# Patient Record
Sex: MALE | Race: WHITE | NOT HISPANIC OR LATINO | Employment: FULL TIME | URBAN - NONMETROPOLITAN AREA
[De-identification: names, ages, dates, MRNs, and addresses within clinical notes are randomized per-mention and may not be internally consistent; named-entity substitution may affect disease eponyms.]

---

## 2017-05-19 ENCOUNTER — OFFICE VISIT (OUTPATIENT)
Dept: OTOLARYNGOLOGY | Facility: CLINIC | Age: 47
End: 2017-05-19

## 2017-05-19 VITALS
SYSTOLIC BLOOD PRESSURE: 124 MMHG | BODY MASS INDEX: 34.58 KG/M2 | WEIGHT: 284 LBS | HEIGHT: 76 IN | RESPIRATION RATE: 18 BRPM | HEART RATE: 73 BPM | TEMPERATURE: 97.3 F | DIASTOLIC BLOOD PRESSURE: 89 MMHG

## 2017-05-19 DIAGNOSIS — J01.40 ACUTE PANSINUSITIS, RECURRENCE NOT SPECIFIED: Primary | ICD-10-CM

## 2017-05-19 DIAGNOSIS — E89.0 POSTOPERATIVE HYPOTHYROIDISM: ICD-10-CM

## 2017-05-19 PROBLEM — E03.9 HYPOTHYROIDISM: Status: ACTIVE | Noted: 2017-05-19

## 2017-05-19 PROBLEM — E04.9 GOITER: Status: RESOLVED | Noted: 2017-05-19 | Resolved: 2017-05-19

## 2017-05-19 PROBLEM — E04.9 GOITER: Status: ACTIVE | Noted: 2017-05-19

## 2017-05-19 PROCEDURE — 99214 OFFICE O/P EST MOD 30 MIN: CPT | Performed by: PHYSICIAN ASSISTANT

## 2017-05-19 RX ORDER — LEVOTHYROXINE SODIUM 0.1 MG/1
TABLET ORAL DAILY
COMMUNITY
End: 2017-06-07 | Stop reason: SDUPTHER

## 2017-05-19 RX ORDER — METHYLPREDNISOLONE 4 MG/1
TABLET ORAL
Qty: 1 EACH | Refills: 0 | Status: SHIPPED | OUTPATIENT
Start: 2017-05-19 | End: 2018-03-10 | Stop reason: HOSPADM

## 2017-05-19 RX ORDER — HYDROCODONE BITARTRATE AND ACETAMINOPHEN 7.5; 325 MG/1; MG/1
TABLET ORAL EVERY 6 HOURS
COMMUNITY

## 2017-05-19 RX ORDER — AMOXICILLIN AND CLAVULANATE POTASSIUM 875; 125 MG/1; MG/1
1 TABLET, FILM COATED ORAL 2 TIMES DAILY
Qty: 20 TABLET | Refills: 0 | Status: SHIPPED | OUTPATIENT
Start: 2017-05-19 | End: 2017-05-29

## 2017-05-19 RX ORDER — GABAPENTIN 300 MG/1
CAPSULE ORAL 3 TIMES DAILY
COMMUNITY
End: 2021-09-13

## 2017-05-19 RX ORDER — PANTOPRAZOLE SODIUM 40 MG/1
GRANULE, DELAYED RELEASE ORAL
COMMUNITY
End: 2021-09-13

## 2017-06-07 DIAGNOSIS — E89.0 POSTOPERATIVE HYPOTHYROIDISM: ICD-10-CM

## 2017-06-07 RX ORDER — LEVOTHYROXINE SODIUM 0.1 MG/1
TABLET ORAL
Qty: 30 TABLET | Refills: 9 | Status: SHIPPED | OUTPATIENT
Start: 2017-06-07 | End: 2018-06-11 | Stop reason: SDUPTHER

## 2017-07-06 DIAGNOSIS — E89.0 POSTOPERATIVE HYPOTHYROIDISM: ICD-10-CM

## 2017-07-06 DIAGNOSIS — E89.0 POSTOPERATIVE HYPOTHYROIDISM: Primary | ICD-10-CM

## 2017-07-06 LAB
ALBUMIN SERPL-MCNC: 4.1 G/DL (ref 3.5–5.2)
ALP BLD-CCNC: 76 U/L (ref 40–130)
ALT SERPL-CCNC: 35 U/L (ref 5–41)
ANION GAP SERPL CALCULATED.3IONS-SCNC: 15 MMOL/L (ref 7–19)
AST SERPL-CCNC: 24 U/L (ref 5–40)
BILIRUB SERPL-MCNC: <0.2 MG/DL (ref 0.2–1.2)
BUN BLDV-MCNC: 22 MG/DL (ref 6–20)
CALCIUM SERPL-MCNC: 9.1 MG/DL (ref 8.6–10)
CHLORIDE BLD-SCNC: 101 MMOL/L (ref 98–111)
CO2: 25 MMOL/L (ref 22–29)
CREAT SERPL-MCNC: 0.9 MG/DL (ref 0.5–1.2)
GFR NON-AFRICAN AMERICAN: >60
GLUCOSE BLD-MCNC: 121 MG/DL (ref 74–109)
HBA1C MFR BLD: 6.8 %
LDL CHOLESTEROL DIRECT: 128 MG/DL
POTASSIUM SERPL-SCNC: 4.4 MMOL/L (ref 3.5–5)
SODIUM BLD-SCNC: 141 MMOL/L (ref 136–145)
TOTAL PROTEIN: 7.2 G/DL (ref 6.6–8.7)
TRIGL SERPL-MCNC: 523 MG/DL (ref 150–199)
TSH SERPL DL<=0.05 MIU/L-ACNC: 3.12 UIU/ML (ref 0.27–4.2)

## 2017-07-12 RX ORDER — TRAMADOL HYDROCHLORIDE 50 MG/1
50 TABLET ORAL EVERY 4 HOURS PRN
COMMUNITY
End: 2017-07-13

## 2017-07-13 ENCOUNTER — OFFICE VISIT (OUTPATIENT)
Dept: INTERNAL MEDICINE | Age: 47
End: 2017-07-13
Payer: COMMERCIAL

## 2017-07-13 VITALS
HEART RATE: 83 BPM | WEIGHT: 289 LBS | HEIGHT: 76 IN | SYSTOLIC BLOOD PRESSURE: 110 MMHG | DIASTOLIC BLOOD PRESSURE: 78 MMHG | BODY MASS INDEX: 35.19 KG/M2 | OXYGEN SATURATION: 98 %

## 2017-07-13 DIAGNOSIS — E78.2 MIXED HYPERLIPIDEMIA: ICD-10-CM

## 2017-07-13 DIAGNOSIS — R73.03 PREDIABETES: ICD-10-CM

## 2017-07-13 DIAGNOSIS — E89.0 POSTOPERATIVE HYPOTHYROIDISM: Primary | ICD-10-CM

## 2017-07-13 PROCEDURE — 99214 OFFICE O/P EST MOD 30 MIN: CPT | Performed by: INTERNAL MEDICINE

## 2017-07-13 ASSESSMENT — ENCOUNTER SYMPTOMS
RHINORRHEA: 0
SORE THROAT: 0
TROUBLE SWALLOWING: 0
COUGH: 0
NAUSEA: 0
ABDOMINAL PAIN: 0
SHORTNESS OF BREATH: 0

## 2017-07-13 ASSESSMENT — PATIENT HEALTH QUESTIONNAIRE - PHQ9
2. FEELING DOWN, DEPRESSED OR HOPELESS: 0
SUM OF ALL RESPONSES TO PHQ QUESTIONS 1-9: 0
1. LITTLE INTEREST OR PLEASURE IN DOING THINGS: 0
SUM OF ALL RESPONSES TO PHQ9 QUESTIONS 1 & 2: 0

## 2018-02-05 ENCOUNTER — TELEPHONE (OUTPATIENT)
Dept: INTERNAL MEDICINE | Age: 48
End: 2018-02-05

## 2018-02-20 ENCOUNTER — OFFICE VISIT (OUTPATIENT)
Dept: INTERNAL MEDICINE | Age: 48
End: 2018-02-20
Payer: COMMERCIAL

## 2018-02-20 VITALS
HEART RATE: 80 BPM | OXYGEN SATURATION: 98 % | DIASTOLIC BLOOD PRESSURE: 60 MMHG | TEMPERATURE: 98 F | SYSTOLIC BLOOD PRESSURE: 104 MMHG

## 2018-02-20 DIAGNOSIS — R50.9 FEVER, UNSPECIFIED FEVER CAUSE: Primary | ICD-10-CM

## 2018-02-20 PROBLEM — J01.40 ACUTE PANSINUSITIS: Status: ACTIVE | Noted: 2017-05-19

## 2018-02-20 LAB
INFLUENZA A ANTIBODY: POSITIVE
INFLUENZA B ANTIBODY: NEGATIVE

## 2018-02-20 PROCEDURE — 99213 OFFICE O/P EST LOW 20 MIN: CPT | Performed by: NURSE PRACTITIONER

## 2018-02-20 PROCEDURE — 87804 INFLUENZA ASSAY W/OPTIC: CPT | Performed by: NURSE PRACTITIONER

## 2018-02-20 RX ORDER — OSELTAMIVIR PHOSPHATE 75 MG/1
75 CAPSULE ORAL 2 TIMES DAILY
Qty: 14 CAPSULE | Refills: 0 | Status: SHIPPED | OUTPATIENT
Start: 2018-02-20 | End: 2018-02-21 | Stop reason: SDUPTHER

## 2018-02-20 ASSESSMENT — ENCOUNTER SYMPTOMS
ABDOMINAL DISTENTION: 0
WHEEZING: 0
TROUBLE SWALLOWING: 0
NAUSEA: 0
STRIDOR: 0
EYE DISCHARGE: 0
COLOR CHANGE: 0
DIARRHEA: 0
CHOKING: 0
BLOOD IN STOOL: 0
SHORTNESS OF BREATH: 0
COUGH: 1
ABDOMINAL PAIN: 0
SORE THROAT: 1
EYE ITCHING: 0
VOMITING: 0
CONSTIPATION: 0

## 2018-02-20 NOTE — PROGRESS NOTES
dizziness, tremors, syncope, speech difficulty, weakness and headaches. Hematological: Negative for adenopathy. Does not bruise/bleed easily. Psychiatric/Behavioral: Negative for confusion and hallucinations. Objective:     Physical Exam   Constitutional: He is oriented to person, place, and time. He appears well-developed and well-nourished. No distress. HENT:   Head: Normocephalic and atraumatic. Eyes: Pupils are equal, round, and reactive to light. Right eye exhibits no discharge. Left eye exhibits no discharge. No scleral icterus. Neck: Normal range of motion. Neck supple. No JVD present. No thyromegaly present. Cardiovascular: Normal rate, regular rhythm and normal heart sounds. No murmur heard. Pulmonary/Chest: Effort normal and breath sounds normal. No respiratory distress. He has no wheezes. He has no rales. Abdominal: Soft. Bowel sounds are normal. He exhibits no distension and no mass. There is no tenderness. There is no rebound and no guarding. Musculoskeletal: Normal range of motion. He exhibits no edema or tenderness. Neurological: He is alert and oriented to person, place, and time. He has normal reflexes. No cranial nerve deficit. Coordination normal.   Skin: Skin is warm and dry. No rash noted. No erythema. Psychiatric: His behavior is normal. Judgment and thought content normal. He does not exhibit a depressed mood. /60   Pulse 80   Temp 98 °F (36.7 °C)   SpO2 98%     Assessment:      No diagnosis found. Plan:        Patient given educational materials - see patient instructions. Discussed use, benefit, and side effects of prescribed medications. All patient questions answered. Pt voiced understanding. Reviewed health maintenance. Instructed to continue current medications, diet and exercise. Patient agreed with treatment plan. Follow up as directed. MEDICATIONS:  No orders of the defined types were placed in this encounter.         ORDERS:  No

## 2018-02-21 RX ORDER — OSELTAMIVIR PHOSPHATE 75 MG/1
75 CAPSULE ORAL 2 TIMES DAILY
Qty: 14 CAPSULE | Refills: 0 | Status: SHIPPED | OUTPATIENT
Start: 2018-02-21 | End: 2018-02-28

## 2018-02-22 ENCOUNTER — TELEPHONE (OUTPATIENT)
Dept: INTERNAL MEDICINE | Age: 48
End: 2018-02-22

## 2018-02-22 NOTE — TELEPHONE ENCOUNTER
Advised to go to the ED, pt states he is loosing his hearing, almost cannot see out of his eyes, eyes are getting mucousy looking, he said his head feels like it is going to pop.  There is so much pressure DISPLAY PLAN FREE TEXT

## 2018-02-23 ENCOUNTER — TELEPHONE (OUTPATIENT)
Dept: INTERNAL MEDICINE | Age: 48
End: 2018-02-23

## 2018-02-23 RX ORDER — CEFDINIR 300 MG/1
300 CAPSULE ORAL 2 TIMES DAILY
Qty: 14 CAPSULE | Refills: 0 | Status: SHIPPED | OUTPATIENT
Start: 2018-02-23 | End: 2018-02-23 | Stop reason: SDUPTHER

## 2018-02-23 RX ORDER — CEFDINIR 300 MG/1
300 CAPSULE ORAL 2 TIMES DAILY
Qty: 14 CAPSULE | Refills: 0 | Status: SHIPPED | OUTPATIENT
Start: 2018-02-23 | End: 2018-03-02 | Stop reason: SDUPTHER

## 2018-03-02 ENCOUNTER — TELEPHONE (OUTPATIENT)
Dept: INTERNAL MEDICINE | Age: 48
End: 2018-03-02

## 2018-03-02 RX ORDER — CEFDINIR 300 MG/1
300 CAPSULE ORAL 2 TIMES DAILY
Qty: 14 CAPSULE | Refills: 0 | Status: SHIPPED | OUTPATIENT
Start: 2018-03-02 | End: 2018-03-09

## 2018-03-02 NOTE — TELEPHONE ENCOUNTER
He can either come over for me to look at it again or we can renew his script, whichever he would like

## 2018-03-09 ENCOUNTER — APPOINTMENT (OUTPATIENT)
Dept: CT IMAGING | Facility: HOSPITAL | Age: 48
End: 2018-03-09

## 2018-03-09 ENCOUNTER — HOSPITAL ENCOUNTER (OUTPATIENT)
Facility: HOSPITAL | Age: 48
Setting detail: OBSERVATION
Discharge: HOME | End: 2018-03-10
Attending: FAMILY MEDICINE | Admitting: FAMILY MEDICINE

## 2018-03-09 ENCOUNTER — APPOINTMENT (OUTPATIENT)
Dept: GENERAL RADIOLOGY | Facility: HOSPITAL | Age: 48
End: 2018-03-09

## 2018-03-09 DIAGNOSIS — K57.32 DIVERTICULITIS OF COLON WITHOUT HEMORRHAGE: Primary | ICD-10-CM

## 2018-03-09 LAB
ALBUMIN SERPL-MCNC: 4.1 G/DL (ref 3.5–5)
ALBUMIN/GLOB SERPL: 1.1 G/DL (ref 1.1–2.5)
ALP SERPL-CCNC: 80 U/L (ref 24–120)
ALT SERPL W P-5'-P-CCNC: 58 U/L (ref 0–54)
ANION GAP SERPL CALCULATED.3IONS-SCNC: 11 MMOL/L (ref 4–13)
AST SERPL-CCNC: 40 U/L (ref 7–45)
BASOPHILS # BLD AUTO: 0.05 10*3/MM3 (ref 0–0.2)
BASOPHILS NFR BLD AUTO: 0.4 % (ref 0–2)
BILIRUB SERPL-MCNC: 0.6 MG/DL (ref 0.1–1)
BILIRUB UR QL STRIP: NEGATIVE
BUN BLD-MCNC: 21 MG/DL (ref 5–21)
BUN/CREAT SERPL: 22.8 (ref 7–25)
CALCIUM SPEC-SCNC: 9.4 MG/DL (ref 8.4–10.4)
CHLORIDE SERPL-SCNC: 100 MMOL/L (ref 98–110)
CLARITY UR: CLEAR
CO2 SERPL-SCNC: 31 MMOL/L (ref 24–31)
COLOR UR: ABNORMAL
CREAT BLD-MCNC: 0.92 MG/DL (ref 0.5–1.4)
D-LACTATE SERPL-SCNC: 1.2 MMOL/L (ref 0.5–2)
DEPRECATED RDW RBC AUTO: 39.8 FL (ref 40–54)
EOSINOPHIL # BLD AUTO: 0.22 10*3/MM3 (ref 0–0.7)
EOSINOPHIL NFR BLD AUTO: 1.9 % (ref 0–4)
ERYTHROCYTE [DISTWIDTH] IN BLOOD BY AUTOMATED COUNT: 13.5 % (ref 12–15)
GFR SERPL CREATININE-BSD FRML MDRD: 88 ML/MIN/1.73
GLOBULIN UR ELPH-MCNC: 3.8 GM/DL
GLUCOSE BLD-MCNC: 102 MG/DL (ref 70–100)
GLUCOSE UR STRIP-MCNC: NEGATIVE MG/DL
HCT VFR BLD AUTO: 45 % (ref 40–52)
HGB BLD-MCNC: 14.6 G/DL (ref 14–18)
HGB UR QL STRIP.AUTO: NEGATIVE
IMM GRANULOCYTES # BLD: 0.1 10*3/MM3 (ref 0–0.03)
IMM GRANULOCYTES NFR BLD: 0.9 % (ref 0–5)
KETONES UR QL STRIP: ABNORMAL
LEUKOCYTE ESTERASE UR QL STRIP.AUTO: NEGATIVE
LYMPHOCYTES # BLD AUTO: 2.98 10*3/MM3 (ref 0.72–4.86)
LYMPHOCYTES NFR BLD AUTO: 25.7 % (ref 15–45)
MCH RBC QN AUTO: 26.6 PG (ref 28–32)
MCHC RBC AUTO-ENTMCNC: 32.4 G/DL (ref 33–36)
MCV RBC AUTO: 82 FL (ref 82–95)
MONOCYTES # BLD AUTO: 0.82 10*3/MM3 (ref 0.19–1.3)
MONOCYTES NFR BLD AUTO: 7.1 % (ref 4–12)
NEUTROPHILS # BLD AUTO: 7.43 10*3/MM3 (ref 1.87–8.4)
NEUTROPHILS NFR BLD AUTO: 64 % (ref 39–78)
NITRITE UR QL STRIP: NEGATIVE
NRBC BLD MANUAL-RTO: 0 /100 WBC (ref 0–0)
PH UR STRIP.AUTO: <=5 [PH] (ref 5–8)
PLATELET # BLD AUTO: 318 10*3/MM3 (ref 130–400)
PMV BLD AUTO: 9.2 FL (ref 6–12)
POTASSIUM BLD-SCNC: 4.4 MMOL/L (ref 3.5–5.3)
PROT SERPL-MCNC: 7.9 G/DL (ref 6.3–8.7)
PROT UR QL STRIP: NEGATIVE
RBC # BLD AUTO: 5.49 10*6/MM3 (ref 4.8–5.9)
SODIUM BLD-SCNC: 142 MMOL/L (ref 135–145)
SP GR UR STRIP: 1.03 (ref 1–1.03)
UROBILINOGEN UR QL STRIP: ABNORMAL
WBC NRBC COR # BLD: 11.6 10*3/MM3 (ref 4.8–10.8)

## 2018-03-09 PROCEDURE — 25010000002 CIPROFLOXACIN PER 200 MG: Performed by: FAMILY MEDICINE

## 2018-03-09 PROCEDURE — 74177 CT ABD & PELVIS W/CONTRAST: CPT

## 2018-03-09 PROCEDURE — 96375 TX/PRO/DX INJ NEW DRUG ADDON: CPT

## 2018-03-09 PROCEDURE — 96376 TX/PRO/DX INJ SAME DRUG ADON: CPT

## 2018-03-09 PROCEDURE — 99284 EMERGENCY DEPT VISIT MOD MDM: CPT

## 2018-03-09 PROCEDURE — 25010000002 HYDROMORPHONE PER 4 MG: Performed by: EMERGENCY MEDICINE

## 2018-03-09 PROCEDURE — 25010000002 ONDANSETRON PER 1 MG: Performed by: PHYSICIAN ASSISTANT

## 2018-03-09 PROCEDURE — G0378 HOSPITAL OBSERVATION PER HR: HCPCS

## 2018-03-09 PROCEDURE — 0 IOPAMIDOL 61 % SOLUTION: Performed by: PHYSICIAN ASSISTANT

## 2018-03-09 PROCEDURE — 81003 URINALYSIS AUTO W/O SCOPE: CPT | Performed by: EMERGENCY MEDICINE

## 2018-03-09 PROCEDURE — 96366 THER/PROPH/DIAG IV INF ADDON: CPT

## 2018-03-09 PROCEDURE — 96361 HYDRATE IV INFUSION ADD-ON: CPT

## 2018-03-09 PROCEDURE — 80053 COMPREHEN METABOLIC PANEL: CPT | Performed by: EMERGENCY MEDICINE

## 2018-03-09 PROCEDURE — 85025 COMPLETE CBC W/AUTO DIFF WBC: CPT | Performed by: EMERGENCY MEDICINE

## 2018-03-09 PROCEDURE — 83605 ASSAY OF LACTIC ACID: CPT | Performed by: EMERGENCY MEDICINE

## 2018-03-09 PROCEDURE — 25010000002 HYDROMORPHONE PER 4 MG: Performed by: FAMILY MEDICINE

## 2018-03-09 PROCEDURE — 74019 RADEX ABDOMEN 2 VIEWS: CPT

## 2018-03-09 PROCEDURE — 96365 THER/PROPH/DIAG IV INF INIT: CPT

## 2018-03-09 PROCEDURE — 25010000002 ONDANSETRON PER 1 MG: Performed by: EMERGENCY MEDICINE

## 2018-03-09 RX ORDER — ACETAMINOPHEN 325 MG/1
650 TABLET ORAL EVERY 4 HOURS PRN
Status: DISCONTINUED | OUTPATIENT
Start: 2018-03-09 | End: 2018-03-10 | Stop reason: HOSPADM

## 2018-03-09 RX ORDER — NALOXONE HCL 0.4 MG/ML
0.4 VIAL (ML) INJECTION
Status: DISCONTINUED | OUTPATIENT
Start: 2018-03-09 | End: 2018-03-09

## 2018-03-09 RX ORDER — GABAPENTIN 300 MG/1
300 CAPSULE ORAL EVERY 8 HOURS SCHEDULED
Status: DISCONTINUED | OUTPATIENT
Start: 2018-03-09 | End: 2018-03-10 | Stop reason: HOSPADM

## 2018-03-09 RX ORDER — SODIUM CHLORIDE 0.9 % (FLUSH) 0.9 %
1-10 SYRINGE (ML) INJECTION AS NEEDED
Status: DISCONTINUED | OUTPATIENT
Start: 2018-03-09 | End: 2018-03-10 | Stop reason: HOSPADM

## 2018-03-09 RX ORDER — PANTOPRAZOLE SODIUM 40 MG/1
40 TABLET, DELAYED RELEASE ORAL
Status: DISCONTINUED | OUTPATIENT
Start: 2018-03-10 | End: 2018-03-10 | Stop reason: HOSPADM

## 2018-03-09 RX ORDER — KETOROLAC TROMETHAMINE 30 MG/ML
30 INJECTION, SOLUTION INTRAMUSCULAR; INTRAVENOUS EVERY 6 HOURS PRN
Status: DISCONTINUED | OUTPATIENT
Start: 2018-03-09 | End: 2018-03-10 | Stop reason: HOSPADM

## 2018-03-09 RX ORDER — ONDANSETRON 2 MG/ML
4 INJECTION INTRAMUSCULAR; INTRAVENOUS ONCE
Status: COMPLETED | OUTPATIENT
Start: 2018-03-09 | End: 2018-03-09

## 2018-03-09 RX ORDER — ONDANSETRON 2 MG/ML
4 INJECTION INTRAMUSCULAR; INTRAVENOUS EVERY 6 HOURS PRN
Status: DISCONTINUED | OUTPATIENT
Start: 2018-03-09 | End: 2018-03-10 | Stop reason: HOSPADM

## 2018-03-09 RX ORDER — LEVOTHYROXINE SODIUM 0.1 MG/1
100 TABLET ORAL
Status: DISCONTINUED | OUTPATIENT
Start: 2018-03-10 | End: 2018-03-10 | Stop reason: HOSPADM

## 2018-03-09 RX ORDER — MORPHINE SULFATE 2 MG/ML
1 INJECTION, SOLUTION INTRAMUSCULAR; INTRAVENOUS EVERY 4 HOURS PRN
Status: DISCONTINUED | OUTPATIENT
Start: 2018-03-09 | End: 2018-03-09

## 2018-03-09 RX ORDER — ALUMINA, MAGNESIA, AND SIMETHICONE 2400; 2400; 240 MG/30ML; MG/30ML; MG/30ML
15 SUSPENSION ORAL EVERY 6 HOURS PRN
Status: DISCONTINUED | OUTPATIENT
Start: 2018-03-09 | End: 2018-03-10 | Stop reason: HOSPADM

## 2018-03-09 RX ORDER — CIPROFLOXACIN 2 MG/ML
400 INJECTION, SOLUTION INTRAVENOUS ONCE
Status: COMPLETED | OUTPATIENT
Start: 2018-03-09 | End: 2018-03-09

## 2018-03-09 RX ORDER — SODIUM CHLORIDE 9 MG/ML
100 INJECTION, SOLUTION INTRAVENOUS CONTINUOUS
Status: DISCONTINUED | OUTPATIENT
Start: 2018-03-09 | End: 2018-03-10 | Stop reason: HOSPADM

## 2018-03-09 RX ORDER — MORPHINE SULFATE 2 MG/ML
2 INJECTION, SOLUTION INTRAMUSCULAR; INTRAVENOUS EVERY 4 HOURS PRN
Status: DISCONTINUED | OUTPATIENT
Start: 2018-03-09 | End: 2018-03-09

## 2018-03-09 RX ORDER — MEPERIDINE HYDROCHLORIDE 25 MG/ML
25 INJECTION INTRAMUSCULAR; INTRAVENOUS; SUBCUTANEOUS ONCE
Status: COMPLETED | OUTPATIENT
Start: 2018-03-09 | End: 2018-03-09

## 2018-03-09 RX ORDER — LEVOFLOXACIN 5 MG/ML
500 INJECTION, SOLUTION INTRAVENOUS EVERY 24 HOURS
Status: DISCONTINUED | OUTPATIENT
Start: 2018-03-10 | End: 2018-03-10 | Stop reason: HOSPADM

## 2018-03-09 RX ORDER — SODIUM CHLORIDE, SODIUM LACTATE, POTASSIUM CHLORIDE, CALCIUM CHLORIDE 600; 310; 30; 20 MG/100ML; MG/100ML; MG/100ML; MG/100ML
125 INJECTION, SOLUTION INTRAVENOUS CONTINUOUS
Status: DISCONTINUED | OUTPATIENT
Start: 2018-03-09 | End: 2018-03-09

## 2018-03-09 RX ADMIN — HYDROMORPHONE HYDROCHLORIDE 1 MG: 1 INJECTION, SOLUTION INTRAMUSCULAR; INTRAVENOUS; SUBCUTANEOUS at 11:20

## 2018-03-09 RX ADMIN — CIPROFLOXACIN 400 MG: 2 INJECTION, SOLUTION INTRAVENOUS at 14:51

## 2018-03-09 RX ADMIN — METRONIDAZOLE 500 MG: 500 INJECTION, SOLUTION INTRAVENOUS at 13:34

## 2018-03-09 RX ADMIN — IOPAMIDOL 100 ML: 612 INJECTION, SOLUTION INTRAVENOUS at 12:08

## 2018-03-09 RX ADMIN — ONDANSETRON 4 MG: 2 INJECTION INTRAMUSCULAR; INTRAVENOUS at 11:20

## 2018-03-09 RX ADMIN — SODIUM CHLORIDE 100 ML/HR: 9 INJECTION, SOLUTION INTRAVENOUS at 18:26

## 2018-03-09 RX ADMIN — METRONIDAZOLE 500 MG: 500 INJECTION, SOLUTION INTRAVENOUS at 22:03

## 2018-03-09 RX ADMIN — HYDROMORPHONE HYDROCHLORIDE 1 MG: 1 INJECTION, SOLUTION INTRAMUSCULAR; INTRAVENOUS; SUBCUTANEOUS at 19:44

## 2018-03-09 RX ADMIN — HYDROMORPHONE HYDROCHLORIDE 1 MG: 1 INJECTION, SOLUTION INTRAMUSCULAR; INTRAVENOUS; SUBCUTANEOUS at 13:30

## 2018-03-09 RX ADMIN — SODIUM CHLORIDE, POTASSIUM CHLORIDE, SODIUM LACTATE AND CALCIUM CHLORIDE 500 ML: 600; 310; 30; 20 INJECTION, SOLUTION INTRAVENOUS at 11:21

## 2018-03-09 RX ADMIN — MEPERIDINE HYDROCHLORIDE 25 MG: 25 INJECTION INTRAMUSCULAR; INTRAVENOUS; SUBCUTANEOUS at 14:59

## 2018-03-09 RX ADMIN — ONDANSETRON 4 MG: 2 INJECTION INTRAMUSCULAR; INTRAVENOUS at 15:07

## 2018-03-09 NOTE — PLAN OF CARE
Problem: Patient Care Overview (Adult)  Goal: Plan of Care Review  Outcome: Ongoing (interventions implemented as appropriate)   03/09/18 1605   Coping/Psychosocial Response Interventions   Plan Of Care Reviewed With patient;spouse   Patient Care Overview   Progress no change   Outcome Evaluation   Outcome Summary/Follow up Plan New admit to [facility] from ER. Diverticulitis, c/o LLQ pain as well as nausea and dry heaving. A&O, up ad devin, voiding. IV abx given in ER. Awaiting orders.      Goal: Adult Individualization and Mutuality  Outcome: Ongoing (interventions implemented as appropriate)    Goal: Discharge Needs Assessment  Outcome: Ongoing (interventions implemented as appropriate)      Problem: Infection, Risk/Actual (Adult)  Goal: Identify Related Risk Factors and Signs and Symptoms  Outcome: Ongoing (interventions implemented as appropriate)   03/09/18 1605   Infection, Risk/Actual   Infection, Risk/Actual: Related Risk Factors exposure to microbes   Signs and Symptoms (Infection, Risk/Actual) nausea;pain;vomiting     Goal: Infection Prevention/Resolution  Outcome: Ongoing (interventions implemented as appropriate)   03/09/18 1605   Infection, Risk/Actual (Adult)   Infection Prevention/Resolution making progress toward outcome

## 2018-03-09 NOTE — H&P
[facility] Medicine Services  HISTORY AND PHYSICAL    Date of Admission: 3/9/2018  Primary Care Physician: Kristian Monique MD    Subjective     Chief Complaint:   Left lower abdominal pain    History of Present Illness    This 47-year-old white male is admitted with a chief complaint of left lower quadrant abdominal discomfort.  Patient states that his pain began yesterday evening around 9 PM and has gradually worsened since that time.  Through the night he had 3-4 episodes of diarrhea.  The patient has had a cholecystectomy so he typically has diarrhea stools after he eats but these episodes were not associated with oral intake.  The patient presented to the emergency department for evaluation.  Copies metabolic panel is unremarkable.  Lactate is normal.  White blood cell count is mildly elevated at 11,600 with no left shift.  Urinalysis is benign.  CT scan of the abdomen and pelvis was obtained with findings suggestive of mild diverticulitis of the distal descending colon with no evidence of perforation or abscess.    Review of Systems   Constitutional: Positive for activity change, appetite change and fatigue.   HENT: Negative.    Eyes: Negative.    Respiratory: Negative.    Cardiovascular: Negative.    Gastrointestinal: Positive for abdominal pain and diarrhea.   Endocrine: Negative.    Genitourinary: Negative.    Musculoskeletal: Negative.    Skin: Negative.    Allergic/Immunologic: Negative.    Neurological: Negative.    Hematological: Negative.    Psychiatric/Behavioral: Negative.         Otherwise complete ROS reviewed and negative except as mentioned in the HPI.      Past Medical History:     Past Medical History:   Diagnosis Date   • Goiter    • Hypothyroidism        Past Surgical History:  Past Surgical History:   Procedure Laterality Date   • CHOLECYSTECTOMY     • HERNIA REPAIR     • KNEE SURGERY     • THYROIDECTOMY Left        Family History:   family history  "includes Cancer in his maternal aunt, maternal uncle, and paternal aunt; Diabetes in his mother; Heart disease in his father.    Social History:    reports that he has never smoked. He has never used smokeless tobacco. He reports that he does not drink alcohol or use illicit drugs.    Medications:  Prior to Admission medications    Medication Sig Start Date End Date Taking? Authorizing Provider   gabapentin (NEURONTIN) 300 MG capsule Take  by mouth 3 (Three) Times a Day.    Historical Provider, MD   HYDROcodone-acetaminophen (NORCO) 7.5-325 MG per tablet Take  by mouth Every 6 (Six) Hours.    Historical Provider, MD   levothyroxine (SYNTHROID, LEVOTHROID) 100 MCG tablet TAKE ONE TABLET EVERY DAY 6/7/17   CURLY Seaman   MethylPREDNISolone (MEDROL) 4 MG tablet Take as directed on package instructions. 5/19/17   CURLY Seaman   pantoprazole (PROTONIX) 40 MG pack packet Take  by mouth Every Morning Before Breakfast.    Historical Provider, MD       Allergies:  No Known Allergies    Objective     Vital Signs:   /61 (BP Location: Left arm, Patient Position: Sitting)  Pulse 67  Temp 98.4 °F (36.9 °C) (Axillary)   Resp 18  Ht 193 cm (76\")  Wt 127 kg (280 lb)  SpO2 98%  BMI 34.08 kg/m2    Physical Exam   Constitutional: He is oriented to person, place, and time. He appears well-developed and well-nourished. He is cooperative.  Non-toxic appearance. No distress.   HENT:   Head: Normocephalic and atraumatic.   Right Ear: External ear normal.   Left Ear: External ear normal.   Nose: Nose normal.   Mouth/Throat: Oropharynx is clear and moist.   Eyes: Conjunctivae and EOM are normal. Pupils are equal, round, and reactive to light. No scleral icterus.   Neck: Normal range of motion. Neck supple. No JVD present. No tracheal deviation present. No thyromegaly (thyroid is nonpalpable) present.   Cardiovascular: Normal rate, regular rhythm, normal heart sounds and intact distal pulses.    No murmur " heard.  Pulmonary/Chest: Effort normal. No respiratory distress. He has no wheezes.   Abdominal: Soft. Bowel sounds are normal. He exhibits no distension. There is tenderness (left lower quadrant). There is guarding (left lower quadrant). There is no rebound. A hernia (ventral) is present.   Musculoskeletal: Normal range of motion. He exhibits edema. He exhibits no tenderness.   Neurological: He is alert and oriented to person, place, and time. He has normal reflexes. No cranial nerve deficit. Coordination normal.   Skin: Skin is warm and dry. No pallor.   Psychiatric: He has a normal mood and affect. His behavior is normal. Thought content normal.     Results Reviewed:   Specimen:  Blood Updated:  03/09/18 1131     WBC 11.60 (H) 10*3/mm3      RBC 5.49 10*6/mm3      Hemoglobin 14.6 g/dL      Hematocrit 45.0 %      MCV 82.0 fL      MCH 26.6 (L) pg      MCHC 32.4 (L) g/dL      RDW 13.5 %      RDW-SD 39.8 (L) fl      MPV 9.2 fL      Platelets 318 10*3/mm3      Neutrophil % 64.0 %      Lymphocyte % 25.7 %      Monocyte % 7.1 %      Eosinophil % 1.9 %      Basophil % 0.4 %      Immature Grans % 0.9 %      Neutrophils, Absolute 7.43 10*3/mm3      Lymphocytes, Absolute 2.98 10*3/mm3      Monocytes, Absolute 0.82 10*3/mm3      Eosinophils, Absolute 0.22 10*3/mm3      Basophils, Absolute 0.05 10*3/mm3      Immature Grans, Absolute 0.10 (H) 10*3/mm3      nRBC 0.0 /100 WBC     Urinalysis With / Culture If Indicated - Urine, Clean Catch [[id number]]  (Abnormal) Collected:  03/09/18 1110    Specimen:  Urine from Urine, Clean Catch Updated:  03/09/18 1132     Color, UA Dark Yellow (A)     Appearance, UA Clear     pH, UA <=5.0     Specific Gravity, UA 1.029     Glucose, UA Negative     Ketones, UA Trace (A)     Bilirubin, UA Negative     Blood, UA Negative     Protein, UA Negative     Leuk Esterase, UA Negative     Nitrite, UA Negative     Urobilinogen, UA 0.2 E.U./dL    Narrative:       Urine microscopic not indicated.    Lactic  Acid, Plasma [[id number]]  (Normal) Collected:  03/09/18 1119    Specimen:  Blood Updated:  03/09/18 1141     Lactate 1.2 mmol/L     Comprehensive Metabolic Panel [[id number]]  (Abnormal) Collected:  03/09/18 1119    Specimen:  Blood Updated:  03/09/18 1143     Glucose 102 (H) mg/dL      BUN 21 mg/dL      Creatinine 0.92 mg/dL      Sodium 142 mmol/L      Potassium 4.4 mmol/L      Chloride 100 mmol/L      CO2 31.0 mmol/L      Calcium 9.4 mg/dL      Total Protein 7.9 g/dL      Albumin 4.10 g/dL      ALT (SGPT) 58 (H) U/L      AST (SGOT) 40 U/L      Alkaline Phosphatase 80 U/L      Total Bilirubin 0.6 mg/dL      eGFR Non African Amer 88 mL/min/1.73      Globulin 3.8 gm/dL      A/G Ratio 1.1 g/dL      BUN/Creatinine Ratio 22.8     Anion Gap 11.0 mmol/L           Xr Abdomen Flat & Upright    Result Date: 3/9/2018  Narrative: History: 47-year-old with severe left-sided abdominal pain.  Reference: Abdominal radiograph October 2007.  FINDINGS: 2 abdominal radiographs performed.  Imaged lung bases are clear. Paucity of bowel gas. No signs of obstruction however. No free air. Right upper quadrant clips presumably related to cholecystectomy. No obvious renal stones. No acute osseous findings.      Impression: No acute findings. This report was finalized on 03/09/2018 11:25 by  Jesus Arriola, .    Ct Abdomen Pelvis With Contrast    Result Date: 3/9/2018  Narrative: EXAMINATION: CT ABDOMEN PELVIS W CONTRAST- 3/9/2018 12:28 PM CST  HISTORY: Lower abdominal pain for 2 days  COMPARISON: CT abdomen with contrast 10/31/2007  DOSE: 803 mGy-cm  TECHNIQUE: Sequential imaging was performed from the lung bases through the pubic symphysis after the administration of IV contrast.  Sagittal and coronal reformations were made from the original source data and reviewed. Automated exposure control was also utilized to decrease patient radiation dose.  FINDINGS: The visualized heart appears normal in size. The lung bases appear clear.  There is diffuse  fatty infiltration of the liver, which otherwise appears normal. The gallbladder is surgically absent. The spleen, pancreas, adrenal glands, and right kidney are unremarkable. A small cyst is seen in the left kidney, which otherwise appears normal. The ureters are decompressed bilaterally.  The main portal and splenic veins and IVC appear grossly normal. The abdominal aorta appears normal in caliber without evidence of aneurysm or dissection. The origins of the celiac axis, superior mesenteric artery, and inferior mesenteric artery enhance normally.  There is no appreciable central mesenteric or retroperitoneal lymphadenopathy.  The stomach and small bowel do not appear overly dilated. A few scattered colonic diverticula are present. There is a focal area of pericolonic enhancement involving the distal descending colon, likely related to acute diverticulitis. There is no evidence of perforation or abscess formation. There is diastases of the rectus abdominis muscles. There has been previous ventral abdominal wall hernia repair. No free air or free fluid is seen in the abdomen.  The urinary bladder is grossly normal in appearance. No pelvic mass or free fluid is identified. No pelvic or inguinal lymphadenopathy is appreciated.  Review of the visualized osseous structures demonstrates no acute or aggressive lesions. Minor degenerative changes are seen in the spine.      Impression: 1. Findings suggest mild diverticulitis of the distal descending colon without evidence of perforation or abscess formation. 2. Hepatic steatosis.    This report was finalized on 03/09/2018 12:34 by Dr. Lenin Rosario MD.     I have personally reviewed and interpreted the radiology studies and ECG obtained at time of admission.     Assessment / Plan      Assessment & Plan  Hospital Problem List     * (Principal)Diverticulitis of colon without hemorrhage    Hypothyroidism          PLAN:   Admit to the medical surgical floor  Levaquin 500 mg  IV every 24 hours  Metronidazole 500 mg IV every 8 hours  Ketorolac 15 mg IV every 6 hours when necessary moderate pain  Dilaudid 1 mg IV every 3 hours when necessary severe pain  Clear liquid diet  Repeat CBC and BMP in a.m.    Code Status:   Full code  Surrogate decision-maker is the patient's wife and mother     I discussed the patients findings and my recommendations with: The patient, his wife and mother    Estimated length of stay: 2-3 days    Richard Choudhury DO   03/09/18   5:13 PM

## 2018-03-09 NOTE — ED PROVIDER NOTES
"Subjective   Patient is a 47 y.o. male presenting with abdominal pain.   Abdominal Pain   Associated symptoms: diarrhea and fatigue    Associated symptoms: no nausea and no vomiting      Patient is a 47-year-old  male chief complaint of severe left sided belly pain and worsening diarrhea.  The patient describes this began yesterday suddenly.  He describes as his \"somebody reached into his gut ripping something apart.\"  He describes as a constant pain.  Nothing makes it better or worse with the most part.  He did roll up a blanket and pushed into his abdomen which provided minimal relief.  The patient denies fever, nausea, or vomiting.  He does complain of decreased appetite.  He had been comfortable throughout the entire night.  The pain worsened by 2:00 in the morning.  He is been unable to sleep.  He denies any radiating discomfort.  He denies any back pain or genitalia pain.    Patient denies history of chronic diarrhea secondary to his cholecystectomy nearly 20 years ago.  this occurs postprandially.  Today, he certainly had 4 episodes before noon.  He had noted a malodorous bowel movements.  He denies any blood in the stool.  He denies any dysuria, hematuria, flank pain.  He has been on antibiotics recently due to the diagnosis of influenza.  She denies any camping or traveling otherwise.    The patient does have a history of \"infection in my gut after I gave him a CPR and aspirated when he was choking on.\"  That was nearly 20 years ago as well.  He completed endoscopy and colonoscopy then which is otherwise otherwise unremarkable.  He denies any other gastro Abnormalities.  He Denies acknowledgement of Diverticulitis.  He Does Have a History of Hernia that was repaired years ago. This Was in the Midline of His Abdominal Wall.  He is not sure if Mesh was Used.    Review of Systems   Constitutional: Positive for activity change, appetite change and fatigue.   HENT: Negative.    Eyes: Negative.  "   Respiratory: Negative.    Cardiovascular: Negative.    Gastrointestinal: Positive for abdominal pain and diarrhea. Negative for blood in stool, nausea, rectal pain and vomiting.   Genitourinary: Negative.  Negative for decreased urine volume, difficulty urinating, penile pain, penile swelling and scrotal swelling.   Musculoskeletal: Negative for back pain.   Skin: Negative.    Neurological: Negative.    Psychiatric/Behavioral: Negative.        Past Medical History:   Diagnosis Date   • Goiter    • Hypothyroidism        No Known Allergies    Past Surgical History:   Procedure Laterality Date   • CHOLECYSTECTOMY     • HERNIA REPAIR     • KNEE SURGERY     • THYROIDECTOMY Left        Family History   Problem Relation Age of Onset   • Diabetes Mother    • Heart disease Father    • Cancer Maternal Aunt    • Cancer Maternal Uncle    • Cancer Paternal Aunt        Social History     Social History   • Marital status:      Social History Main Topics   • Smoking status: Never Smoker   • Alcohol use No   • Drug use: No   • Sexual activity: Defer       Prior to Admission medications    Medication Sig Start Date End Date Taking? Authorizing Provider   gabapentin (NEURONTIN) 300 MG capsule Take  by mouth 3 (Three) Times a Day.    Historical Provider, MD   HYDROcodone-acetaminophen (NORCO) 7.5-325 MG per tablet Take  by mouth Every 6 (Six) Hours.    Historical Provider, MD   levothyroxine (SYNTHROID, LEVOTHROID) 100 MCG tablet TAKE ONE TABLET EVERY DAY 6/7/17   CURLY Seaman   MethylPREDNISolone (MEDROL) 4 MG tablet Take as directed on package instructions. 5/19/17   CURLY Seaman   pantoprazole (PROTONIX) 40 MG pack packet Take  by mouth Every Morning Before Breakfast.    Historical Provider, MD       Medications   lactated ringers infusion (not administered)   ciprofloxacin (CIPRO) IVPB 400 mg (400 mg Intravenous New Bag 3/9/18 8504)   lactated ringers bolus 500 mL (0 mL Intravenous Stopped 3/9/18  "1221)   HYDROmorphone (DILAUDID) injection 1 mg (1 mg Intravenous Given 3/9/18 1120)   ondansetron (ZOFRAN) injection 4 mg (4 mg Intravenous Given 3/9/18 1120)   iopamidol (ISOVUE-300) 61 % injection 100 mL (100 mL Intravenous Given 3/9/18 1208)   metroNIDAZOLE (FLAGYL) IVPB 500 mg (500 mg Intravenous New Bag 3/9/18 1334)   HYDROmorphone (DILAUDID) injection 1 mg (1 mg Intravenous Given 3/9/18 1330)   meperidine (DEMEROL) injection 25 mg (25 mg Intravenous Given 3/9/18 1459)   ondansetron (ZOFRAN) injection 4 mg (4 mg Intravenous Given 3/9/18 1507)       /63 (Patient Position: Sitting)  Pulse 78  Temp 97.4 °F (36.3 °C) (Temporal Artery )   Resp 20  Ht 193 cm (76\")  Wt 127 kg (280 lb)  SpO2 97%  BMI 34.08 kg/m2      Objective   Physical Exam   Constitutional: He is oriented to person, place, and time. He appears well-developed and well-nourished.  Non-toxic appearance. No distress.   HENT:   Head: Normocephalic and atraumatic.   Nose: Nose normal.   Mouth/Throat: Uvula is midline, oropharynx is clear and moist and mucous membranes are normal.   Eyes: Conjunctivae, EOM and lids are normal. Pupils are equal, round, and reactive to light. Lids are everted and swept, no foreign bodies found.   Neck: Trachea normal, normal range of motion, full passive range of motion without pain and phonation normal. Neck supple. Normal carotid pulses and no JVD present. Carotid bruit is not present. No rigidity.   Cardiovascular: Normal rate, regular rhythm, normal heart sounds, intact distal pulses and normal pulses.    Pulmonary/Chest: Effort normal and breath sounds normal. No stridor. No apnea and no tachypnea. No respiratory distress.   Abdominal: Soft. Normal appearance, normal aorta and bowel sounds are normal. He exhibits no distension and no mass. There is no hepatosplenomegaly. There is tenderness in the left lower quadrant. There is rebound and guarding. No hernia. Hernia confirmed negative in the ventral area, " confirmed negative in the right inguinal area and confirmed negative in the left inguinal area.       Genitourinary: Testes normal and penis normal. Circumcised.       Vascular Status -  His exam exhibits right foot vasculature normal. His exam exhibits no right foot edema. His exam exhibits left foot vasculature normal. His exam exhibits no left foot edema.  Neurological: He is alert and oriented to person, place, and time. He has normal strength and normal reflexes. He displays normal reflexes. No cranial nerve deficit or sensory deficit. Gait normal. GCS eye subscore is 4. GCS verbal subscore is 5. GCS motor subscore is 6.   Skin: Skin is warm, dry and intact. He is not diaphoretic. No pallor.   Psychiatric: He has a normal mood and affect. His speech is normal and behavior is normal.   Nursing note and vitals reviewed.      Procedures         Lab Results (last 24 hours)     Procedure Component Value Units Date/Time    Urinalysis With / Culture If Indicated - Urine, Clean Catch [[id number]]  (Abnormal) Collected:  03/09/18 1110    Specimen:  Urine from Urine, Clean Catch Updated:  03/09/18 1132     Color, UA Dark Yellow (A)     Appearance, UA Clear     pH, UA <=5.0     Specific Gravity, UA 1.029     Glucose, UA Negative     Ketones, UA Trace (A)     Bilirubin, UA Negative     Blood, UA Negative     Protein, UA Negative     Leuk Esterase, UA Negative     Nitrite, UA Negative     Urobilinogen, UA 0.2 E.U./dL    Narrative:       Urine microscopic not indicated.    CBC & Differential [[id number]] Collected:  03/09/18 1119    Specimen:  Blood Updated:  03/09/18 1131    Narrative:       The following orders were created for panel order CBC & Differential.  Procedure                               Abnormality         Status                     ---------                               -----------         ------                     CBC Auto Differential[[id number]]         Abnormal            Final result                 Please  view results for these tests on the individual orders.    Comprehensive Metabolic Panel [[id number]]  (Abnormal) Collected:  03/09/18 1119    Specimen:  Blood Updated:  03/09/18 1143     Glucose 102 (H) mg/dL      BUN 21 mg/dL      Creatinine 0.92 mg/dL      Sodium 142 mmol/L      Potassium 4.4 mmol/L      Chloride 100 mmol/L      CO2 31.0 mmol/L      Calcium 9.4 mg/dL      Total Protein 7.9 g/dL      Albumin 4.10 g/dL      ALT (SGPT) 58 (H) U/L      AST (SGOT) 40 U/L      Alkaline Phosphatase 80 U/L      Total Bilirubin 0.6 mg/dL      eGFR Non African Amer 88 mL/min/1.73      Globulin 3.8 gm/dL      A/G Ratio 1.1 g/dL      BUN/Creatinine Ratio 22.8     Anion Gap 11.0 mmol/L     Lactic Acid, Plasma [[id number]]  (Normal) Collected:  03/09/18 1119    Specimen:  Blood Updated:  03/09/18 1141     Lactate 1.2 mmol/L     CBC Auto Differential [[id number]]  (Abnormal) Collected:  03/09/18 1119    Specimen:  Blood Updated:  03/09/18 1131     WBC 11.60 (H) 10*3/mm3      RBC 5.49 10*6/mm3      Hemoglobin 14.6 g/dL      Hematocrit 45.0 %      MCV 82.0 fL      MCH 26.6 (L) pg      MCHC 32.4 (L) g/dL      RDW 13.5 %      RDW-SD 39.8 (L) fl      MPV 9.2 fL      Platelets 318 10*3/mm3      Neutrophil % 64.0 %      Lymphocyte % 25.7 %      Monocyte % 7.1 %      Eosinophil % 1.9 %      Basophil % 0.4 %      Immature Grans % 0.9 %      Neutrophils, Absolute 7.43 10*3/mm3      Lymphocytes, Absolute 2.98 10*3/mm3      Monocytes, Absolute 0.82 10*3/mm3      Eosinophils, Absolute 0.22 10*3/mm3      Basophils, Absolute 0.05 10*3/mm3      Immature Grans, Absolute 0.10 (H) 10*3/mm3      nRBC 0.0 /100 WBC           Xr Abdomen Flat & Upright    Result Date: 3/9/2018  Narrative: History: 47-year-old with severe left-sided abdominal pain.  Reference: Abdominal radiograph October 2007.  FINDINGS: 2 abdominal radiographs performed.  Imaged lung bases are clear. Paucity of bowel gas. No signs of obstruction however. No free air. Right upper quadrant  clips presumably related to cholecystectomy. No obvious renal stones. No acute osseous findings.      Impression: No acute findings. This report was finalized on 03/09/2018 11:25 by  Jesus Arriola .    Ct Abdomen Pelvis With Contrast    Result Date: 3/9/2018  Narrative: EXAMINATION: CT ABDOMEN PELVIS W CONTRAST- 3/9/2018 12:28 PM CST  HISTORY: Lower abdominal pain for 2 days  COMPARISON: CT abdomen with contrast 10/31/2007  DOSE: 803 mGy-cm  TECHNIQUE: Sequential imaging was performed from the lung bases through the pubic symphysis after the administration of IV contrast.  Sagittal and coronal reformations were made from the original source data and reviewed. Automated exposure control was also utilized to decrease patient radiation dose.  FINDINGS: The visualized heart appears normal in size. The lung bases appear clear.  There is diffuse fatty infiltration of the liver, which otherwise appears normal. The gallbladder is surgically absent. The spleen, pancreas, adrenal glands, and right kidney are unremarkable. A small cyst is seen in the left kidney, which otherwise appears normal. The ureters are decompressed bilaterally.  The main portal and splenic veins and IVC appear grossly normal. The abdominal aorta appears normal in caliber without evidence of aneurysm or dissection. The origins of the celiac axis, superior mesenteric artery, and inferior mesenteric artery enhance normally.  There is no appreciable central mesenteric or retroperitoneal lymphadenopathy.  The stomach and small bowel do not appear overly dilated. A few scattered colonic diverticula are present. There is a focal area of pericolonic enhancement involving the distal descending colon, likely related to acute diverticulitis. There is no evidence of perforation or abscess formation. There is diastases of the rectus abdominis muscles. There has been previous ventral abdominal wall hernia repair. No free air or free fluid is seen in the abdomen.  The  urinary bladder is grossly normal in appearance. No pelvic mass or free fluid is identified. No pelvic or inguinal lymphadenopathy is appreciated.  Review of the visualized osseous structures demonstrates no acute or aggressive lesions. Minor degenerative changes are seen in the spine.      Impression: 1. Findings suggest mild diverticulitis of the distal descending colon without evidence of perforation or abscess formation. 2. Hepatic steatosis.    This report was finalized on 03/09/2018 12:34 by Dr. Lenin Rosario MD.      ED Course  ED Course        Patient has continued pain and vomiting while here despite numerous attempts.  I have reviewed this with Dr. White who will admit the patient under their services.     MDM    Final diagnoses:   Diverticulitis of colon without hemorrhage          CURLY Mabry  03/09/18 [id number]

## 2018-03-10 VITALS
HEIGHT: 76 IN | HEART RATE: 72 BPM | DIASTOLIC BLOOD PRESSURE: 65 MMHG | BODY MASS INDEX: 34.1 KG/M2 | OXYGEN SATURATION: 95 % | TEMPERATURE: 98.5 F | RESPIRATION RATE: 16 BRPM | WEIGHT: 280 LBS | SYSTOLIC BLOOD PRESSURE: 120 MMHG

## 2018-03-10 LAB
ANION GAP SERPL CALCULATED.3IONS-SCNC: 8 MMOL/L (ref 4–13)
BASOPHILS # BLD AUTO: 0.02 10*3/MM3 (ref 0–0.2)
BASOPHILS NFR BLD AUTO: 0.2 % (ref 0–2)
BUN BLD-MCNC: 17 MG/DL (ref 5–21)
BUN/CREAT SERPL: 23 (ref 7–25)
CALCIUM SPEC-SCNC: 8.3 MG/DL (ref 8.4–10.4)
CHLORIDE SERPL-SCNC: 101 MMOL/L (ref 98–110)
CO2 SERPL-SCNC: 31 MMOL/L (ref 24–31)
CREAT BLD-MCNC: 0.74 MG/DL (ref 0.5–1.4)
DEPRECATED RDW RBC AUTO: 40.4 FL (ref 40–54)
EOSINOPHIL # BLD AUTO: 0.19 10*3/MM3 (ref 0–0.7)
EOSINOPHIL NFR BLD AUTO: 2 % (ref 0–4)
ERYTHROCYTE [DISTWIDTH] IN BLOOD BY AUTOMATED COUNT: 13.6 % (ref 12–15)
GFR SERPL CREATININE-BSD FRML MDRD: 113 ML/MIN/1.73
GLUCOSE BLD-MCNC: 106 MG/DL (ref 70–100)
HCT VFR BLD AUTO: 37.7 % (ref 40–52)
HGB BLD-MCNC: 12.4 G/DL (ref 14–18)
IMM GRANULOCYTES # BLD: 0.07 10*3/MM3 (ref 0–0.03)
IMM GRANULOCYTES NFR BLD: 0.7 % (ref 0–5)
LYMPHOCYTES # BLD AUTO: 2.46 10*3/MM3 (ref 0.72–4.86)
LYMPHOCYTES NFR BLD AUTO: 26.2 % (ref 15–45)
MCH RBC QN AUTO: 27 PG (ref 28–32)
MCHC RBC AUTO-ENTMCNC: 32.9 G/DL (ref 33–36)
MCV RBC AUTO: 82 FL (ref 82–95)
MONOCYTES # BLD AUTO: 0.58 10*3/MM3 (ref 0.19–1.3)
MONOCYTES NFR BLD AUTO: 6.2 % (ref 4–12)
NEUTROPHILS # BLD AUTO: 6.06 10*3/MM3 (ref 1.87–8.4)
NEUTROPHILS NFR BLD AUTO: 64.7 % (ref 39–78)
NRBC BLD MANUAL-RTO: 0 /100 WBC (ref 0–0)
PLATELET # BLD AUTO: 234 10*3/MM3 (ref 130–400)
PMV BLD AUTO: 9.3 FL (ref 6–12)
POTASSIUM BLD-SCNC: 4 MMOL/L (ref 3.5–5.3)
RBC # BLD AUTO: 4.6 10*6/MM3 (ref 4.8–5.9)
SODIUM BLD-SCNC: 140 MMOL/L (ref 135–145)
WBC NRBC COR # BLD: 9.38 10*3/MM3 (ref 4.8–10.8)

## 2018-03-10 PROCEDURE — 85025 COMPLETE CBC W/AUTO DIFF WBC: CPT | Performed by: FAMILY MEDICINE

## 2018-03-10 PROCEDURE — 25010000002 LEVOFLOXACIN PER 250 MG: Performed by: FAMILY MEDICINE

## 2018-03-10 PROCEDURE — G0378 HOSPITAL OBSERVATION PER HR: HCPCS

## 2018-03-10 PROCEDURE — 96376 TX/PRO/DX INJ SAME DRUG ADON: CPT

## 2018-03-10 PROCEDURE — 96366 THER/PROPH/DIAG IV INF ADDON: CPT

## 2018-03-10 PROCEDURE — 96361 HYDRATE IV INFUSION ADD-ON: CPT

## 2018-03-10 PROCEDURE — 25010000002 KETOROLAC TROMETHAMINE PER 15 MG: Performed by: FAMILY MEDICINE

## 2018-03-10 PROCEDURE — 96375 TX/PRO/DX INJ NEW DRUG ADDON: CPT

## 2018-03-10 PROCEDURE — 80048 BASIC METABOLIC PNL TOTAL CA: CPT | Performed by: FAMILY MEDICINE

## 2018-03-10 RX ORDER — METRONIDAZOLE 250 MG/1
250 TABLET ORAL 3 TIMES DAILY
Qty: 21 TABLET | Refills: 0 | Status: SHIPPED | OUTPATIENT
Start: 2018-03-10 | End: 2018-03-10

## 2018-03-10 RX ORDER — METRONIDAZOLE 250 MG/1
250 TABLET ORAL 3 TIMES DAILY
Qty: 21 TABLET | Refills: 0 | Status: SHIPPED | OUTPATIENT
Start: 2018-03-10 | End: 2020-12-01

## 2018-03-10 RX ORDER — OXYCODONE HYDROCHLORIDE AND ACETAMINOPHEN 5; 325 MG/1; MG/1
1 TABLET ORAL EVERY 4 HOURS PRN
Qty: 15 TABLET | Refills: 0
Start: 2018-03-10 | End: 2021-09-13 | Stop reason: ALTCHOICE

## 2018-03-10 RX ORDER — LEVOFLOXACIN 500 MG/1
500 TABLET, FILM COATED ORAL DAILY
Qty: 7 TABLET | Refills: 0 | Status: SHIPPED | OUTPATIENT
Start: 2018-03-10 | End: 2020-12-01

## 2018-03-10 RX ORDER — LEVOFLOXACIN 500 MG/1
500 TABLET, FILM COATED ORAL DAILY
Qty: 7 TABLET | Refills: 0 | Status: SHIPPED | OUTPATIENT
Start: 2018-03-10 | End: 2018-03-10

## 2018-03-10 RX ADMIN — KETOROLAC TROMETHAMINE 30 MG: 30 INJECTION, SOLUTION INTRAMUSCULAR at 03:59

## 2018-03-10 RX ADMIN — SODIUM CHLORIDE 100 ML/HR: 9 INJECTION, SOLUTION INTRAVENOUS at 03:59

## 2018-03-10 RX ADMIN — LEVOFLOXACIN 500 MG: 5 INJECTION, SOLUTION INTRAVENOUS at 15:09

## 2018-03-10 RX ADMIN — METRONIDAZOLE 500 MG: 500 INJECTION, SOLUTION INTRAVENOUS at 14:09

## 2018-03-10 RX ADMIN — KETOROLAC TROMETHAMINE 30 MG: 30 INJECTION, SOLUTION INTRAMUSCULAR at 11:18

## 2018-03-10 RX ADMIN — METRONIDAZOLE 500 MG: 500 INJECTION, SOLUTION INTRAVENOUS at 05:48

## 2018-03-10 RX ADMIN — LEVOTHYROXINE SODIUM 100 MCG: 100 TABLET ORAL at 05:48

## 2018-03-10 NOTE — DISCHARGE SUMMARY
[facility] Medicine Services  DISCHARGE SUMMARY       Date of Admission: 3/9/2018  Date of Discharge:  3/10/2018  Primary Care Physician: Kristian Monique MD    Discharge Diagnoses:  Patient Active Problem List   Diagnosis   • Hypothyroidism   • Acute pansinusitis   • Diverticulitis of colon without hemorrhage         Presenting Problem/History of Present Illness:  Diverticulitis of colon without hemorrhage [K57.32]     Chief Complaint on Day of Discharge:   Left lower quadrant abdominal discomfort    History of Present Illness on Day of Discharge:   The patient is feeling somewhat better.  He has been afebrile since admission and his abdominal pain has decreased.  White blood cell count is within normal limits.      Hospital Course  This 47-year-old white male is admitted with a chief complaint of left lower quadrant abdominal discomfort.  Patient states that his pain began yesterday evening around 9 PM and has gradually worsened since that time.  Through the night he had 3-4 episodes of diarrhea.  The patient has had a cholecystectomy so he typically has diarrhea stools after he eats but these episodes were not associated with oral intake.  The patient presented to the emergency department for evaluation.  Copies metabolic panel is unremarkable.  Lactate is normal.  White blood cell count is mildly elevated at 11,600 with no left shift.  Urinalysis is benign.  CT scan of the abdomen and pelvis was obtained with findings suggestive of mild diverticulitis of the distal descending colon with no evidence of perforation or abscess.  PLAN:   Admit to the medical surgical floor  Levaquin 500 mg IV every 24 hours  Metronidazole 500 mg IV every 8 hours  Ketorolac 15 mg IV every 6 hours when necessary moderate pain  Dilaudid 1 mg IV every 3 hours when necessary severe pain  Clear liquid diet  Repeat CBC and BMP in a.m.    The patient has been afebrile since admission and his abdominal  pain has decreased.  White blood cell count is within normal limits.  He has been able to tolerate a clear liquid diet and should be able to gradually advance his diet to tolerance.  Given that his diverticulitis is mild he should be appropriate for further outpatient treatment the patient will be discharged home with oral antibiotics as noted above.  He is to follow-up with his family physician next week and should not return to work until he has been cleared by his family physician.      Pertinent Test Results:   Lab Results (last 7 days)     Procedure Component Value Units Date/Time    Basic Metabolic Panel [[id number]]  (Abnormal) Collected:  03/10/18 0628    Specimen:  Blood Updated:  03/10/18 0659     Glucose 106 (H) mg/dL      BUN 17 mg/dL      Creatinine 0.74 mg/dL      Sodium 140 mmol/L      Potassium 4.0 mmol/L      Chloride 101 mmol/L      CO2 31.0 mmol/L      Calcium 8.3 (L) mg/dL      eGFR Non African Amer 113 mL/min/1.73      BUN/Creatinine Ratio 23.0     Anion Gap 8.0 mmol/L     CBC Auto Differential [[id number]]  (Abnormal) Collected:  03/10/18 0628    Specimen:  Blood Updated:  03/10/18 0647     WBC 9.38 10*3/mm3      RBC 4.60 (L) 10*6/mm3      Hemoglobin 12.4 (L) g/dL      Hematocrit 37.7 (L) %      MCV 82.0 fL      MCH 27.0 (L) pg      MCHC 32.9 (L) g/dL      RDW 13.6 %      RDW-SD 40.4 fl      MPV 9.3 fL      Platelets 234 10*3/mm3      Neutrophil % 64.7 %      Lymphocyte % 26.2 %      Monocyte % 6.2 %      Eosinophil % 2.0 %      Basophil % 0.2 %      Immature Grans % 0.7 %      Neutrophils, Absolute 6.06 10*3/mm3      Lymphocytes, Absolute 2.46 10*3/mm3      Monocytes, Absolute 0.58 10*3/mm3      Eosinophils, Absolute 0.19 10*3/mm3      Basophils, Absolute 0.02 10*3/mm3      Immature Grans, Absolute 0.07 (H) 10*3/mm3      nRBC 0.0 /100 WBC     Comprehensive Metabolic Panel [[id number]]  (Abnormal) Collected:  03/09/18 1119    Specimen:  Blood Updated:  03/09/18 1143     Glucose 102 (H) mg/dL       BUN 21 mg/dL      Creatinine 0.92 mg/dL      Sodium 142 mmol/L      Potassium 4.4 mmol/L      Chloride 100 mmol/L      CO2 31.0 mmol/L      Calcium 9.4 mg/dL      Total Protein 7.9 g/dL      Albumin 4.10 g/dL      ALT (SGPT) 58 (H) U/L      AST (SGOT) 40 U/L      Alkaline Phosphatase 80 U/L      Total Bilirubin 0.6 mg/dL      eGFR Non African Amer 88 mL/min/1.73      Globulin 3.8 gm/dL      A/G Ratio 1.1 g/dL      BUN/Creatinine Ratio 22.8     Anion Gap 11.0 mmol/L     Lactic Acid, Plasma [[id number]]  (Normal) Collected:  03/09/18 1119    Specimen:  Blood Updated:  03/09/18 1141     Lactate 1.2 mmol/L     Urinalysis With / Culture If Indicated - Urine, Clean Catch [[id number]]  (Abnormal) Collected:  03/09/18 1110    Specimen:  Urine from Urine, Clean Catch Updated:  03/09/18 1132     Color, UA Dark Yellow (A)     Appearance, UA Clear     pH, UA <=5.0     Specific Gravity, UA 1.029     Glucose, UA Negative     Ketones, UA Trace (A)     Bilirubin, UA Negative     Blood, UA Negative     Protein, UA Negative     Leuk Esterase, UA Negative     Nitrite, UA Negative     Urobilinogen, UA 0.2 E.U./dL    CBC Auto Differential [[id number]]  (Abnormal) Collected:  03/09/18 1119    Specimen:  Blood Updated:  03/09/18 1131     WBC 11.60 (H) 10*3/mm3      RBC 5.49 10*6/mm3      Hemoglobin 14.6 g/dL      Hematocrit 45.0 %      MCV 82.0 fL      MCH 26.6 (L) pg      MCHC 32.4 (L) g/dL      RDW 13.5 %      RDW-SD 39.8 (L) fl      MPV 9.2 fL      Platelets 318 10*3/mm3      Neutrophil % 64.0 %      Lymphocyte % 25.7 %      Monocyte % 7.1 %      Eosinophil % 1.9 %      Basophil % 0.4 %      Immature Grans % 0.9 %      Neutrophils, Absolute 7.43 10*3/mm3      Lymphocytes, Absolute 2.98 10*3/mm3      Monocytes, Absolute 0.82 10*3/mm3      Eosinophils, Absolute 0.22 10*3/mm3      Basophils, Absolute 0.05 10*3/mm3      Immature Grans, Absolute 0.10 (H) 10*3/mm3      nRBC 0.0 /100 WBC         Imaging Results (last 7 days)     Procedure Component  Value Units Date/Time    CT Abdomen Pelvis With Contrast [[id number]] Collected:  03/09/18 1228     Updated:  03/09/18 1237    Narrative:       EXAMINATION: CT ABDOMEN PELVIS W CONTRAST- 3/9/2018 12:28 PM CST     HISTORY: Lower abdominal pain for 2 days     COMPARISON: CT abdomen with contrast 10/31/2007     DOSE: 803 mGy-cm     TECHNIQUE: Sequential imaging was performed from the lung bases through  the pubic symphysis after the administration of IV contrast.  Sagittal  and coronal reformations were made from the original source data and  reviewed. Automated exposure control was also utilized to decrease  patient radiation dose.     FINDINGS:   The visualized heart appears normal in size. The lung bases appear  clear.     There is diffuse fatty infiltration of the liver, which otherwise  appears normal. The gallbladder is surgically absent. The spleen,  pancreas, adrenal glands, and right kidney are unremarkable. A small  cyst is seen in the left kidney, which otherwise appears normal. The  ureters are decompressed bilaterally.     The main portal and splenic veins and IVC appear grossly normal. The  abdominal aorta appears normal in caliber without evidence of aneurysm  or dissection. The origins of the celiac axis, superior mesenteric  artery, and inferior mesenteric artery enhance normally.     There is no appreciable central mesenteric or retroperitoneal  lymphadenopathy.     The stomach and small bowel do not appear overly dilated. A few  scattered colonic diverticula are present. There is a focal area of  pericolonic enhancement involving the distal descending colon, likely  related to acute diverticulitis. There is no evidence of perforation or  abscess formation. There is diastases of the rectus abdominis muscles.  There has been previous ventral abdominal wall hernia repair. No free  air or free fluid is seen in the abdomen.     The urinary bladder is grossly normal in appearance. No pelvic mass or  free  "fluid is identified. No pelvic or inguinal lymphadenopathy is  appreciated.     Review of the visualized osseous structures demonstrates no acute or  aggressive lesions. Minor degenerative changes are seen in the spine.       Impression:       1. Findings suggest mild diverticulitis of the distal descending colon  without evidence of perforation or abscess formation.  2. Hepatic steatosis.           This report was finalized on 03/09/2018 12:34 by Dr. Lenin Rosario MD.    XR Abdomen Flat & Upright [[id number]] Collected:  03/09/18 1124     Updated:  03/09/18 1128    Narrative:       History:  47-year-old with severe left-sided abdominal pain.     Reference:  Abdominal radiograph October 2007.     FINDINGS:  2 abdominal radiographs performed.     Imaged lung bases are clear. Paucity of bowel gas. No signs of  obstruction however. No free air. Right upper quadrant clips presumably  related to cholecystectomy. No obvious renal stones. No acute osseous  findings.       Impression:       No acute findings.  This report was finalized on 03/09/2018 11:25 by  Jesus Arriola, .            Condition on Discharge:    Stable and improved    Physical Exam on Discharge:  /65 (BP Location: Right arm, Patient Position: Lying)   Pulse 72   Temp 98.5 °F (36.9 °C) (Oral)   Resp 16   Ht 193 cm (76\")   Wt 127 kg (280 lb)   SpO2 95%   BMI 34.08 kg/m²   Physical Exam  Constitutional: He is oriented to person, place, and time. He appears well-developed and well-nourished. He is cooperative.  Non-toxic appearance. No distress.   HENT:   Head: Normocephalic and atraumatic.   Right Ear: External ear normal.   Left Ear: External ear normal.   Nose: Nose normal.   Mouth/Throat: Oropharynx is clear and moist.   Eyes: Conjunctivae and EOM are normal. Pupils are equal, round, and reactive to light. No scleral icterus.   Neck: Normal range of motion. Neck supple. No JVD present. No tracheal deviation present. No thyromegaly (thyroid is " nonpalpable) present.   Cardiovascular: Normal rate, regular rhythm, normal heart sounds and intact distal pulses.    No murmur heard.  Pulmonary/Chest: Effort normal. No respiratory distress. He has no wheezes.   Abdominal: Soft. Bowel sounds are normal. He exhibits no distension. There is tenderness (left lower quadrant). There is no guarding. There is no rebound. A hernia (ventral) is present.   Musculoskeletal: Normal range of motion. He exhibits no tenderness.   Neurological: He is alert and oriented to person, place, and time. He has normal reflexes. No cranial nerve deficit. Coordination normal.   Skin: Skin is warm and dry. No pallor.   Psychiatric: He has a normal mood and affect. His behavior is normal. Thought content normal.     Discharge Disposition:  Home or Self Care    Discharge Medications:   Rolando Andrade   Home Medication Instructions RACHEL:[id number]    Printed on:03/10/18 3639   Medication Information                      gabapentin (NEURONTIN) 300 MG capsule  Take  by mouth 3 (Three) Times a Day.             HYDROcodone-acetaminophen (NORCO) 7.5-325 MG per tablet  Take  by mouth Every 6 (Six) Hours.             levoFLOXacin (LEVAQUIN) 500 MG tablet  Take 1 tablet by mouth Daily.             levothyroxine (SYNTHROID, LEVOTHROID) 100 MCG tablet  TAKE ONE TABLET EVERY DAY             metroNIDAZOLE (FLAGYL) 250 MG tablet  Take 1 tablet by mouth 3 (Three) Times a Day.             oxyCODONE-acetaminophen (PERCOCET) 5-325 MG per tablet  Take 1 tablet by mouth Every 4 (Four) Hours As Needed for Moderate Pain .             pantoprazole (PROTONIX) 40 MG pack packet  Take  by mouth Every Morning Before Breakfast.                 Discharge Diet:   Diet Instructions     Advance Diet As Tolerated             Discharge Care Plan / Instructions:   Discharge home    Activity at Discharge:   Activity Instructions     Activity as Tolerated       Discharge Activity Restrictions       Return to school / work  in 5 days.          Follow-up Appointments:  Follow-up with primary care physician next week       Richard Choudhury DO  03/10/18  2:29 PM    Time: Discharge Less than 30 min    Please note that portions of this note may have been completed with a voice recognition program. Efforts were made to edit the dictations, but occasionally words are mistranscribed.

## 2018-03-10 NOTE — PLAN OF CARE
Problem: Patient Care Overview (Adult)  Goal: Plan of Care Review  Outcome: Ongoing (interventions implemented as appropriate)   03/09/18 1605 03/09/18 1944 03/10/18 0450   Coping/Psychosocial Response Interventions   Plan Of Care Reviewed With --  patient;mother --    Patient Care Overview   Progress no change --  --    Outcome Evaluation   Outcome Summary/Follow up Plan --  --  medicated x1 with IV pain meds, no c/o nausea at this time, voiding, IV abx given as ordered, will cont to monitor.      Goal: Adult Individualization and Mutuality  Outcome: Ongoing (interventions implemented as appropriate)   03/10/18 0450   Individualization   Patient Specific Preferences keep door closed and room cool   Patient Specific Goals decrease pain    Patient Specific Interventions pain, IV abx, VS   Mutuality/Individual Preferences   What Anxieties, Fears or Concerns Do You Have About Your Health or Care? none at this time   What Questions Do You Have About Your Health or Care? none at this time   What Information Would Help Us Give You More Personalized Care? 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

## 2018-03-15 ENCOUNTER — OFFICE VISIT (OUTPATIENT)
Dept: INTERNAL MEDICINE | Age: 48
End: 2018-03-15
Payer: COMMERCIAL

## 2018-03-15 VITALS
OXYGEN SATURATION: 97 % | WEIGHT: 283 LBS | HEART RATE: 101 BPM | SYSTOLIC BLOOD PRESSURE: 104 MMHG | HEIGHT: 76 IN | DIASTOLIC BLOOD PRESSURE: 70 MMHG | BODY MASS INDEX: 34.46 KG/M2

## 2018-03-15 DIAGNOSIS — E89.0 POSTOPERATIVE HYPOTHYROIDISM: Primary | ICD-10-CM

## 2018-03-15 DIAGNOSIS — K57.32 DIVERTICULITIS OF LARGE INTESTINE WITHOUT PERFORATION OR ABSCESS WITHOUT BLEEDING: ICD-10-CM

## 2018-03-15 PROCEDURE — 1111F DSCHRG MED/CURRENT MED MERGE: CPT | Performed by: INTERNAL MEDICINE

## 2018-03-15 PROCEDURE — 99214 OFFICE O/P EST MOD 30 MIN: CPT | Performed by: INTERNAL MEDICINE

## 2018-03-15 ASSESSMENT — ENCOUNTER SYMPTOMS
ABDOMINAL PAIN: 0
NAUSEA: 0
RHINORRHEA: 0
SORE THROAT: 0
COUGH: 0
TROUBLE SWALLOWING: 0
SHORTNESS OF BREATH: 0

## 2018-03-15 NOTE — PATIENT INSTRUCTIONS
fiber supplement, such as Citrucel or Metamucil, every day if needed. Read and follow all instructions on the label. · Schedule time each day for a bowel movement. Having a daily routine may help. Take your time and do not strain when having a bowel movement. Some people avoid nuts, seeds, berries, and popcorn. They believe that these foods might get trapped in the diverticula and cause pain. But there is no proof that these foods cause diverticulitis or make it worse. How are these problems treated? · The best way to treat diverticulosis is to avoid constipation. (See the tips above.)  · Treatment for diverticulitis includes antibiotics and often a change in your diet. You may need only liquids at first. Your doctor may suggest pain medicines for pain or belly cramps. In some cases, surgery may be needed. Follow-up care is a key part of your treatment and safety. Be sure to make and go to all appointments, and call your doctor if you are having problems. It's also a good idea to know your test results and keep a list of the medicines you take. Where can you learn more? Go to https://Treedom.eMar. org and sign in to your [employer] account. Enter A164 in the Vitasoft box to learn more about \"Learning About Diverticulosis and Diverticulitis. \"     If you do not have an account, please click on the \"Sign Up Now\" link. Current as of: May 12, 2017  Content Version: 11.5  © 5591-7885 [employer]wise, [employer]. Care instructions adapted under license by [facility] ([facility]). If you have questions about a medical condition or this instruction, always ask your healthcare professional. [place] [address] any warranty or liability for your use of this information.

## 2018-06-11 DIAGNOSIS — E89.0 POSTOPERATIVE HYPOTHYROIDISM: ICD-10-CM

## 2018-06-11 RX ORDER — LEVOTHYROXINE SODIUM 0.1 MG/1
TABLET ORAL
Qty: 30 TABLET | Refills: 9 | Status: SHIPPED | OUTPATIENT
Start: 2018-06-11

## 2018-08-20 DIAGNOSIS — E03.9 ACQUIRED HYPOTHYROIDISM: Primary | ICD-10-CM

## 2018-08-20 DIAGNOSIS — E78.2 MIXED HYPERLIPIDEMIA: ICD-10-CM

## 2018-08-20 DIAGNOSIS — E89.0 POSTOPERATIVE HYPOTHYROIDISM: ICD-10-CM

## 2018-08-20 DIAGNOSIS — R73.03 PREDIABETES: ICD-10-CM

## 2018-08-20 LAB
ALBUMIN SERPL-MCNC: 4.1 G/DL (ref 3.5–5.2)
ALP BLD-CCNC: 81 U/L (ref 40–130)
ALT SERPL-CCNC: 38 U/L (ref 5–41)
ANION GAP SERPL CALCULATED.3IONS-SCNC: 14 MMOL/L (ref 7–19)
AST SERPL-CCNC: 21 U/L (ref 5–40)
BILIRUB SERPL-MCNC: 0.3 MG/DL (ref 0.2–1.2)
BUN BLDV-MCNC: 19 MG/DL (ref 6–20)
CALCIUM SERPL-MCNC: 8.9 MG/DL (ref 8.6–10)
CHLORIDE BLD-SCNC: 101 MMOL/L (ref 98–111)
CHOLESTEROL, TOTAL: 176 MG/DL (ref 160–199)
CO2: 26 MMOL/L (ref 22–29)
CREAT SERPL-MCNC: 0.8 MG/DL (ref 0.5–1.2)
CREATININE URINE: 103.1 MG/DL (ref 4.2–622)
GFR NON-AFRICAN AMERICAN: >60
GLUCOSE BLD-MCNC: 137 MG/DL (ref 74–109)
HBA1C MFR BLD: 7 % (ref 4–6)
HCT VFR BLD CALC: 42.3 % (ref 42–52)
HDLC SERPL-MCNC: 33 MG/DL (ref 55–121)
HEMOGLOBIN: 13.5 G/DL (ref 14–18)
LDL CHOLESTEROL CALCULATED: 96 MG/DL
MCH RBC QN AUTO: 26.7 PG (ref 27–31)
MCHC RBC AUTO-ENTMCNC: 31.9 G/DL (ref 33–37)
MCV RBC AUTO: 83.8 FL (ref 80–94)
MICROALBUMIN UR-MCNC: <1.2 MG/DL (ref 0–19)
MICROALBUMIN/CREAT UR-RTO: NORMAL MG/G
PDW BLD-RTO: 13.8 % (ref 11.5–14.5)
PLATELET # BLD: 220 K/UL (ref 130–400)
PMV BLD AUTO: 9.4 FL (ref 9.4–12.4)
POTASSIUM SERPL-SCNC: 3.9 MMOL/L (ref 3.5–5)
RBC # BLD: 5.05 M/UL (ref 4.7–6.1)
SODIUM BLD-SCNC: 141 MMOL/L (ref 136–145)
T4 FREE: 1 NG/DL (ref 0.9–1.7)
TOTAL PROTEIN: 6.8 G/DL (ref 6.6–8.7)
TRIGL SERPL-MCNC: 237 MG/DL (ref 0–149)
TSH SERPL DL<=0.05 MIU/L-ACNC: 1.43 UIU/ML (ref 0.27–4.2)
WBC # BLD: 7.6 K/UL (ref 4.8–10.8)

## 2018-08-23 ENCOUNTER — OFFICE VISIT (OUTPATIENT)
Dept: INTERNAL MEDICINE | Age: 48
End: 2018-08-23
Payer: COMMERCIAL

## 2018-08-23 VITALS
OXYGEN SATURATION: 98 % | HEART RATE: 75 BPM | WEIGHT: 288 LBS | DIASTOLIC BLOOD PRESSURE: 84 MMHG | SYSTOLIC BLOOD PRESSURE: 122 MMHG | HEIGHT: 76 IN | BODY MASS INDEX: 35.07 KG/M2

## 2018-08-23 DIAGNOSIS — E89.0 POSTOPERATIVE HYPOTHYROIDISM: ICD-10-CM

## 2018-08-23 DIAGNOSIS — M19.90 ARTHRITIS: ICD-10-CM

## 2018-08-23 DIAGNOSIS — R53.82 CHRONIC FATIGUE: ICD-10-CM

## 2018-08-23 DIAGNOSIS — G47.33 OBSTRUCTIVE SLEEP APNEA SYNDROME: ICD-10-CM

## 2018-08-23 DIAGNOSIS — R73.03 PREDIABETES: Primary | ICD-10-CM

## 2018-08-23 DIAGNOSIS — E78.2 MIXED HYPERLIPIDEMIA: ICD-10-CM

## 2018-08-23 LAB — TESTOSTERONE TOTAL: 352.4 NG/DL (ref 249–836)

## 2018-08-23 PROCEDURE — 99215 OFFICE O/P EST HI 40 MIN: CPT | Performed by: INTERNAL MEDICINE

## 2018-08-23 ASSESSMENT — PATIENT HEALTH QUESTIONNAIRE - PHQ9
2. FEELING DOWN, DEPRESSED OR HOPELESS: 0
SUM OF ALL RESPONSES TO PHQ9 QUESTIONS 1 & 2: 0
1. LITTLE INTEREST OR PLEASURE IN DOING THINGS: 0
SUM OF ALL RESPONSES TO PHQ QUESTIONS 1-9: 0
SUM OF ALL RESPONSES TO PHQ QUESTIONS 1-9: 0

## 2018-08-23 ASSESSMENT — ENCOUNTER SYMPTOMS
ABDOMINAL DISTENTION: 0
EYE DISCHARGE: 0
VOMITING: 0
BLOOD IN STOOL: 0
SINUS PRESSURE: 0
BACK PAIN: 0
SHORTNESS OF BREATH: 0
TROUBLE SWALLOWING: 0
WHEEZING: 0
NAUSEA: 0
APNEA: 1
RECTAL PAIN: 0
DIARRHEA: 1
EYE ITCHING: 0
SORE THROAT: 0
ABDOMINAL PAIN: 1

## 2018-08-23 NOTE — PROGRESS NOTES
[facility]  [address]  [address] [place] [address]  Dept: [phone]  Dept Fax: [phone]: [phone]      Visit Date: 8/23/2018    Pricilla Richards is a 50 y.o. male who presents today for:  Chief Complaint   Patient presents with    Established New Doctor     TRANSFER FROM [facility]         HPI:     Terence Stephenson is a 49-year-old gentleman who is transferring her from our service from Dr. Loy salazar. He's here for his physical. He has had multiple medical problems and we spent a big part of the time today reviewing his chart. He is prediabetic and has been told to watch his carbs in the past and Dr. Kaylee Lazo spent drawing A1c's on him. He also has hypothyroidism after he had a goiter removed surgically in the past and is on thyroid replacement. He has a history of increased lipids. He's been trying to watch his diet and his works as a  so he works nights and sleeps during the day and doesn't have a whole lot of time for exercise. He also has a  and has 37goat's he tends each  day. Chronically fatigued and this is been going on for a while and he doesn't always from his thyroid or from his sleep apnea. He was diagnosed with sleep apnea about 10 years ago when he cannot wear the face mask because it winds up giving him head colds and sinus congestion and he's never been able to tolerate it. Sounds hard to tell whether some of the fatigue is due to the apnea or due to other causes. He's never had a testosterone level drawn. He states his sex drive is down his erections are not as good as they used to be so we need to check a testosterone level as well.     Past Medical History:   Diagnosis Date    Acid reflux     Adenomatous goiter     2013 left thyroidectomy  Dr Velazquez Session   [employer] Arthralgia of multiple sites     Arthritis     osteo    Chronic back pain     Effusion of patella     Gastric ulcer     Knee pain     Medial deformity. Lymphadenopathy:     He has no cervical adenopathy. Neurological: He is alert and oriented to person, place, and time. He has normal reflexes. No cranial nerve deficit. He exhibits normal muscle tone. Coordination normal.   Skin: Skin is warm and dry. No rash noted. He is not diaphoretic. No erythema. No pallor. Psychiatric: He has a normal mood and affect. His behavior is normal. Judgment and thought content normal.        Assessment:      Diagnosis Orders   1. Prediabetes  Comprehensive Metabolic Panel    Hemoglobin A1C   2. Postoperative hypothyroidism  TSH without Reflex    T4, Free   3. Mixed hyperlipidemia     4. Arthritis     5. Chronic fatigue  Testosterone   6. Obstructive sleep apnea syndrome              Plan:     pre diabetes. His blood sugars 137 and last year he was about 120. His hemoglobin A1c last year was 6.8 and is up to 7.0 today. He's got diabetes in his family. He really doesn't watch the carbs and I told him to make sure he watches that is because he has chronic creeping up and may get to the point where he needs to be on diabetes medicines. Talked about diet and carb intake and what he needs to do. Hypothyroid. He is on Synthroid replacement and he's at goal with his levels. He is not having a problems tolerating his Synthroid. Regarding continue the same dose. His numbers are euthyroid on exam today. Hyperlipidemia. His lipids last year were 500 and this ureter down to 2:30. He started cutting out some of the beef and dairy in his diet. He's been eating more fish and chicken. He also needs to continue to try to exercise more. Osteoarthritis of both knees. He's had cartilage removal. He's got left knee pain and he is using over-the-counter ibuprofen for that and he doesn't want to change that at this point. Fatigue this is getting much worse and he's noticing that he has night sweats as well as increased difficulties with erections.  He is also noticing that he has less interest in doing things that he used to 6 drops down. He's never had a testosterone level drawn and were going to send him back to the lab today for testosterone. Chronic sleep apnea. This may also be contributing to his fatigue as described above. However he is not able to tolerate the CPAP mask. Overall he appears to be doing okay with the exception of his fatigue and his shoes with his triglycerides. He is doing better with his triglycerides and we've talked today about his diet. I spent  40 minutes with him today and 50% of that time is been talking to him about the testosterone as amended side effects. We've also talked about his prediabetic condition and what he needs to do in order to get it under control. We've also talked about his lipids and thought he needs to do dietary wise for that. He really needs to increase his exercise and he can find it. We talked about 45 minutes 3 times a week cardiovascular exercise. See him back in 6 months and we will recheck a thyroid on him at that time and if his testosterone is only borderline low we will repeat that band. I told him if his testosterone level comes back low we will send him a prescription for testosterone replacement. Return in about 6 months (around 2/23/2019) for diabetes check, thyroid check, LAB 1 WEEK BEFORE APPOINTMENT. Patient given educational materials - see patient instructions. Discussed use, benefit, and side effects of prescribed medications. All patient questions answered. Pt voiced understanding. Reviewed health maintenance. Instructed to continue current medications, diet and exercise. Patient agreed with treatment plan. **This report has been created using voice recognition software. It may contain minor errors which are inherent in voice recognition technology. **    Electronically signed by Lenora Latif MD on 8/23/2018 at 10:00 AM

## 2018-10-19 ENCOUNTER — TELEPHONE (OUTPATIENT)
Dept: INTERNAL MEDICINE | Age: 48
End: 2018-10-19

## 2019-03-01 ENCOUNTER — OFFICE VISIT (OUTPATIENT)
Dept: INTERNAL MEDICINE | Age: 49
End: 2019-03-01
Payer: COMMERCIAL

## 2019-03-01 VITALS
BODY MASS INDEX: 34.34 KG/M2 | HEIGHT: 76 IN | WEIGHT: 282 LBS | HEART RATE: 76 BPM | SYSTOLIC BLOOD PRESSURE: 104 MMHG | DIASTOLIC BLOOD PRESSURE: 60 MMHG | OXYGEN SATURATION: 98 %

## 2019-03-01 DIAGNOSIS — E78.2 MIXED HYPERLIPIDEMIA: ICD-10-CM

## 2019-03-01 DIAGNOSIS — R73.03 PREDIABETES: ICD-10-CM

## 2019-03-01 DIAGNOSIS — E03.9 ACQUIRED HYPOTHYROIDISM: Primary | ICD-10-CM

## 2019-03-01 PROCEDURE — 99214 OFFICE O/P EST MOD 30 MIN: CPT | Performed by: INTERNAL MEDICINE

## 2019-03-01 ASSESSMENT — ENCOUNTER SYMPTOMS
VOMITING: 0
SINUS PRESSURE: 0
EYE ITCHING: 0
ABDOMINAL PAIN: 0
BACK PAIN: 0
TROUBLE SWALLOWING: 0
SHORTNESS OF BREATH: 0
WHEEZING: 0
NAUSEA: 0
ABDOMINAL DISTENTION: 0
EYE DISCHARGE: 0
BLOOD IN STOOL: 0
SORE THROAT: 0

## 2019-03-01 ASSESSMENT — PATIENT HEALTH QUESTIONNAIRE - PHQ9
2. FEELING DOWN, DEPRESSED OR HOPELESS: 0
1. LITTLE INTEREST OR PLEASURE IN DOING THINGS: 0
SUM OF ALL RESPONSES TO PHQ QUESTIONS 1-9: 0
SUM OF ALL RESPONSES TO PHQ QUESTIONS 1-9: 0
SUM OF ALL RESPONSES TO PHQ9 QUESTIONS 1 & 2: 0

## 2019-07-19 DIAGNOSIS — E89.0 POSTOPERATIVE HYPOTHYROIDISM: ICD-10-CM

## 2019-07-19 RX ORDER — LEVOTHYROXINE SODIUM 0.1 MG/1
TABLET ORAL
Qty: 30 TABLET | Refills: 9 | OUTPATIENT
Start: 2019-07-19

## 2019-07-22 ENCOUNTER — OFFICE VISIT (OUTPATIENT)
Dept: INTERNAL MEDICINE | Age: 49
End: 2019-07-22
Payer: COMMERCIAL

## 2019-07-22 VITALS — DIASTOLIC BLOOD PRESSURE: 88 MMHG | SYSTOLIC BLOOD PRESSURE: 138 MMHG

## 2019-07-22 DIAGNOSIS — B35.9 RINGWORM: ICD-10-CM

## 2019-07-22 DIAGNOSIS — L03.116 CELLULITIS OF LEFT LOWER EXTREMITY: ICD-10-CM

## 2019-07-22 PROCEDURE — 99213 OFFICE O/P EST LOW 20 MIN: CPT | Performed by: INTERNAL MEDICINE

## 2019-07-22 RX ORDER — LEVOTHYROXINE SODIUM 0.1 MG/1
100 TABLET ORAL DAILY
Qty: 30 TABLET | Refills: 5 | Status: SHIPPED | OUTPATIENT
Start: 2019-07-22 | End: 2019-09-06 | Stop reason: SDUPTHER

## 2019-07-22 RX ORDER — CLOTRIMAZOLE AND BETAMETHASONE DIPROPIONATE 10; .64 MG/G; MG/G
CREAM TOPICAL
Qty: 1 TUBE | Refills: 1 | Status: SHIPPED | OUTPATIENT
Start: 2019-07-22 | End: 2019-09-06 | Stop reason: SDUPTHER

## 2019-07-22 RX ORDER — FLUCONAZOLE 200 MG/1
200 TABLET ORAL DAILY
Qty: 7 TABLET | Refills: 0 | Status: SHIPPED | OUTPATIENT
Start: 2019-07-22 | End: 2019-11-19 | Stop reason: SDUPTHER

## 2019-07-22 RX ORDER — DOXYCYCLINE HYCLATE 100 MG
100 TABLET ORAL 2 TIMES DAILY
Qty: 20 TABLET | Refills: 0 | Status: SHIPPED | OUTPATIENT
Start: 2019-07-22 | End: 2019-11-19 | Stop reason: SDUPTHER

## 2019-08-30 DIAGNOSIS — E78.2 MIXED HYPERLIPIDEMIA: ICD-10-CM

## 2019-08-30 DIAGNOSIS — R73.03 PREDIABETES: ICD-10-CM

## 2019-08-30 DIAGNOSIS — E03.9 ACQUIRED HYPOTHYROIDISM: ICD-10-CM

## 2019-08-30 LAB
ALBUMIN SERPL-MCNC: 4.2 G/DL (ref 3.5–5.2)
ALP BLD-CCNC: 88 U/L (ref 40–130)
ALT SERPL-CCNC: 32 U/L (ref 5–41)
ANION GAP SERPL CALCULATED.3IONS-SCNC: 14 MMOL/L (ref 7–19)
AST SERPL-CCNC: 19 U/L (ref 5–40)
BASOPHILS ABSOLUTE: 0.1 K/UL (ref 0–0.2)
BASOPHILS RELATIVE PERCENT: 0.6 % (ref 0–1)
BILIRUB SERPL-MCNC: 0.4 MG/DL (ref 0.2–1.2)
BUN BLDV-MCNC: 19 MG/DL (ref 6–20)
CALCIUM SERPL-MCNC: 8.9 MG/DL (ref 8.6–10)
CHLORIDE BLD-SCNC: 101 MMOL/L (ref 98–111)
CHOLESTEROL, TOTAL: 163 MG/DL (ref 160–199)
CO2: 26 MMOL/L (ref 22–29)
CREAT SERPL-MCNC: 0.8 MG/DL (ref 0.5–1.2)
EOSINOPHILS ABSOLUTE: 0.2 K/UL (ref 0–0.6)
EOSINOPHILS RELATIVE PERCENT: 2.1 % (ref 0–5)
GFR NON-AFRICAN AMERICAN: >60
GLUCOSE BLD-MCNC: 152 MG/DL (ref 74–109)
HBA1C MFR BLD: 7.1 % (ref 4–6)
HCT VFR BLD CALC: 47.3 % (ref 42–52)
HDLC SERPL-MCNC: 36 MG/DL (ref 55–121)
HEMOGLOBIN: 15 G/DL (ref 14–18)
IMMATURE GRANULOCYTES #: 0 K/UL
LDL CHOLESTEROL CALCULATED: 99 MG/DL
LYMPHOCYTES ABSOLUTE: 2.2 K/UL (ref 1.1–4.5)
LYMPHOCYTES RELATIVE PERCENT: 26.5 % (ref 20–40)
MCH RBC QN AUTO: 27.3 PG (ref 27–31)
MCHC RBC AUTO-ENTMCNC: 31.7 G/DL (ref 33–37)
MCV RBC AUTO: 86.2 FL (ref 80–94)
MONOCYTES ABSOLUTE: 0.5 K/UL (ref 0–0.9)
MONOCYTES RELATIVE PERCENT: 6.1 % (ref 0–10)
NEUTROPHILS ABSOLUTE: 5.2 K/UL (ref 1.5–7.5)
NEUTROPHILS RELATIVE PERCENT: 64.2 % (ref 50–65)
PDW BLD-RTO: 13.3 % (ref 11.5–14.5)
PLATELET # BLD: 231 K/UL (ref 130–400)
PMV BLD AUTO: 9.4 FL (ref 9.4–12.4)
POTASSIUM SERPL-SCNC: 4.5 MMOL/L (ref 3.5–5)
RBC # BLD: 5.49 M/UL (ref 4.7–6.1)
SODIUM BLD-SCNC: 141 MMOL/L (ref 136–145)
T4 FREE: 1 NG/DL (ref 0.9–1.7)
TOTAL PROTEIN: 7.4 G/DL (ref 6.6–8.7)
TRIGL SERPL-MCNC: 142 MG/DL (ref 0–149)
TSH SERPL DL<=0.05 MIU/L-ACNC: 1.85 UIU/ML (ref 0.27–4.2)
WBC # BLD: 8.2 K/UL (ref 4.8–10.8)

## 2019-09-06 ENCOUNTER — OFFICE VISIT (OUTPATIENT)
Dept: INTERNAL MEDICINE | Age: 49
End: 2019-09-06
Payer: COMMERCIAL

## 2019-09-06 VITALS
WEIGHT: 285 LBS | SYSTOLIC BLOOD PRESSURE: 92 MMHG | BODY MASS INDEX: 34.7 KG/M2 | HEIGHT: 76 IN | HEART RATE: 80 BPM | DIASTOLIC BLOOD PRESSURE: 58 MMHG | OXYGEN SATURATION: 98 %

## 2019-09-06 DIAGNOSIS — R73.09 ELEVATED GLUCOSE: ICD-10-CM

## 2019-09-06 DIAGNOSIS — R53.82 CHRONIC FATIGUE: ICD-10-CM

## 2019-09-06 DIAGNOSIS — E78.2 MIXED HYPERLIPIDEMIA: ICD-10-CM

## 2019-09-06 DIAGNOSIS — E03.9 ACQUIRED HYPOTHYROIDISM: ICD-10-CM

## 2019-09-06 DIAGNOSIS — E03.9 ACQUIRED HYPOTHYROIDISM: Primary | ICD-10-CM

## 2019-09-06 DIAGNOSIS — M19.90 ARTHRITIS: ICD-10-CM

## 2019-09-06 LAB — TESTOSTERONE TOTAL: 333.6 NG/DL (ref 249–836)

## 2019-09-06 PROCEDURE — 99396 PREV VISIT EST AGE 40-64: CPT | Performed by: INTERNAL MEDICINE

## 2019-09-06 RX ORDER — GLYBURIDE 5 MG/1
5 TABLET ORAL
Qty: 30 TABLET | Refills: 3 | Status: SHIPPED | OUTPATIENT
Start: 2019-09-06 | End: 2020-01-13

## 2019-09-06 RX ORDER — CLOTRIMAZOLE AND BETAMETHASONE DIPROPIONATE 10; .64 MG/G; MG/G
CREAM TOPICAL
Qty: 1 TUBE | Refills: 1 | Status: SHIPPED | OUTPATIENT
Start: 2019-09-06 | End: 2019-09-26 | Stop reason: CLARIF

## 2019-09-06 RX ORDER — CLOTRIMAZOLE AND BETAMETHASONE DIPROPIONATE 10; .64 MG/G; MG/G
CREAM TOPICAL
Qty: 1 TUBE | Refills: 1 | Status: SHIPPED | OUTPATIENT
Start: 2019-09-06 | End: 2020-11-10 | Stop reason: CLARIF

## 2019-09-06 RX ORDER — LEVOTHYROXINE SODIUM 0.1 MG/1
100 TABLET ORAL DAILY
Qty: 30 TABLET | Refills: 5 | Status: SHIPPED | OUTPATIENT
Start: 2019-09-06 | End: 2020-09-28 | Stop reason: SDUPTHER

## 2019-09-06 ASSESSMENT — ENCOUNTER SYMPTOMS
SINUS PRESSURE: 0
ABDOMINAL DISTENTION: 0
NAUSEA: 0
ABDOMINAL PAIN: 0
SORE THROAT: 0
BLOOD IN STOOL: 0
VOMITING: 0
SHORTNESS OF BREATH: 0
COLOR CHANGE: 1
EYE ITCHING: 0
WHEEZING: 0
TROUBLE SWALLOWING: 0
BACK PAIN: 0
EYE DISCHARGE: 0

## 2019-09-26 ENCOUNTER — OFFICE VISIT (OUTPATIENT)
Dept: INTERNAL MEDICINE | Age: 49
End: 2019-09-26
Payer: COMMERCIAL

## 2019-09-26 VITALS
WEIGHT: 289 LBS | OXYGEN SATURATION: 98 % | BODY MASS INDEX: 35.18 KG/M2 | HEART RATE: 72 BPM | SYSTOLIC BLOOD PRESSURE: 120 MMHG | DIASTOLIC BLOOD PRESSURE: 80 MMHG

## 2019-09-26 DIAGNOSIS — G43.019 INTRACTABLE MIGRAINE WITHOUT AURA AND WITHOUT STATUS MIGRAINOSUS: ICD-10-CM

## 2019-09-26 PROCEDURE — 99213 OFFICE O/P EST LOW 20 MIN: CPT | Performed by: INTERNAL MEDICINE

## 2019-09-26 RX ORDER — RIZATRIPTAN BENZOATE 10 MG/1
10 TABLET, ORALLY DISINTEGRATING ORAL
Qty: 30 TABLET | Refills: 3 | Status: SHIPPED | OUTPATIENT
Start: 2019-09-26 | End: 2020-10-12 | Stop reason: SDUPTHER

## 2019-09-26 ASSESSMENT — ENCOUNTER SYMPTOMS
TROUBLE SWALLOWING: 0
BLOOD IN STOOL: 0
SINUS PRESSURE: 0
VOMITING: 0
SORE THROAT: 0
EYE ITCHING: 0
SHORTNESS OF BREATH: 0
EYE DISCHARGE: 0
ABDOMINAL PAIN: 0
BACK PAIN: 0
WHEEZING: 0
NAUSEA: 0
ABDOMINAL DISTENTION: 0

## 2019-09-26 NOTE — PROGRESS NOTES
oriented to person, place, and time. He has normal reflexes. He displays normal reflexes. No cranial nerve deficit. He exhibits normal muscle tone. Coordination normal.   Skin: Skin is warm and dry. No rash noted. He is not diaphoretic. No erythema. No pallor. Psychiatric: He has a normal mood and affect. His behavior is normal. Judgment and thought content normal.        Assessment:      Diagnosis Orders   1. Intractable migraine without aura and without status migrainosus  MRI BRAIN WO CONTRAST            Plan:     Intractable migraine headache this been going on for months with no evidence of any work-up in the past or treatment. I am going to get an MRI of his head to make sure there is not something from a physiological standpoint contributing to his headaches. I am also going to start him on Maxalt-MLT for his headaches. He is to keep his scheduled appointment. No follow-ups on file. Patient given educational materials- see patient instructions. Discussed use, benefit, and side effects of prescribedmedications. All patient questions answered. Pt voiced understanding. Reviewedhealth maintenance. Instructed to continue current medications, diet and exercise. Patient agreed with treatment plan. **This report has been created usingvoice recognition software. It may contain minor errors which are inherent in voicerecognition technology. **    Electronically signed by Darrin Koehler MD on 9/26/2019 at 9:31 AM

## 2019-10-04 DIAGNOSIS — G43.019 INTRACTABLE MIGRAINE WITHOUT AURA AND WITHOUT STATUS MIGRAINOSUS: ICD-10-CM

## 2019-10-08 ENCOUNTER — OFFICE VISIT (OUTPATIENT)
Dept: INTERNAL MEDICINE | Age: 49
End: 2019-10-08
Payer: COMMERCIAL

## 2019-10-08 VITALS
HEIGHT: 76 IN | DIASTOLIC BLOOD PRESSURE: 75 MMHG | SYSTOLIC BLOOD PRESSURE: 120 MMHG | OXYGEN SATURATION: 97 % | BODY MASS INDEX: 34.7 KG/M2 | WEIGHT: 285 LBS | HEART RATE: 66 BPM

## 2019-10-08 DIAGNOSIS — G43.019 INTRACTABLE MIGRAINE WITHOUT AURA AND WITHOUT STATUS MIGRAINOSUS: Primary | ICD-10-CM

## 2019-10-08 PROCEDURE — 99212 OFFICE O/P EST SF 10 MIN: CPT | Performed by: NURSE PRACTITIONER

## 2019-10-08 RX ORDER — LOSARTAN POTASSIUM 25 MG/1
25 TABLET ORAL DAILY
Qty: 30 TABLET | Refills: 5 | Status: SHIPPED | OUTPATIENT
Start: 2019-10-08 | End: 2020-09-28 | Stop reason: SDUPTHER

## 2019-10-08 RX ORDER — CHLORPROMAZINE HYDROCHLORIDE 50 MG/1
50 TABLET, FILM COATED ORAL NIGHTLY
Qty: 30 TABLET | Refills: 0 | Status: SHIPPED | OUTPATIENT
Start: 2019-10-08 | End: 2020-11-10 | Stop reason: CLARIF

## 2019-10-08 RX ORDER — AMITRIPTYLINE HYDROCHLORIDE 25 MG/1
25 TABLET, FILM COATED ORAL NIGHTLY
Qty: 30 TABLET | Refills: 0 | Status: SHIPPED | OUTPATIENT
Start: 2019-10-08 | End: 2019-11-19 | Stop reason: SDUPTHER

## 2019-10-08 RX ORDER — PROMETHAZINE HYDROCHLORIDE 25 MG/1
25 TABLET ORAL 3 TIMES DAILY PRN
Qty: 12 TABLET | Refills: 0 | Status: SHIPPED | OUTPATIENT
Start: 2019-10-08 | End: 2019-10-15

## 2019-10-08 RX ORDER — METHYLPREDNISOLONE 4 MG/1
TABLET ORAL
Qty: 1 KIT | Refills: 0 | Status: SHIPPED | OUTPATIENT
Start: 2019-10-08 | End: 2019-10-14

## 2019-10-08 ASSESSMENT — ENCOUNTER SYMPTOMS
CONSTIPATION: 0
TROUBLE SWALLOWING: 0
STRIDOR: 0
COUGH: 0
VOMITING: 0
ABDOMINAL DISTENTION: 0
EYE ITCHING: 0
SHORTNESS OF BREATH: 0
BLOOD IN STOOL: 0
NAUSEA: 0
SORE THROAT: 0
EYE DISCHARGE: 0
WHEEZING: 0
DIARRHEA: 0
COLOR CHANGE: 0
ABDOMINAL PAIN: 0
CHOKING: 0

## 2019-11-19 RX ORDER — FLUCONAZOLE 200 MG/1
200 TABLET ORAL DAILY
Qty: 7 TABLET | Refills: 0 | Status: SHIPPED | OUTPATIENT
Start: 2019-11-19 | End: 2019-11-26

## 2019-11-19 RX ORDER — DOXYCYCLINE HYCLATE 100 MG
100 TABLET ORAL 2 TIMES DAILY
Qty: 20 TABLET | Refills: 0 | Status: SHIPPED | OUTPATIENT
Start: 2019-11-19 | End: 2019-11-29

## 2019-11-19 RX ORDER — AMITRIPTYLINE HYDROCHLORIDE 25 MG/1
25 TABLET, FILM COATED ORAL NIGHTLY
Qty: 30 TABLET | Refills: 2 | Status: SHIPPED | OUTPATIENT
Start: 2019-11-19 | End: 2022-05-11

## 2019-12-02 ENCOUNTER — OFFICE VISIT (OUTPATIENT)
Dept: NEUROLOGY | Age: 49
End: 2019-12-02
Payer: COMMERCIAL

## 2019-12-02 VITALS
HEIGHT: 76 IN | SYSTOLIC BLOOD PRESSURE: 109 MMHG | HEART RATE: 93 BPM | DIASTOLIC BLOOD PRESSURE: 73 MMHG | WEIGHT: 289 LBS | BODY MASS INDEX: 35.19 KG/M2

## 2019-12-02 DIAGNOSIS — Z87.898 HISTORY OF INSOMNIA: ICD-10-CM

## 2019-12-02 DIAGNOSIS — Z86.39 HISTORY OF DIABETES MELLITUS: ICD-10-CM

## 2019-12-02 DIAGNOSIS — G43.719 INTRACTABLE CHRONIC MIGRAINE WITHOUT AURA AND WITHOUT STATUS MIGRAINOSUS: Primary | ICD-10-CM

## 2019-12-02 PROCEDURE — 99203 OFFICE O/P NEW LOW 30 MIN: CPT | Performed by: PSYCHIATRY & NEUROLOGY

## 2020-01-03 DIAGNOSIS — R73.09 ELEVATED GLUCOSE: ICD-10-CM

## 2020-01-03 DIAGNOSIS — E03.9 ACQUIRED HYPOTHYROIDISM: ICD-10-CM

## 2020-01-03 DIAGNOSIS — M19.90 ARTHRITIS: ICD-10-CM

## 2020-01-03 DIAGNOSIS — R53.82 CHRONIC FATIGUE: ICD-10-CM

## 2020-01-03 DIAGNOSIS — E78.2 MIXED HYPERLIPIDEMIA: ICD-10-CM

## 2020-01-03 LAB
ALBUMIN SERPL-MCNC: 3.9 G/DL (ref 3.5–5.2)
ALP BLD-CCNC: 73 U/L (ref 40–130)
ALT SERPL-CCNC: 42 U/L (ref 5–41)
ANION GAP SERPL CALCULATED.3IONS-SCNC: 14 MMOL/L (ref 7–19)
AST SERPL-CCNC: 20 U/L (ref 5–40)
BILIRUB SERPL-MCNC: 0.4 MG/DL (ref 0.2–1.2)
BUN BLDV-MCNC: 20 MG/DL (ref 6–20)
CALCIUM SERPL-MCNC: 8.5 MG/DL (ref 8.6–10)
CHLORIDE BLD-SCNC: 100 MMOL/L (ref 98–111)
CO2: 23 MMOL/L (ref 22–29)
CREAT SERPL-MCNC: 0.8 MG/DL (ref 0.5–1.2)
GFR NON-AFRICAN AMERICAN: >60
GLUCOSE BLD-MCNC: 134 MG/DL (ref 74–109)
HBA1C MFR BLD: 6.7 % (ref 4–6)
POTASSIUM SERPL-SCNC: 4.3 MMOL/L (ref 3.5–5)
SODIUM BLD-SCNC: 137 MMOL/L (ref 136–145)
T4 FREE: 1.07 NG/DL (ref 0.93–1.7)
TOTAL PROTEIN: 6.9 G/DL (ref 6.6–8.7)
TSH SERPL DL<=0.05 MIU/L-ACNC: 1.56 UIU/ML (ref 0.27–4.2)

## 2020-01-06 ENCOUNTER — TELEPHONE (OUTPATIENT)
Dept: NEUROLOGY | Age: 50
End: 2020-01-06

## 2020-01-10 ENCOUNTER — OFFICE VISIT (OUTPATIENT)
Dept: INTERNAL MEDICINE | Age: 50
End: 2020-01-10
Payer: COMMERCIAL

## 2020-01-10 VITALS
SYSTOLIC BLOOD PRESSURE: 102 MMHG | OXYGEN SATURATION: 97 % | HEIGHT: 76 IN | DIASTOLIC BLOOD PRESSURE: 60 MMHG | HEART RATE: 88 BPM | WEIGHT: 295 LBS | BODY MASS INDEX: 35.92 KG/M2

## 2020-01-10 PROBLEM — L03.116 CELLULITIS OF LEFT LOWER EXTREMITY: Status: RESOLVED | Noted: 2019-07-22 | Resolved: 2020-01-10

## 2020-01-10 PROCEDURE — 99214 OFFICE O/P EST MOD 30 MIN: CPT | Performed by: INTERNAL MEDICINE

## 2020-01-10 ASSESSMENT — PATIENT HEALTH QUESTIONNAIRE - PHQ9
2. FEELING DOWN, DEPRESSED OR HOPELESS: 0
SUM OF ALL RESPONSES TO PHQ QUESTIONS 1-9: 0
1. LITTLE INTEREST OR PLEASURE IN DOING THINGS: 0
SUM OF ALL RESPONSES TO PHQ9 QUESTIONS 1 & 2: 0
SUM OF ALL RESPONSES TO PHQ QUESTIONS 1-9: 0

## 2020-01-10 ASSESSMENT — ENCOUNTER SYMPTOMS
EYE ITCHING: 0
ABDOMINAL DISTENTION: 0
ABDOMINAL PAIN: 0
VOMITING: 0
EYE DISCHARGE: 0
BACK PAIN: 0
TROUBLE SWALLOWING: 0
SINUS PRESSURE: 0
WHEEZING: 0
SHORTNESS OF BREATH: 0
NAUSEA: 0
SORE THROAT: 0
BLOOD IN STOOL: 0

## 2020-01-10 NOTE — PROGRESS NOTES
[facility] INTERNAL MEDICINE  [address] [id number] [id number] [id number]  Dept: [phone]  Dept Fax: [phone]: [phone]      Visit Date: 1/10/2020    Hemalatha Livingston a 52 y.o. male who presents today for:  Chief Complaint   Patient presents with    Hypothyroidism    Other     yeast infection on rt ankle will not go away with treatment         HPI:     Sharee Zeng is in for follow-up 4-month visit on his blood pressure and thyroid. He is doing okay. His thyroid looks like it is at goal and his chronic fatigue appears to be about the same. Since we saw him he has been to see neurology for his migraines and is now on Elavil and he thinks that is helped. He still has the ringworm on his right ankle    Past Medical History:   Diagnosis Date    Acid reflux     Adenomatous goiter     2013 left thyroidectomy  Dr Zan Bower Arthralgia of multiple sites     Arthritis     osteo    Chronic back pain     Effusion of patella     Gastric ulcer     Knee pain     Medial meniscus tear     Mixed hyperlipidemia     Obesity     Pain of right hand     Postoperative hypothyroidism     left lobectomy, Dr Zan Bower Prediabetes     Prolonged emergence from general anesthesia     fights upon emergence; works in [employer], woke feeling he was at work and being threatened.     Simple goiter     Solitary thyroid nodule     Thyroid disease       Past Surgical History:   Procedure Laterality Date    CHOLECYSTECTOMY, LAPAROSCOPIC      INGUINAL HERNIA REPAIR Right     KNEE ARTHROSCOPY Right     x2    KNEE CARTILAGE SURGERY Left 5/12/2016    ARTHROSCOPY PARTIAL MEDIAL MENISCECTOMY KNEE performed by Heena Vogel DO at [facility], PARTIAL      Left thyroidectomy, Dr. Zan Vasquez, 2013    UPPER GASTROINTESTINAL ENDOSCOPY      VENTRAL HERNIA REPAIR         Family History   Problem Relation Age of Onset    Other Mother hypothyroidism    Cancer Other     Coronary Art Dis Other        Social History     Tobacco Use    Smoking status: Never Smoker    Smokeless tobacco: Never Used   Substance Use Topics    Alcohol use: No      Current Outpatient Medications   Medication Sig Dispense Refill    amitriptyline (ELAVIL) 25 MG tablet TAKE 1 TABLET BY MOUTH NIGHTLY 30 tablet 2    losartan (COZAAR) 25 MG tablet Take 1 tablet by mouth daily 30 tablet 5    chlorproMAZINE (THORAZINE) 50 MG tablet Take 1 tablet by mouth nightly 30 tablet 0    rizatriptan (MAXALT-MLT) 10 MG disintegrating tablet Take 1 tablet by mouth once as needed for Migraine May repeat in 2 hours if needed (Patient not taking: Reported on 12/2/2019) 30 tablet 3    levothyroxine (SYNTHROID) 100 MCG tablet Take 1 tablet by mouth Daily 30 tablet 5    clotrimazole-betamethasone (LOTRISONE) 1-0.05 % cream Apply topically 2 times daily. 1 Tube 1    glyBURIDE (DIABETA) 5 MG tablet Take 1 tablet by mouth daily (with breakfast) 30 tablet 3     No current facility-administered medications for this visit. No Known Allergies      Subjective:     Review of Systems   Constitutional: Positive for fatigue. Negative for activity change, appetite change and fever. HENT: Negative for congestion, hearing loss, sinus pressure, sore throat and trouble swallowing. Eyes: Negative for discharge and itching. Respiratory: Negative for shortness of breath and wheezing. Cardiovascular: Negative for chest pain, palpitations and leg swelling. Gastrointestinal: Negative for abdominal distention, abdominal pain, blood in stool, nausea and vomiting. Endocrine: Negative for cold intolerance, heat intolerance and polydipsia. Genitourinary: Negative for flank pain, frequency, hematuria and urgency. Musculoskeletal: Negative for arthralgias, back pain and joint swelling. Skin: Negative for rash and wound.    Allergic/Immunologic: Negative for environmental allergies and food allergies. Neurological: Positive for headaches. Negative for dizziness, tremors, syncope, weakness and numbness. Hematological: Negative for adenopathy. Psychiatric/Behavioral: Negative for agitation and hallucinations. The patient is not nervous/anxious. Objective:      /60   Pulse 88   Ht 6' 4\" (1.93 m)   Wt 295 lb (133.8 kg)   SpO2 97%   BMI 35.91 kg/m²    Physical Exam  Constitutional:       General: He is not in acute distress. Appearance: He is well-developed. He is not diaphoretic. HENT:      Head: Normocephalic and atraumatic. Right Ear: External ear normal.      Left Ear: External ear normal.      Nose: Nose normal.      Mouth/Throat:      Pharynx: No oropharyngeal exudate. Eyes:      General: No scleral icterus. Right eye: No discharge. Left eye: No discharge. Conjunctiva/sclera: Conjunctivae normal.      Pupils: Pupils are equal, round, and reactive to light. Neck:      Musculoskeletal: Normal range of motion and neck supple. Thyroid: No thyromegaly. Vascular: No JVD. Trachea: No tracheal deviation. Cardiovascular:      Rate and Rhythm: Normal rate and regular rhythm. Heart sounds: Normal heart sounds. No murmur. No friction rub. No gallop. Pulmonary:      Effort: Pulmonary effort is normal. No respiratory distress. Breath sounds: Normal breath sounds. No wheezing or rales. Abdominal:      General: Bowel sounds are normal. There is no distension. Palpations: Abdomen is soft. There is no mass. Tenderness: There is no tenderness. There is no guarding or rebound. Musculoskeletal: Normal range of motion. General: No tenderness or deformity. Lymphadenopathy:      Cervical: No cervical adenopathy. Skin:     General: Skin is warm and dry. Coloration: Skin is not pale. Findings: No erythema or rash.       Comments: Still present on ankle is a ringworm   Neurological:      Mental Status: He maintenance. Instructed to continue current medications, diet and exercise. Patient agreed with treatment plan. **This report has been created usingvoice recognition software. It may contain minor errors which are inherent in voicerecognition technology. **    Electronically signed by Gail Milan MD on 1/10/2020 at 9:33 AM

## 2020-01-13 RX ORDER — GLYBURIDE 5 MG/1
5 TABLET ORAL
Qty: 30 TABLET | Refills: 3 | Status: SHIPPED | OUTPATIENT
Start: 2020-01-13 | End: 2020-12-08

## 2020-01-13 NOTE — TELEPHONE ENCOUNTER
[facility] called requesting a refill of the below medication which has been pended for you:     Requested Prescriptions     Pending Prescriptions Disp Refills    glyBURIDE (DIABETA) 5 MG tablet [Pharmacy Med Name: GLYBURIDE 5 MG TABLET 5 TAB] 30 tablet 3     Sig: TAKE 1 TABLET BY MOUTH DAILY (WITH BREAKFAST)       Last Appointment Date: 1/10/2020  Next Appointment Date: 5/15/2020    No Known Allergies

## 2020-02-12 ENCOUNTER — OFFICE VISIT (OUTPATIENT)
Dept: INTERNAL MEDICINE | Age: 50
End: 2020-02-12
Payer: COMMERCIAL

## 2020-02-12 VITALS
WEIGHT: 304 LBS | HEART RATE: 82 BPM | DIASTOLIC BLOOD PRESSURE: 84 MMHG | HEIGHT: 76 IN | OXYGEN SATURATION: 95 % | BODY MASS INDEX: 37.02 KG/M2 | SYSTOLIC BLOOD PRESSURE: 120 MMHG | TEMPERATURE: 97.8 F

## 2020-02-12 LAB
INFLUENZA A ANTIBODY: NORMAL
INFLUENZA B ANTIBODY: NORMAL

## 2020-02-12 PROCEDURE — 99213 OFFICE O/P EST LOW 20 MIN: CPT | Performed by: PHYSICIAN ASSISTANT

## 2020-02-12 PROCEDURE — 87804 INFLUENZA ASSAY W/OPTIC: CPT | Performed by: PHYSICIAN ASSISTANT

## 2020-02-12 RX ORDER — AZITHROMYCIN 250 MG/1
TABLET, FILM COATED ORAL
Qty: 6 TABLET | Refills: 0 | Status: SHIPPED | OUTPATIENT
Start: 2020-02-12 | End: 2020-02-20

## 2020-02-12 ASSESSMENT — ENCOUNTER SYMPTOMS
CHEST TIGHTNESS: 0
RHINORRHEA: 1
VOMITING: 0
PHOTOPHOBIA: 0
NAUSEA: 1
BACK PAIN: 0
SHORTNESS OF BREATH: 0
EYE PAIN: 0
COUGH: 0
DIARRHEA: 0
SORE THROAT: 0
EYE REDNESS: 0
CONSTIPATION: 0
WHEEZING: 0
COLOR CHANGE: 0
SINUS PRESSURE: 0
ABDOMINAL PAIN: 0

## 2020-02-12 NOTE — PROGRESS NOTES
ulcer     Knee pain     Medial meniscus tear     Obesity     Pain of right hand     Prolonged emergence from general anesthesia     Simple goiter     Solitary thyroid nodule     Thyroid disease        Past Medical History:   Diagnosis Date    Acid reflux     Adenomatous goiter     2013 left thyroidectomy  Dr Mounika Koehler Arthralgia of multiple sites     Arthritis     osteo    Chronic back pain     Effusion of patella     Gastric ulcer     Knee pain     Medial meniscus tear     Mixed hyperlipidemia     Obesity     Pain of right hand     Postoperative hypothyroidism     left lobectomy, Dr Mounika Koehler Prediabetes     Prolonged emergence from general anesthesia     fights upon emergence; works in [employer], woke feeling he was at work and being threatened.  Simple goiter     Solitary thyroid nodule     Thyroid disease        Prior to Visit Medications    Medication Sig Taking? Authorizing Provider   azithromycin (ZITHROMAX) 250 MG tablet 500mg on day 1 followed by 250mg on days 2 - 5 Yes ALEIDA Smith   glyBURIDE (DIABETA) 5 MG tablet TAKE 1 TABLET BY MOUTH DAILY (WITH BREAKFAST) Yes Griffin Sanon MD   amitriptyline (ELAVIL) 25 MG tablet TAKE 1 TABLET BY MOUTH NIGHTLY Yes Griffin Sanon MD   losartan (COZAAR) 25 MG tablet Take 1 tablet by mouth daily Yes DENIS Wright   chlorproMAZINE (THORAZINE) 50 MG tablet Take 1 tablet by mouth nightly Yes DENIS Wright   rizatriptan (MAXALT-MLT) 10 MG disintegrating tablet Take 1 tablet by mouth once as needed for Migraine May repeat in 2 hours if needed Yes Griffin Sanon MD   levothyroxine (SYNTHROID) 100 MCG tablet Take 1 tablet by mouth Daily Yes Griffin Sanon MD   clotrimazole-betamethasone (LOTRISONE) 1-0.05 % cream Apply topically 2 times daily.  Yes Griffin Sanon MD       Past Surgical History:   Procedure Laterality Date    CHOLECYSTECTOMY, LAPAROSCOPIC      INGUINAL HERNIA REPAIR Right     KNEE ARTHROSCOPY Right     x2    KNEE CARTILAGE SURGERY Left 5/12/2016    ARTHROSCOPY PARTIAL MEDIAL MENISCECTOMY KNEE performed by Governor DO Queta at [facility], PARTIAL      Left thyroidectomy, Dr. Gissell Kaiser, 2013    UPPER GASTROINTESTINAL ENDOSCOPY      VENTRAL HERNIA REPAIR         Family History   Problem Relation Age of Onset    Other Mother         hypothyroidism    Cancer Other     Coronary Art Dis Other        No Known Allergies    Social History     Socioeconomic History    Marital status:      Spouse name: Not on file    Number of children: Not on file    Years of education: Not on file    Highest education level: Not on file   Occupational History    Occupation:    Social Needs    Financial resource strain: Not on file    Food insecurity:     Worry: Not on file     Inability: Not on file    Transportation needs:     Medical: Not on file     Non-medical: Not on file   Tobacco Use    Smoking status: Never Smoker    Smokeless tobacco: Never Used   Substance and Sexual Activity    Alcohol use: No    Drug use: No    Sexual activity: Yes     Partners: Female   Lifestyle    Physical activity:     Days per week: Not on file     Minutes per session: Not on file    Stress: Not on file   Relationships    Social connections:     Talks on phone: Not on file     Gets together: Not on file     Attends Restorationist service: Not on file     Active member of club or organization: Not on file     Attends meetings of clubs or organizations: Not on file     Relationship status: Not on file    Intimate partner violence:     Fear of current or ex partner: Not on file     Emotionally abused: Not on file     Physically abused: Not on file     Forced sexual activity: Not on file   Other Topics Concern    Not on file   Social History Narrative    Not on file       Review of Systems  Review of Systems   Constitutional: Positive for chills, fatigue and fever.  Negative for activity change, appetite change and unexpected weight change. HENT: Positive for congestion, postnasal drip and rhinorrhea. Negative for ear pain, sinus pressure, sneezing and sore throat. Eyes: Negative for photophobia, pain and redness. Respiratory: Negative for cough, chest tightness, shortness of breath and wheezing. Cardiovascular: Negative for chest pain, palpitations and leg swelling. Gastrointestinal: Positive for nausea. Negative for abdominal pain, constipation, diarrhea and vomiting. Genitourinary: Negative for dysuria, frequency, hematuria and urgency. Musculoskeletal: Negative for arthralgias, back pain, gait problem, joint swelling and myalgias. Skin: Negative for color change, pallor and wound. Neurological: Negative for dizziness, speech difficulty, weakness, light-headedness, numbness and headaches. Hematological: Negative for adenopathy. Does not bruise/bleed easily. Psychiatric/Behavioral: Negative for confusion. The patient is not nervous/anxious. Current Outpatient Medications   Medication Sig Dispense Refill    azithromycin (ZITHROMAX) 250 MG tablet 500mg on day 1 followed by 250mg on days 2 - 5 6 tablet 0    glyBURIDE (DIABETA) 5 MG tablet TAKE 1 TABLET BY MOUTH DAILY (WITH BREAKFAST) 30 tablet 3    amitriptyline (ELAVIL) 25 MG tablet TAKE 1 TABLET BY MOUTH NIGHTLY 30 tablet 2    losartan (COZAAR) 25 MG tablet Take 1 tablet by mouth daily 30 tablet 5    chlorproMAZINE (THORAZINE) 50 MG tablet Take 1 tablet by mouth nightly 30 tablet 0    rizatriptan (MAXALT-MLT) 10 MG disintegrating tablet Take 1 tablet by mouth once as needed for Migraine May repeat in 2 hours if needed 30 tablet 3    levothyroxine (SYNTHROID) 100 MCG tablet Take 1 tablet by mouth Daily 30 tablet 5    clotrimazole-betamethasone (LOTRISONE) 1-0.05 % cream Apply topically 2 times daily. 1 Tube 1     No current facility-administered medications for this visit.         /84 Chills (without fever) R68.83 POCT Influenza A/B   2. Stuffy and runny nose J34.89    3. Upper respiratory tract infection, unspecified type J06.9 azithromycin (ZITHROMAX) 250 MG tablet   4. Congestion of nasal sinus R09.81        PLAN  Discussed with patient to continue taking the Tylenol for fever and chills. Patient tested negative for the flu. Patient may be getting early upper respiratory infection I went ahead and started patient on azithromycin. Told patient if he is not feeling better tomorrow we could retest him for the flu. Patient can take some Zyrtec for the runny nose patient can take some Mucinex or Delsym if he starts having any coughing. Patient follow-up as needed for chest pain, shortness of breath, increase in fever and chills or as needed if not improving. Orders Placed This Encounter   Procedures    POCT Influenza A/B        Return if symptoms worsen or fail to improve. All questions answered. Patient voices understanding and agrees to plans along with risks and benefits of plan. Patient is instructed to continue prior medications, diet, and exercise plans as instructed. Patient agrees to follow up as instructions, sooner if needed, or to go to ER if condition becomes emergent. Additional Instructions: As always, patient is advised to bring in medication bottles in order to correctly reconcile with our current list.      Meng Woo PA-C    EMR Dragon/transcription disclaimer: Much of this encounter note is electronic transcription/translation of spoken language to printed text. The electronictranslation of spoken language may be erroneous, or at times, nonsensical words or phrases may be inadvertently transcribed.  Although I have reviewed the note for such errors, some may still exist.

## 2020-02-20 ENCOUNTER — OFFICE VISIT (OUTPATIENT)
Dept: INTERNAL MEDICINE | Age: 50
End: 2020-02-20
Payer: COMMERCIAL

## 2020-02-20 VITALS — HEART RATE: 67 BPM | DIASTOLIC BLOOD PRESSURE: 60 MMHG | SYSTOLIC BLOOD PRESSURE: 107 MMHG

## 2020-02-20 PROBLEM — B02.9 HERPES ZOSTER WITHOUT COMPLICATION: Status: ACTIVE | Noted: 2020-02-20

## 2020-02-20 PROCEDURE — 99213 OFFICE O/P EST LOW 20 MIN: CPT | Performed by: INTERNAL MEDICINE

## 2020-02-20 RX ORDER — TRAMADOL HYDROCHLORIDE 50 MG/1
50 TABLET ORAL EVERY 4 HOURS PRN
Qty: 30 TABLET | Refills: 0 | Status: SHIPPED | OUTPATIENT
Start: 2020-02-20 | End: 2020-03-31 | Stop reason: SDUPTHER

## 2020-02-20 RX ORDER — VALACYCLOVIR HYDROCHLORIDE 1 G/1
1000 TABLET, FILM COATED ORAL 3 TIMES DAILY
Qty: 21 TABLET | Refills: 0 | Status: SHIPPED | OUTPATIENT
Start: 2020-02-20 | End: 2020-02-27

## 2020-02-20 NOTE — PROGRESS NOTES
[facility] INTERNAL MEDICINE  [address] [facility] [id number] [id number]  Dept: [phone]  Dept Fax: [phone]: [phone]      Visit Date: 2/20/2020    Lion Livingston a 52 y.o. male who presents today for:  Chief Complaint   Patient presents with    Other     rash or possible hernia         HPI:     Full rash has been there 3 days on his right anterior thigh up near the groin. It has vesicles and is quite tender to touch. Past Medical History:   Diagnosis Date    Acid reflux     Adenomatous goiter     2013 left thyroidectomy  Dr Mounika Koehler Arthralgia of multiple sites     Arthritis     osteo    Chronic back pain     Effusion of patella     Gastric ulcer     Knee pain     Medial meniscus tear     Mixed hyperlipidemia     Obesity     Pain of right hand     Postoperative hypothyroidism     left lobectomy, Dr Mounika Koehler Prediabetes     Prolonged emergence from general anesthesia     fights upon emergence; works in [employer], woke feeling he was at work and being threatened.     Simple goiter     Solitary thyroid nodule     Thyroid disease       Past Surgical History:   Procedure Laterality Date    CHOLECYSTECTOMY, LAPAROSCOPIC      INGUINAL HERNIA REPAIR Right     KNEE ARTHROSCOPY Right     x2    KNEE CARTILAGE SURGERY Left 5/12/2016    ARTHROSCOPY PARTIAL MEDIAL MENISCECTOMY KNEE performed by Mira Davila DO at [facility], PARTIAL      Left thyroidectomy, Dr. Mounika Downing, 2013    UPPER GASTROINTESTINAL ENDOSCOPY      VENTRAL HERNIA REPAIR         Family History   Problem Relation Age of Onset    Other Mother         hypothyroidism    Cancer Other     Coronary Art Dis Other        Social History     Tobacco Use    Smoking status: Never Smoker    Smokeless tobacco: Never Used   Substance Use Topics    Alcohol use: No      Current Outpatient Medications   Medication Sig Dispense Refill    glyBURIDE (DIABETA) 5 MG tablet TAKE 1 TABLET BY MOUTH DAILY (WITH BREAKFAST) 30 tablet 3    amitriptyline (ELAVIL) 25 MG tablet TAKE 1 TABLET BY MOUTH NIGHTLY 30 tablet 2    losartan (COZAAR) 25 MG tablet Take 1 tablet by mouth daily 30 tablet 5    chlorproMAZINE (THORAZINE) 50 MG tablet Take 1 tablet by mouth nightly 30 tablet 0    rizatriptan (MAXALT-MLT) 10 MG disintegrating tablet Take 1 tablet by mouth once as needed for Migraine May repeat in 2 hours if needed 30 tablet 3    levothyroxine (SYNTHROID) 100 MCG tablet Take 1 tablet by mouth Daily 30 tablet 5    clotrimazole-betamethasone (LOTRISONE) 1-0.05 % cream Apply topically 2 times daily. 1 Tube 1     No current facility-administered medications for this visit. No Known Allergies      Subjective:     Review of Systems   Skin:        Painful rash with vesicles on his right anterior thigh up toward the groin       Objective:      /60   Pulse 67    Physical Exam  Skin:     Comments: aCute episode of herpes zoster on the right anterior thigh up to the groin          Assessment:      Diagnosis Orders   1. Herpes zoster without complication              Plan:     Acute episode of herpes zoster. I will put him on Valtrex 2000 mg 3 times a day for 7 days and give him Ultram 50 mg as needed for pain. Told him to keep it clean and dry and keep his scheduled appointment. No follow-ups on file. Patient given educational materials- see patient instructions. Discussed use, benefit, and side effects of prescribedmedications. All patient questions answered. Pt voiced understanding. Reviewedhealth maintenance. Instructed to continue current medications, diet and exercise. Patient agreed with treatment plan. **This report has been created usingvoice recognition software. It may contain minor errors which are inherent in voicerecognition technology. **    Electronically signed by General Mitchell MD on 2/20/2020 at 10:06 AM

## 2020-03-04 ENCOUNTER — OFFICE VISIT (OUTPATIENT)
Dept: INTERNAL MEDICINE | Age: 50
End: 2020-03-04
Payer: COMMERCIAL

## 2020-03-04 VITALS
WEIGHT: 298 LBS | HEART RATE: 76 BPM | TEMPERATURE: 97.6 F | HEIGHT: 76 IN | DIASTOLIC BLOOD PRESSURE: 80 MMHG | BODY MASS INDEX: 36.29 KG/M2 | OXYGEN SATURATION: 98 % | SYSTOLIC BLOOD PRESSURE: 130 MMHG

## 2020-03-04 PROCEDURE — 99213 OFFICE O/P EST LOW 20 MIN: CPT | Performed by: PHYSICIAN ASSISTANT

## 2020-03-04 RX ORDER — LIDOCAINE 50 MG/G
1 PATCH TOPICAL DAILY
Qty: 30 PATCH | Refills: 0 | Status: SHIPPED | OUTPATIENT
Start: 2020-03-04 | End: 2020-04-03

## 2020-03-04 RX ORDER — METHYLPREDNISOLONE 4 MG/1
TABLET ORAL
Qty: 1 KIT | Refills: 0 | Status: SHIPPED | OUTPATIENT
Start: 2020-03-04 | End: 2020-03-10

## 2020-03-04 ASSESSMENT — ENCOUNTER SYMPTOMS
PHOTOPHOBIA: 0
CHEST TIGHTNESS: 0
EYE PAIN: 0
SINUS PRESSURE: 0
SHORTNESS OF BREATH: 0
VOMITING: 0
COUGH: 0
EYE REDNESS: 0
BACK PAIN: 0
NAUSEA: 0
WHEEZING: 0
COLOR CHANGE: 0
CONSTIPATION: 0
ABDOMINAL PAIN: 0
DIARRHEA: 0
SORE THROAT: 0
RHINORRHEA: 0

## 2020-03-04 NOTE — PROGRESS NOTES
[facility] INTERNAL MEDICINE  [address] [address] [address] [id number]  Dept: [phone]  Dept Fax: [phone]  Loc: [phone]      [facility] INTERNAL MEDICINE  OFFICE NOTE      Chief Complaint   Patient presents with    Other     pain in right groin area, pt states it been swoll up in that area since hes had shingles        Teo Castellanos is a 52y.o. year old male who is seen for application from shingles. Patient was seen on 20 February by Dr. Yenny Montalvo for shingles on the anterior aspect of the right leg. Patient was taking Valtrex and tramadol. Patient states the rash is drying up and getting better but patient continues to have neuropathic pain in the area. Patient states it is making it difficult for him to work due to the pain. Patient states he also has a feeling of numbness in the area above the shingles rash. Patient denies any discharge or bleeding.     Active Ambulatory Problems     Diagnosis Date Noted    Left knee pain 05/09/2016    Hypothyroidism 05/09/2016    Arthritis 05/09/2016    Tear of medial meniscus of left knee 05/12/2016    Prediabetes     Postoperative hypothyroidism     Mixed hyperlipidemia     Acute pansinusitis 05/19/2017    Diverticulitis of large intestine without perforation or abscess 03/15/2018    Chronic fatigue 08/23/2018    Ringworm 07/22/2019    Elevated glucose 09/06/2019    Intractable migraine without aura and without status migrainosus 09/26/2019    Herpes zoster without complication 81/60/6046     Resolved Ambulatory Problems     Diagnosis Date Noted    Cellulitis of left lower extremity 07/22/2019     Past Medical History:   Diagnosis Date    Acid reflux     Adenomatous goiter     Arthralgia of multiple sites     Chronic back pain     Effusion of patella     Gastric ulcer     Knee pain     Medial meniscus tear     Obesity     Pain of right hand     Prolonged emergence from general anesthesia     Negative for ear pain, postnasal drip, rhinorrhea, sinus pressure, sneezing and sore throat. Eyes: Negative for photophobia, pain and redness. Respiratory: Negative for cough, chest tightness, shortness of breath and wheezing. Cardiovascular: Negative for chest pain, palpitations and leg swelling. Gastrointestinal: Negative for abdominal pain, constipation, diarrhea, nausea and vomiting. Genitourinary: Negative for dysuria, frequency, hematuria and urgency. Musculoskeletal: Negative for arthralgias, back pain, gait problem, joint swelling and myalgias. Skin: Positive for rash. Negative for color change, pallor and wound. Patient has shingles type rash on the anterior aspect of the right thigh with numbness and tingling around the rash and above it. Neurological: Negative for dizziness, speech difficulty, weakness, light-headedness, numbness and headaches. Hematological: Negative for adenopathy. Does not bruise/bleed easily. Psychiatric/Behavioral: Negative for confusion. The patient is not nervous/anxious. Current Outpatient Medications   Medication Sig Dispense Refill    lidocaine (LIDODERM) 5 % Place 1 patch onto the skin daily 12 hours on, 12 hours off. 30 patch 0    methylPREDNISolone (MEDROL DOSEPACK) 4 MG tablet Take by mouth.  1 kit 0    glyBURIDE (DIABETA) 5 MG tablet TAKE 1 TABLET BY MOUTH DAILY (WITH BREAKFAST) 30 tablet 3    amitriptyline (ELAVIL) 25 MG tablet TAKE 1 TABLET BY MOUTH NIGHTLY 30 tablet 2    losartan (COZAAR) 25 MG tablet Take 1 tablet by mouth daily 30 tablet 5    chlorproMAZINE (THORAZINE) 50 MG tablet Take 1 tablet by mouth nightly 30 tablet 0    rizatriptan (MAXALT-MLT) 10 MG disintegrating tablet Take 1 tablet by mouth once as needed for Migraine May repeat in 2 hours if needed 30 tablet 3    levothyroxine (SYNTHROID) 100 MCG tablet Take 1 tablet by mouth Daily 30 tablet 5    clotrimazole-betamethasone (LOTRISONE) 1-0.05 % cream Apply

## 2020-03-31 ENCOUNTER — TELEPHONE (OUTPATIENT)
Dept: INTERNAL MEDICINE | Age: 50
End: 2020-03-31

## 2020-03-31 RX ORDER — TRAMADOL HYDROCHLORIDE 50 MG/1
50 TABLET ORAL EVERY 4 HOURS PRN
Qty: 30 TABLET | Refills: 0 | Status: SHIPPED | OUTPATIENT
Start: 2020-03-31 | End: 2020-10-12

## 2020-09-28 RX ORDER — LOSARTAN POTASSIUM 25 MG/1
25 TABLET ORAL DAILY
Qty: 30 TABLET | Refills: 5 | Status: SHIPPED | OUTPATIENT
Start: 2020-09-28 | End: 2022-09-05 | Stop reason: ALTCHOICE

## 2020-09-28 RX ORDER — LEVOTHYROXINE SODIUM 0.1 MG/1
100 TABLET ORAL DAILY
Qty: 30 TABLET | Refills: 5 | Status: SHIPPED | OUTPATIENT
Start: 2020-09-28 | End: 2021-09-10 | Stop reason: SDUPTHER

## 2020-10-09 DIAGNOSIS — B35.9 RINGWORM: ICD-10-CM

## 2020-10-09 DIAGNOSIS — Z00.00 PHYSICAL EXAM, ROUTINE: ICD-10-CM

## 2020-10-09 DIAGNOSIS — L03.116 CELLULITIS OF LEFT LOWER EXTREMITY: ICD-10-CM

## 2020-10-09 DIAGNOSIS — R53.82 CHRONIC FATIGUE: ICD-10-CM

## 2020-10-09 DIAGNOSIS — R73.09 ELEVATED GLUCOSE: ICD-10-CM

## 2020-10-09 DIAGNOSIS — E89.0 POSTOPERATIVE HYPOTHYROIDISM: ICD-10-CM

## 2020-10-09 DIAGNOSIS — E78.2 MIXED HYPERLIPIDEMIA: ICD-10-CM

## 2020-10-09 DIAGNOSIS — E03.9 ACQUIRED HYPOTHYROIDISM: ICD-10-CM

## 2020-10-09 LAB
ALBUMIN SERPL-MCNC: 4.1 G/DL (ref 3.5–5.2)
ALP BLD-CCNC: 81 U/L (ref 40–130)
ALT SERPL-CCNC: 45 U/L (ref 5–41)
ANION GAP SERPL CALCULATED.3IONS-SCNC: 9 MMOL/L (ref 7–19)
AST SERPL-CCNC: 25 U/L (ref 5–40)
BASOPHILS ABSOLUTE: 0 K/UL (ref 0–0.2)
BASOPHILS RELATIVE PERCENT: 0.3 % (ref 0–1)
BILIRUB SERPL-MCNC: 0.5 MG/DL (ref 0.2–1.2)
BUN BLDV-MCNC: 27 MG/DL (ref 6–20)
CALCIUM SERPL-MCNC: 8.8 MG/DL (ref 8.6–10)
CHLORIDE BLD-SCNC: 100 MMOL/L (ref 98–111)
CHOLESTEROL, TOTAL: 176 MG/DL (ref 160–199)
CO2: 28 MMOL/L (ref 22–29)
CREAT SERPL-MCNC: 0.9 MG/DL (ref 0.5–1.2)
EOSINOPHILS ABSOLUTE: 0.2 K/UL (ref 0–0.6)
EOSINOPHILS RELATIVE PERCENT: 1.7 % (ref 0–5)
GFR AFRICAN AMERICAN: >59
GFR NON-AFRICAN AMERICAN: >60
GLUCOSE BLD-MCNC: 115 MG/DL (ref 74–109)
HBA1C MFR BLD: 6.9 % (ref 4–6)
HCT VFR BLD CALC: 45.6 % (ref 42–52)
HDLC SERPL-MCNC: 41 MG/DL (ref 55–121)
HEMOGLOBIN: 14.4 G/DL (ref 14–18)
IMMATURE GRANULOCYTES #: 0 K/UL
LDL CHOLESTEROL CALCULATED: 114 MG/DL
LYMPHOCYTES ABSOLUTE: 2.5 K/UL (ref 1.1–4.5)
LYMPHOCYTES RELATIVE PERCENT: 27.6 % (ref 20–40)
MCH RBC QN AUTO: 27 PG (ref 27–31)
MCHC RBC AUTO-ENTMCNC: 31.6 G/DL (ref 33–37)
MCV RBC AUTO: 85.6 FL (ref 80–94)
MONOCYTES ABSOLUTE: 0.6 K/UL (ref 0–0.9)
MONOCYTES RELATIVE PERCENT: 6.6 % (ref 0–10)
NEUTROPHILS ABSOLUTE: 5.6 K/UL (ref 1.5–7.5)
NEUTROPHILS RELATIVE PERCENT: 63.5 % (ref 50–65)
PDW BLD-RTO: 14 % (ref 11.5–14.5)
PLATELET # BLD: 262 K/UL (ref 130–400)
PMV BLD AUTO: 9 FL (ref 9.4–12.4)
POTASSIUM SERPL-SCNC: 4.5 MMOL/L (ref 3.5–5)
PROSTATE SPECIFIC ANTIGEN: 0.54 NG/ML (ref 0–4)
RBC # BLD: 5.33 M/UL (ref 4.7–6.1)
SODIUM BLD-SCNC: 137 MMOL/L (ref 136–145)
T4 FREE: 0.99 NG/DL (ref 0.93–1.7)
TESTOSTERONE TOTAL: 321.7 NG/DL (ref 193–740)
TOTAL PROTEIN: 7 G/DL (ref 6.6–8.7)
TRIGL SERPL-MCNC: 107 MG/DL (ref 0–149)
TSH SERPL DL<=0.05 MIU/L-ACNC: 1.35 UIU/ML (ref 0.27–4.2)
WBC # BLD: 8.9 K/UL (ref 4.8–10.8)

## 2020-10-12 ENCOUNTER — OFFICE VISIT (OUTPATIENT)
Dept: INTERNAL MEDICINE | Age: 50
End: 2020-10-12
Payer: COMMERCIAL

## 2020-10-12 VITALS
OXYGEN SATURATION: 98 % | DIASTOLIC BLOOD PRESSURE: 80 MMHG | SYSTOLIC BLOOD PRESSURE: 128 MMHG | WEIGHT: 290 LBS | BODY MASS INDEX: 35.31 KG/M2 | HEART RATE: 88 BPM | HEIGHT: 76 IN

## 2020-10-12 PROBLEM — I10 ESSENTIAL HYPERTENSION: Status: ACTIVE | Noted: 2020-10-12

## 2020-10-12 PROCEDURE — 99214 OFFICE O/P EST MOD 30 MIN: CPT | Performed by: INTERNAL MEDICINE

## 2020-10-12 RX ORDER — RIZATRIPTAN BENZOATE 10 MG/1
10 TABLET, ORALLY DISINTEGRATING ORAL
Qty: 30 TABLET | Refills: 3 | Status: SHIPPED | OUTPATIENT
Start: 2020-10-12 | End: 2022-05-11 | Stop reason: ALTCHOICE

## 2020-10-12 ASSESSMENT — ENCOUNTER SYMPTOMS
TROUBLE SWALLOWING: 0
EYE ITCHING: 0
SINUS PRESSURE: 0
VOMITING: 0
WHEEZING: 0
ABDOMINAL PAIN: 0
NAUSEA: 0
ABDOMINAL DISTENTION: 0
EYE DISCHARGE: 0
SHORTNESS OF BREATH: 0
BLOOD IN STOOL: 0
SORE THROAT: 0
BACK PAIN: 0

## 2020-10-12 NOTE — PROGRESS NOTES
[address] [facility]  [address] [address] [place] [id number]  Dept: [phone]  Dept Fax: [phone]: [phone]      Visit Date: 10/12/2020    Kamilla Tolbert Jr.is a 48 y.o. male who presents today for:  Chief Complaint   Patient presents with    Annual Exam         HPI:     Patient is 48years old in for his annual exam.  He has hypothyroidism prediabetes diverticulitis and arthritis. He stays fatigued a lot also has migraine headaches and is doing okay. He had lab work for review. Past Medical History:   Diagnosis Date    Acid reflux     Adenomatous goiter     2013 left thyroidectomy  Dr Stephany Iglesias Arthralgia of multiple sites     Arthritis     osteo    Chronic back pain     Effusion of patella     Gastric ulcer     Knee pain     Medial meniscus tear     Mixed hyperlipidemia     Obesity     Pain of right hand     Postoperative hypothyroidism     left lobectomy, Dr Stephany Iglesias Prediabetes     Prolonged emergence from general anesthesia     fights upon emergence; works in [employer], woke feeling he was at work and being threatened.  Simple goiter     Solitary thyroid nodule     Thyroid disease       Past Surgical History:   Procedure Laterality Date    CHOLECYSTECTOMY, LAPAROSCOPIC      INGUINAL HERNIA REPAIR Right     KNEE ARTHROSCOPY Right     x2    KNEE CARTILAGE SURGERY Left 5/12/2016    ARTHROSCOPY PARTIAL MEDIAL MENISCECTOMY KNEE performed by Veronica Foster DO at [facility], PARTIAL      Left thyroidectomy, Dr. Stephany Griffin, 2013    UPPER GASTROINTESTINAL ENDOSCOPY      VENTRAL HERNIA REPAIR         Family History   Problem Relation Age of Onset    Other Mother         hypothyroidism    Cancer Other     Coronary Art Dis Other        Social History     Tobacco Use    Smoking status: Never Smoker    Smokeless tobacco: Never Used   Substance Use Topics    Alcohol use:  No Current Outpatient Medications   Medication Sig Dispense Refill    rizatriptan (MAXALT-MLT) 10 MG disintegrating tablet Take 1 tablet by mouth once as needed for Migraine May repeat in 2 hours if needed 30 tablet 3    losartan (COZAAR) 25 MG tablet Take 1 tablet by mouth daily 30 tablet 5    levothyroxine (SYNTHROID) 100 MCG tablet Take 1 tablet by mouth Daily 30 tablet 5    glyBURIDE (DIABETA) 5 MG tablet TAKE 1 TABLET BY MOUTH DAILY (WITH BREAKFAST) 30 tablet 3    amitriptyline (ELAVIL) 25 MG tablet TAKE 1 TABLET BY MOUTH NIGHTLY 30 tablet 2    chlorproMAZINE (THORAZINE) 50 MG tablet Take 1 tablet by mouth nightly 30 tablet 0    clotrimazole-betamethasone (LOTRISONE) 1-0.05 % cream Apply topically 2 times daily. 1 Tube 1     No current facility-administered medications for this visit. No Known Allergies      Subjective:     Review of Systems   Constitutional: Positive for fatigue. Negative for activity change, appetite change and fever. HENT: Negative for congestion, hearing loss, sinus pressure, sore throat and trouble swallowing. Eyes: Negative for discharge and itching. Respiratory: Negative for shortness of breath and wheezing. Cardiovascular: Negative for chest pain, palpitations and leg swelling. Gastrointestinal: Negative for abdominal distention, abdominal pain, blood in stool, nausea and vomiting. Endocrine: Negative for cold intolerance, heat intolerance and polydipsia. Genitourinary: Negative for flank pain, frequency, hematuria and urgency. Musculoskeletal: Positive for arthralgias. Negative for back pain and joint swelling. Skin: Negative for rash and wound. Allergic/Immunologic: Negative for environmental allergies and food allergies. Neurological: Negative for dizziness, tremors, syncope, weakness, numbness and headaches. Hematological: Negative for adenopathy. Psychiatric/Behavioral: Negative for agitation and hallucinations.  The patient is not nervous/anxious. Objective:      /80   Pulse 88   Ht 6' 4\" (1.93 m)   Wt 290 lb (131.5 kg)   SpO2 98%   BMI 35.30 kg/m²    Physical Exam  Constitutional:       General: He is not in acute distress. Appearance: He is well-developed. He is not diaphoretic. HENT:      Head: Normocephalic and atraumatic. Right Ear: External ear normal.      Left Ear: External ear normal.      Nose: Nose normal.      Mouth/Throat:      Pharynx: No oropharyngeal exudate. Eyes:      General: No scleral icterus. Right eye: No discharge. Left eye: No discharge. Conjunctiva/sclera: Conjunctivae normal.      Pupils: Pupils are equal, round, and reactive to light. Neck:      Musculoskeletal: Normal range of motion and neck supple. Thyroid: No thyromegaly. Vascular: No JVD. Trachea: No tracheal deviation. Cardiovascular:      Rate and Rhythm: Normal rate and regular rhythm. Heart sounds: Normal heart sounds. No murmur. No friction rub. No gallop. Pulmonary:      Effort: Pulmonary effort is normal. No respiratory distress. Breath sounds: Normal breath sounds. No wheezing or rales. Abdominal:      General: Bowel sounds are normal. There is no distension. Palpations: Abdomen is soft. There is no mass. Tenderness: There is no abdominal tenderness. There is no guarding or rebound. Musculoskeletal: Normal range of motion. General: No tenderness or deformity. Lymphadenopathy:      Cervical: No cervical adenopathy. Skin:     General: Skin is warm and dry. Coloration: Skin is not pale. Findings: No erythema or rash. Neurological:      Mental Status: He is alert and oriented to person, place, and time. Cranial Nerves: No cranial nerve deficit. Motor: No abnormal muscle tone. Coordination: Coordination normal.      Deep Tendon Reflexes: Reflexes are normal and symmetric.  Reflexes normal.   Psychiatric:         Behavior: Behavior normal.         Thought Content: Thought content normal.         Judgment: Judgment normal.          Assessment:      Diagnosis Orders   1. Arthritis  Comprehensive Metabolic Panel    Hemoglobin A1C    Lipid Panel    T4, Free    TSH without Reflex   2. Elevated glucose  Comprehensive Metabolic Panel    Hemoglobin A1C    Lipid Panel    T4, Free    TSH without Reflex   3. Acquired hypothyroidism  Comprehensive Metabolic Panel    Hemoglobin A1C    Lipid Panel    T4, Free    TSH without Reflex   4. Intractable migraine without aura and without status migrainosus  Comprehensive Metabolic Panel    Hemoglobin A1C    Lipid Panel    T4, Free    TSH without Reflex   5. Mixed hyperlipidemia  Comprehensive Metabolic Panel    Hemoglobin A1C    Lipid Panel    T4, Free    TSH without Reflex   6. Essential hypertension  Comprehensive Metabolic Panel    Hemoglobin A1C    Lipid Panel    T4, Free    TSH without Reflex            Plan:     Arthritis which is stable. He said no new changes. Has a lot of joint aches and pains and takes over-the-counter NSAIDs as needed. Elevated glucose. He is on DiaBeta 5 mg daily. Hemoglobin A1c went from 6.6-6.9. He needs to watch his diet and exercise more. Hypothyroidism this is stable and he is euthyroid on his lab he is on Synthroid 100 mcg daily as well as continue the same dose. Hyperlipidemia. He continues to try to watch his diet and exercise. Lipids are up a little bit he needs to increase his cardiovascular workout. Hypertension  He is otherwise stable. Blood pressures under good control with a blood pressure 128/80 on his losartan. He is going to continue the same doses. Of increase his diet and exercise. He refuses a flu shot. We will see him back again in 6 months to recheck sugar cholesterol and blood pressure as well as thyroid. Return in about 6 months (around 4/12/2021) for liver ,lipid check, thyroid check, diabetes check.      Patient given

## 2020-10-15 ENCOUNTER — OFFICE VISIT (OUTPATIENT)
Dept: INTERNAL MEDICINE | Age: 50
End: 2020-10-15
Payer: COMMERCIAL

## 2020-10-15 ENCOUNTER — TELEPHONE (OUTPATIENT)
Dept: INTERNAL MEDICINE | Age: 50
End: 2020-10-15

## 2020-10-15 VITALS — SYSTOLIC BLOOD PRESSURE: 108 MMHG | DIASTOLIC BLOOD PRESSURE: 68 MMHG

## 2020-10-15 PROBLEM — R10.31 RIGHT INGUINAL PAIN: Status: ACTIVE | Noted: 2020-10-15

## 2020-10-15 PROCEDURE — 99213 OFFICE O/P EST LOW 20 MIN: CPT | Performed by: INTERNAL MEDICINE

## 2020-10-15 ASSESSMENT — ENCOUNTER SYMPTOMS: ABDOMINAL PAIN: 1

## 2020-10-15 NOTE — PROGRESS NOTES
[address] [facility]  [address] [address] [address] [place] [id number]  Dept: [phone]  Dept Fax: [phone]: [phone]      Visit Date: 10/15/2020    Mirtha Jacobs Jr.is a 48 y.o. male who presents today for:  Chief Complaint   Patient presents with    Other     rt groin pain while splitting wood         HPI:     Patient had a episode yesterday while he was chopping wood and lifted a log and felt some intense pain in his right groin. Since that time he has had increased pain and he feels like he has had a bulge in the right lower inguinal area. Past Medical History:   Diagnosis Date    Acid reflux     Adenomatous goiter     2013 left thyroidectomy  Dr Daisy Busch   [facility] Arthralgia of multiple sites     Arthritis     osteo    Chronic back pain     Effusion of patella     Gastric ulcer     Knee pain     Medial meniscus tear     Mixed hyperlipidemia     Obesity     Pain of right hand     Postoperative hypothyroidism     left lobectomy, Dr Daisy Busch    [facility] Prediabetes     Prolonged emergence from general anesthesia     fights upon emergence; works in [employer], woke feeling he was at work and being threatened.     Simple goiter     Solitary thyroid nodule     Thyroid disease       Past Surgical History:   Procedure Laterality Date    CHOLECYSTECTOMY, LAPAROSCOPIC      INGUINAL HERNIA REPAIR Right     KNEE ARTHROSCOPY Right     x2    KNEE CARTILAGE SURGERY Left 5/12/2016    ARTHROSCOPY PARTIAL MEDIAL MENISCECTOMY KNEE performed by Ryan Due[facility]as DO at [facility], PARTIAL      Left thyroidectomy, Dr. Daisy Busch, 2013    UPPER GASTROINTESTINAL ENDOSCOPY      VENTRAL HERNIA REPAIR         Family History   Problem Relation Age of Onset    Other Mother         hypothyroidism    Cancer Other     Coronary Art Dis Other        Social History     Tobacco Use    Smoking status: Never Smoker    Smokeless tobacco: and large. No follow-ups on file. Patient given educational materials- see patient instructions. Discussed use, benefit, and side effects of prescribedmedications. All patient questions answered. Pt voiced understanding. Reviewedhealth maintenance. Instructed to continue current medications, diet and exercise. Patient agreed with treatment plan. **This report has been created usingvoice recognition software. It may contain minor errors which are inherent in voicerecognition technology. **    Electronically signed by Nam Benson MD on 10/15/2020 at 9:10 AM

## 2020-10-16 ENCOUNTER — TELEPHONE (OUTPATIENT)
Dept: INTERNAL MEDICINE | Age: 50
End: 2020-10-16

## 2020-10-16 RX ORDER — ONDANSETRON 4 MG/1
4 TABLET, FILM COATED ORAL 3 TIMES DAILY PRN
Qty: 30 TABLET | Refills: 0 | Status: SHIPPED | OUTPATIENT
Start: 2020-10-16 | End: 2022-09-05 | Stop reason: ALTCHOICE

## 2020-10-20 ENCOUNTER — OFFICE VISIT (OUTPATIENT)
Dept: SURGERY | Age: 50
End: 2020-10-20
Payer: COMMERCIAL

## 2020-10-20 VITALS
TEMPERATURE: 97.9 F | BODY MASS INDEX: 36.36 KG/M2 | DIASTOLIC BLOOD PRESSURE: 78 MMHG | WEIGHT: 298.6 LBS | HEIGHT: 76 IN | SYSTOLIC BLOOD PRESSURE: 130 MMHG

## 2020-10-20 PROCEDURE — 99201 PR OFFICE OUTPATIENT NEW 10 MINUTES: CPT | Performed by: SURGERY

## 2020-10-20 RX ORDER — IBUPROFEN 800 MG/1
800 TABLET ORAL
Qty: 90 TABLET | Refills: 1 | Status: SHIPPED | OUTPATIENT
Start: 2020-10-20 | End: 2021-04-21 | Stop reason: ALTCHOICE

## 2020-10-20 NOTE — LETTER
[facility] General Surgery  [address]  Phone: [phone]  Fax: [phone]    Leann Solomon MD        October 20, 2020     Patient: Briana Man. YOB: 1970   Date of Visit: 10/20/2020       To Whom It May Concern: It is my medical opinion that Ciro Mack should remain out of work until I re-evaluate him in my office in 2 weeks. If you have any questions or concerns, please don't hesitate to call.     Sincerely,        Leann Solomon MD

## 2020-10-20 NOTE — PROGRESS NOTES
S: Mr. Mirlande Ontiveros is a very pleasant 15-year-old gentleman referred for the evaluation of right groin pain and a possible right inguinal hernia. Last week he was splitting wood with his hydraulic splitter. He bent over and lifted up a log and turned it in as he did so he felt a popping sensation and heard a loud crack coming from his right groin. He has had significant pain ever since. He saw a bulge initially but does not appreciate 1 now. Pain is better when he relaxes worse when he is moving in particular when he moves from lying to sitting or sitting to standing. No fever or chills. No nausea, vomiting or change in bowel habits. He continues to pass his urine without problem. O: Abdomen is soft and nontender. No mass appreciated. The right groin shows marked tenderness with palpation along the inguinal ligament and inguinal crease. I cannot appreciate a hernia on careful exam both standing and lying supine. The right cord and testicle are unremarkable. Left groin is also without evidence of a hernia in the cord and testicle are also unremarkable. A: 1. Acute right groin strain without evidence of a hernia on exam today. P: 1. I reviewed groin strains and their management with Mr. Mirlande Ontiveros.  I stressed the need to avoid heavy lifting or straining. He will also use a heating pad as desired. 2.  I sent prescription for Motrin 801 p.o. 3 times daily to the pharmacy. 3.  I cautioned him that it may take a week or 2 before the acute pain subsides but that he should see improvement steadily beginning in just a day or 2.      4.  Follow-up with me in about 1 month for recheck to see how he is progressing and to check that an inguinal hernia has not developed in the interim.

## 2020-10-22 ENCOUNTER — TELEPHONE (OUTPATIENT)
Dept: SURGERY | Age: 50
End: 2020-10-22

## 2020-10-23 ENCOUNTER — TELEPHONE (OUTPATIENT)
Dept: INTERNAL MEDICINE | Age: 50
End: 2020-10-23

## 2020-10-23 NOTE — TELEPHONE ENCOUNTER
Pt called c/o groin pain being no better. Ask him to call Dr Cliff Avelar back for suggestion. He had put him on bedrest for 10 days for poss pulled muscle. Then testing if no better.

## 2020-11-03 ENCOUNTER — OFFICE VISIT (OUTPATIENT)
Dept: SURGERY | Age: 50
End: 2020-11-03
Payer: COMMERCIAL

## 2020-11-03 VITALS
BODY MASS INDEX: 36.29 KG/M2 | SYSTOLIC BLOOD PRESSURE: 120 MMHG | WEIGHT: 298 LBS | TEMPERATURE: 97.4 F | HEIGHT: 76 IN | DIASTOLIC BLOOD PRESSURE: 74 MMHG

## 2020-11-03 PROCEDURE — 99212 OFFICE O/P EST SF 10 MIN: CPT | Performed by: SURGERY

## 2020-11-03 RX ORDER — OXYCODONE HYDROCHLORIDE AND ACETAMINOPHEN 5; 325 MG/1; MG/1
1 TABLET ORAL EVERY 4 HOURS PRN
Qty: 15 TABLET | Refills: 0 | Status: SHIPPED | OUTPATIENT
Start: 2020-11-03 | End: 2020-11-08

## 2020-11-03 NOTE — PROGRESS NOTES
S: Mr. Mirlande Ontiveros returns today for reevaluation of his right groin pain. He notes that there is been no improvement since I saw him in the office about 10 days ago. He notes that the pain is present most all the time worse whenever he bends or lifts. He notes no bulge in the groin. He denies recent nausea, vomiting or change in bowel habit. He is able to pass his urine without problem. O: The abdomen is soft. There is no abdominal tenderness noted. No mass appreciated. The right groin is without evidence of a hernia both supine and standing. The cord and testicle are unremarkable. He has point tenderness at the point where the inguinal ligament inserts onto the pubic tubercle. This is unchanged from my previous exam.    A: 1. Persistent right groin pain. I still suspect he has a right groin strain as I cannot appreciate a hernia or other abnormality on exam.    P: 1.  Obtain a CT scan of the pelvis to be sure no other pathology is present that I cannot appreciate on exam.      2.  I sent a prescription for Percocet 5/3/2025 to his pharmacy. 3.  He will otherwise continue with conservative measures as previously discussed. 4.  I will call with the CT report when it reaches my inbox.

## 2020-11-04 ENCOUNTER — TELEPHONE (OUTPATIENT)
Dept: SURGERY | Age: 50
End: 2020-11-04

## 2020-11-05 ENCOUNTER — HOSPITAL ENCOUNTER (OUTPATIENT)
Dept: CT IMAGING | Age: 50
Discharge: HOME | End: 2020-11-05
Payer: COMMERCIAL

## 2020-11-05 PROCEDURE — 6360000004 HC RX CONTRAST MEDICATION: Performed by: SURGERY

## 2020-11-05 PROCEDURE — 72193 CT PELVIS W/DYE: CPT

## 2020-11-05 RX ADMIN — IOPAMIDOL 90 ML: 755 INJECTION, SOLUTION INTRAVENOUS at 15:03

## 2020-11-06 ENCOUNTER — TELEPHONE (OUTPATIENT)
Dept: SURGERY | Age: 50
End: 2020-11-06

## 2020-11-06 NOTE — TELEPHONE ENCOUNTER
Patient called requesting the results of his CT. Please call the patient and advise.   Thank you  [phone]  CC Levora Kehr   [facility]

## 2020-11-06 NOTE — TELEPHONE ENCOUNTER
Spoke with patient and told him Dr. Katherine Prince not in the office today but will be here on Monday and as soon as he see's the report he will be contacting him with the results.

## 2020-11-09 ENCOUNTER — TELEPHONE (OUTPATIENT)
Dept: SURGERY | Age: 50
End: 2020-11-09

## 2020-11-09 ENCOUNTER — TELEPHONE (OUTPATIENT)
Dept: INTERNAL MEDICINE | Age: 50
End: 2020-11-09

## 2020-11-09 NOTE — TELEPHONE ENCOUNTER
Pt has talked to surgeons office and  was out on Friday and is in surg today. Will be in office tomorrow.

## 2020-11-09 NOTE — TELEPHONE ENCOUNTER
Pt called saying he can not get a return call from surgeons office. Will you get him results from ct scan. Says he is misserable? ??

## 2020-11-10 ENCOUNTER — OFFICE VISIT (OUTPATIENT)
Dept: INTERNAL MEDICINE | Age: 50
End: 2020-11-10
Payer: COMMERCIAL

## 2020-11-10 VITALS
HEART RATE: 80 BPM | DIASTOLIC BLOOD PRESSURE: 90 MMHG | BODY MASS INDEX: 35.54 KG/M2 | SYSTOLIC BLOOD PRESSURE: 144 MMHG | OXYGEN SATURATION: 98 % | WEIGHT: 292 LBS

## 2020-11-10 PROCEDURE — 99213 OFFICE O/P EST LOW 20 MIN: CPT | Performed by: INTERNAL MEDICINE

## 2020-11-10 RX ORDER — OXYCODONE HYDROCHLORIDE AND ACETAMINOPHEN 5; 325 MG/1; MG/1
1 TABLET ORAL EVERY 4 HOURS PRN
COMMUNITY
End: 2021-03-31 | Stop reason: ALTCHOICE

## 2020-11-10 ASSESSMENT — ENCOUNTER SYMPTOMS
BLOOD IN STOOL: 0
EYE DISCHARGE: 0
BACK PAIN: 0
ABDOMINAL PAIN: 0
NAUSEA: 0
SORE THROAT: 0
EYE ITCHING: 0
SINUS PRESSURE: 0
TROUBLE SWALLOWING: 0
WHEEZING: 0
VOMITING: 0
ABDOMINAL DISTENTION: 0
SHORTNESS OF BREATH: 0

## 2020-11-10 NOTE — PROGRESS NOTES
 Heart Disease Mother     Diabetes Mother     Heart Attack Father     Breast Cancer Maternal Aunt     Cancer Paternal Aunt        Social History     Tobacco Use    Smoking status: Never Smoker    Smokeless tobacco: Former User     Types: Chew   Substance Use Topics    Alcohol use: No      Current Outpatient Medications   Medication Sig Dispense Refill    oxyCODONE-acetaminophen (PERCOCET) 5-325 MG per tablet Take 1 tablet by mouth every 4 hours as needed for Pain.  ibuprofen (ADVIL;MOTRIN) 800 MG tablet Take 1 tablet by mouth 3 times daily (with meals) 90 tablet 1    ondansetron (ZOFRAN) 4 MG tablet Take 1 tablet by mouth 3 times daily as needed for Nausea or Vomiting 30 tablet 0    rizatriptan (MAXALT-MLT) 10 MG disintegrating tablet Take 1 tablet by mouth once as needed for Migraine May repeat in 2 hours if needed 30 tablet 3    losartan (COZAAR) 25 MG tablet Take 1 tablet by mouth daily 30 tablet 5    levothyroxine (SYNTHROID) 100 MCG tablet Take 1 tablet by mouth Daily 30 tablet 5    glyBURIDE (DIABETA) 5 MG tablet TAKE 1 TABLET BY MOUTH DAILY (WITH BREAKFAST) 30 tablet 3    amitriptyline (ELAVIL) 25 MG tablet TAKE 1 TABLET BY MOUTH NIGHTLY 30 tablet 2     No current facility-administered medications for this visit. No Known Allergies      Subjective:     Review of Systems   Constitutional: Negative for activity change, appetite change, fatigue and fever. HENT: Negative for congestion, hearing loss, sinus pressure, sore throat and trouble swallowing. Eyes: Negative for discharge and itching. Respiratory: Negative for shortness of breath and wheezing. Cardiovascular: Negative for chest pain, palpitations and leg swelling. Gastrointestinal: Negative for abdominal distention, abdominal pain, blood in stool, nausea and vomiting. Endocrine: Negative for cold intolerance, heat intolerance and polydipsia.    Genitourinary: Negative for flank pain, frequency, hematuria and urgency. Musculoskeletal: Negative for arthralgias, back pain and joint swelling. R Sided groin pain   Skin: Negative for rash and wound. Allergic/Immunologic: Negative for environmental allergies and food allergies. Neurological: Negative for dizziness, tremors, syncope, weakness, numbness and headaches. Hematological: Negative for adenopathy. Psychiatric/Behavioral: Negative for agitation and hallucinations. The patient is not nervous/anxious. Objective:      BP (!) 144/90   Pulse 80   Wt 292 lb (132.5 kg)   SpO2 98%   BMI 35.54 kg/m²    Physical Exam  Constitutional:       General: He is not in acute distress. Appearance: He is well-developed. He is not diaphoretic. HENT:      Head: Normocephalic and atraumatic. Right Ear: External ear normal.      Left Ear: External ear normal.      Nose: Nose normal.      Mouth/Throat:      Pharynx: No oropharyngeal exudate. Eyes:      General: No scleral icterus. Right eye: No discharge. Left eye: No discharge. Conjunctiva/sclera: Conjunctivae normal.      Pupils: Pupils are equal, round, and reactive to light. Neck:      Musculoskeletal: Normal range of motion and neck supple. Thyroid: No thyromegaly. Vascular: No JVD. Trachea: No tracheal deviation. Cardiovascular:      Rate and Rhythm: Normal rate and regular rhythm. Heart sounds: Normal heart sounds. No murmur. No friction rub. No gallop. Pulmonary:      Effort: Pulmonary effort is normal. No respiratory distress. Breath sounds: Normal breath sounds. No wheezing or rales. Abdominal:      General: Bowel sounds are normal. There is no distension. Palpations: Abdomen is soft. There is no mass. Tenderness: There is abdominal tenderness. There is no guarding or rebound. Hernia: No hernia is present. Musculoskeletal: Normal range of motion. General: No tenderness or deformity.    Lymphadenopathy:      Cervical: No cervical adenopathy. Skin:     General: Skin is warm and dry. Coloration: Skin is not pale. Findings: No erythema or rash. Neurological:      Mental Status: He is alert and oriented to person, place, and time. Cranial Nerves: No cranial nerve deficit. Motor: No abnormal muscle tone. Coordination: Coordination normal.      Deep Tendon Reflexes: Reflexes are normal and symmetric. Reflexes normal.   Psychiatric:         Behavior: Behavior normal.         Thought Content: Thought content normal.         Judgment: Judgment normal.          Assessment:      Diagnosis Orders   1. Right inguinal pain  External Referral To General Surgery            Plan:     The inguinal pain which is persistent with no evidence of hernia on CT scan. He wants a surgical opinion for second opinion were going to send him over to [facility] to the surgical group. He also needs referral to pain management to see if a possible nerve block would help. There is been no other changes at this time and I will continue as he is doing otherwise. He is to keep his scheduled appointment. No follow-ups on file. Patient given educational materials- see patient instructions. Discussed use, benefit, and side effects of prescribedmedications. All patient questions answered. Pt voiced understanding. Reviewedhealth maintenance. Instructed to continue current medications, diet and exercise. Patient agreed with treatment plan. **This report has been created usingvoice recognition software. It may contain minor errors which are inherent in voicerecognition technology. **    Electronically signed by Mode Galicia MD on 11/10/2020 at 1:11 PM

## 2020-12-01 PROCEDURE — 87635 SARS-COV-2 COVID-19 AMP PRB: CPT | Performed by: FAMILY MEDICINE

## 2020-12-08 RX ORDER — GLYBURIDE 5 MG/1
5 TABLET ORAL
Qty: 30 TABLET | Refills: 3 | Status: SHIPPED | OUTPATIENT
Start: 2020-12-08 | End: 2021-02-26

## 2020-12-08 NOTE — TELEPHONE ENCOUNTER
[facility] called requesting a refill of the below medication which has been pended for you:     Requested Prescriptions     Pending Prescriptions Disp Refills    glyBURIDE (DIABETA) 5 MG tablet [Pharmacy Med Name: GLYBURIDE 5 MG TABLET 5 Tablet] 30 tablet 3     Sig: TAKE 1 TABLET BY MOUTH DAILY (WITH BREAKFAST)       Last Appointment Date: 11/10/2020  Next Appointment Date: 4/15/2021    No Known Allergies

## 2021-02-15 ENCOUNTER — VIRTUAL VISIT (OUTPATIENT)
Dept: INTERNAL MEDICINE | Age: 51
End: 2021-02-15
Payer: COMMERCIAL

## 2021-02-15 DIAGNOSIS — R50.9 FEVER, UNSPECIFIED FEVER CAUSE: Primary | ICD-10-CM

## 2021-02-15 DIAGNOSIS — J20.8 ACUTE BRONCHITIS DUE TO OTHER SPECIFIED ORGANISMS: ICD-10-CM

## 2021-02-15 DIAGNOSIS — R50.9 FEVER, UNSPECIFIED FEVER CAUSE: ICD-10-CM

## 2021-02-15 PROCEDURE — 99214 OFFICE O/P EST MOD 30 MIN: CPT | Performed by: INTERNAL MEDICINE

## 2021-02-15 RX ORDER — BENZONATATE 200 MG/1
200 CAPSULE ORAL 3 TIMES DAILY PRN
Qty: 30 CAPSULE | Refills: 0 | Status: SHIPPED | OUTPATIENT
Start: 2021-02-15 | End: 2021-02-15 | Stop reason: SDUPTHER

## 2021-02-15 RX ORDER — METHYLPREDNISOLONE 4 MG/1
TABLET ORAL
Qty: 1 KIT | Refills: 0 | Status: SHIPPED | OUTPATIENT
Start: 2021-02-15 | End: 2021-02-21

## 2021-02-15 RX ORDER — AZITHROMYCIN 250 MG/1
250 TABLET, FILM COATED ORAL SEE ADMIN INSTRUCTIONS
Qty: 6 TABLET | Refills: 0 | Status: SHIPPED | OUTPATIENT
Start: 2021-02-15 | End: 2021-02-15 | Stop reason: SDUPTHER

## 2021-02-15 RX ORDER — BENZONATATE 200 MG/1
200 CAPSULE ORAL 3 TIMES DAILY PRN
Qty: 30 CAPSULE | Refills: 0 | Status: SHIPPED | OUTPATIENT
Start: 2021-02-15 | End: 2021-02-25

## 2021-02-15 RX ORDER — AZITHROMYCIN 250 MG/1
250 TABLET, FILM COATED ORAL SEE ADMIN INSTRUCTIONS
Qty: 6 TABLET | Refills: 0 | Status: SHIPPED | OUTPATIENT
Start: 2021-02-15 | End: 2021-02-20

## 2021-02-15 RX ORDER — METHYLPREDNISOLONE 4 MG/1
TABLET ORAL
Qty: 1 KIT | Refills: 0 | Status: SHIPPED | OUTPATIENT
Start: 2021-02-15 | End: 2021-02-15 | Stop reason: SDUPTHER

## 2021-02-15 ASSESSMENT — ENCOUNTER SYMPTOMS
SHORTNESS OF BREATH: 1
ABDOMINAL PAIN: 0
COUGH: 1
SORE THROAT: 0
TROUBLE SWALLOWING: 0
VOMITING: 0
BACK PAIN: 0
EYE DISCHARGE: 0
WHEEZING: 1
NAUSEA: 0
BLOOD IN STOOL: 0
EYE ITCHING: 0
SINUS PRESSURE: 0
ABDOMINAL DISTENTION: 0

## 2021-02-15 NOTE — PROGRESS NOTES
2/15/2021    TELEHEALTH EVALUATION -- Audio/Visual (During [facility]-18 public health emergency)    HPI:    Katlyn Norton (:  1970) has requested an audio/video evaluation for the following concern(s):    Eugene Gonzalez called in for video visit today. He is sick with fever chills aches all over. He has been sick for the last 24 hours. 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 He looks like he feels terrible today. Review of Systems   Constitutional: Positive for fatigue and fever. Negative for activity change and appetite change. HENT: Negative for congestion, hearing loss, sinus pressure, sore throat and trouble swallowing. Eyes: Negative for discharge and itching. Respiratory: Positive for cough, shortness of breath and wheezing. Cardiovascular: Negative for chest pain, palpitations and leg swelling. Gastrointestinal: Negative for abdominal distention, abdominal pain, blood in stool, nausea and vomiting. Endocrine: Negative for cold intolerance, heat intolerance and polydipsia. Genitourinary: Negative for flank pain, frequency, hematuria and urgency. Musculoskeletal: Positive for myalgias. Negative for arthralgias, back pain and joint swelling. Skin: Negative for rash and wound. Allergic/Immunologic: Negative for environmental allergies and food allergies. Neurological: Negative for dizziness, tremors, syncope, weakness, numbness and headaches. Hematological: Negative for adenopathy. Psychiatric/Behavioral: Negative for agitation and hallucinations. The patient is not nervous/anxious. Prior to Visit Medications    Medication Sig Taking?  Authorizing Provider   azithromycin (ZITHROMAX) 250 MG tablet Take 1 tablet by mouth See Admin Instructions for 5 days 500mg on day 1 followed by 250mg on days 2 - 5 Yes Yamilex Donovan MD methylPREDNISolone (MEDROL DOSEPACK) 4 MG tablet Take by mouth. Yes Aiden Gagnon MD   benzonatate (TESSALON) 200 MG capsule Take 1 capsule by mouth 3 times daily as needed for Cough Yes Aiden Gagnon MD   glyBURIDE (DIABETA) 5 MG tablet TAKE 1 TABLET BY MOUTH DAILY (WITH BREAKFAST)  Aiden Gagnon MD   oxyCODONE-acetaminophen (PERCOCET) 5-325 MG per tablet Take 1 tablet by mouth every 4 hours as needed for Pain. Historical Provider, MD   ibuprofen (ADVIL;MOTRIN) 800 MG tablet Take 1 tablet by mouth 3 times daily (with meals)  Jovana Cheung MD   ondansetron (ZOFRAN) 4 MG tablet Take 1 tablet by mouth 3 times daily as needed for Nausea or Vomiting  Aiden Gagnon MD   rizatriptan (MAXALT-MLT) 10 MG disintegrating tablet Take 1 tablet by mouth once as needed for Migraine May repeat in 2 hours if needed  Aiden Gagnon MD   losartan (COZAAR) 25 MG tablet Take 1 tablet by mouth daily  Aiden Gagnon MD   levothyroxine (SYNTHROID) 100 MCG tablet Take 1 tablet by mouth Daily  Aiden Gagnon MD   amitriptyline (ELAVIL) 25 MG tablet TAKE 1 TABLET BY MOUTH NIGHTLY  Aiden Gagnon MD       Social History     Tobacco Use    Smoking status: Never Smoker    Smokeless tobacco: Former User     Types: Chew   Substance Use Topics    Alcohol use: No    Drug use:  No            PHYSICAL EXAMINATION:  [ INSTRUCTIONS:  \"[x]\" Indicates a positive item  \"[]\" Indicates a negative item  -- DELETE ALL ITEMS NOT EXAMINED]  Vital Signs: (As obtained by patient/caregiver or practitioner observation)    Blood pressure-  Heart rate-    Respiratory rate-    Temperature-  Pulse oximetry-     Constitutional: [] Appears well-developed and well-nourished [] No apparent distress      [x] Abnorma appears acutely ill   mental status  [x] Alert and awake  [x] Oriented to person/place/time [x]Able to follow commands      Eyes:  EOM    [x]  Normal  [] Abnormal-  Sclera  [x]  Normal  [] Abnormal - Discharge []  None visible  [] Abnormal -    HENT:   [x] Normocephalic, atraumatic. [] Abnormal   [x] Mouth/Throat: Mucous membranes are moist.     External Ears [x] Normal  [] Abnormal-     Neck: [x] No visualized mass     Pulmonary/Chest: [] Respiratory effort normal.  [] No visualized signs of difficulty breathing or respiratory distress        [] Abnormal-coughing with a large amount of rhonchi and wheezing that is audible  Musculoskeletal:   [x] Normal gait with no signs of ataxia         [x] Normal range of motion of neck        [] Abnormal-       Neurological:        [x] No Facial Asymmetry (Cranial nerve 7 motor function) (limited exam to video visit)          [x] No gaze palsy        [] Abnormal-         Skin:        [x] No significant exanthematous lesions or discoloration noted on facial skin         [] Abnormal-            Psychiatric:       [x] Normal Affect [] No Hallucinations        [] Abnormal-     Other pertinent observable physical exam findings-     ASSESSMENT/PLAN:  1. Fever, unspecified fever cause    - azithromycin (ZITHROMAX) 250 MG tablet; Take 1 tablet by mouth See Admin Instructions for 5 days 500mg on day 1 followed by 250mg on days 2 - 5  Dispense: 6 tablet; Refill: 0    2.  Acute bronchitis due to other specified organisms Fever and a bronchitis with most likely related to positive Covid infection. 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 He came home yesterday feeling bad and today is much worse. He looks acutely ill and is quite congested. He is lost his sense of taste and smell and I suspect he is probably COVID-19 positive. I will send in erythromycin and a Medrol Dosepak and also some Tessalon Perles. I told him to push fluids use Tylenol and ibuprofen for fever and malaise and myalgias. If he gets worse he needs to go to the ER. And I told him he probably needs to go get tested for Covid. He states that he will do so. No follow-ups on file. Lourdes Masterson. is a 48 y.o. male being evaluated by a Virtual Visit (video visit) encounter to address concerns as mentioned above. A caregiver was present when appropriate. Due to this being a TeleHealth encounter ([facility]), evaluation of the following organ systems was limited: Vitals/Constitutional/EENT/Resp/CV/GI//MS/Neuro/Skin/Heme-Lymph-Imm. Pursuant to the emergency declaration under the [address] [place] [address], [address] [place] [address] and the [employer] Act, this Virtual Visit was conducted with patient's (and/or legal guardian's) consent, to reduce the patient's risk of exposure to COVID-19 and provide necessary medical care. The patient (and/or legal guardian) has also been advised to contact this office for worsening conditions or problems, and seek emergency medical treatment and/or call 911 if deemed necessary.      Patient identification was verified at the start of the visit: Yes    Total time spent on this encounter: Not billed by time Services were provided through a video synchronous discussion virtually to substitute for in-person clinic visit. Patient and provider were located at their individual homes. --Ernie Mathews MD on 2/15/2021 at 8:40 AM    An electronic signature was used to authenticate this note.

## 2021-02-20 ENCOUNTER — APPOINTMENT (OUTPATIENT)
Dept: GENERAL RADIOLOGY | Facility: HOSPITAL | Age: 51
End: 2021-02-20

## 2021-02-20 ENCOUNTER — HOSPITAL ENCOUNTER (EMERGENCY)
Facility: HOSPITAL | Age: 51
Discharge: HOME | End: 2021-02-20
Admitting: EMERGENCY MEDICINE

## 2021-02-20 VITALS
TEMPERATURE: 98.9 F | BODY MASS INDEX: 35.31 KG/M2 | SYSTOLIC BLOOD PRESSURE: 117 MMHG | OXYGEN SATURATION: 96 % | HEART RATE: 88 BPM | RESPIRATION RATE: 20 BRPM | HEIGHT: 76 IN | WEIGHT: 290 LBS | DIASTOLIC BLOOD PRESSURE: 68 MMHG

## 2021-02-20 DIAGNOSIS — U07.1 COVID-19: Primary | ICD-10-CM

## 2021-02-20 LAB
ALBUMIN SERPL-MCNC: 3.9 G/DL (ref 3.5–5.2)
ALBUMIN/GLOB SERPL: 1.1 G/DL
ALP SERPL-CCNC: 71 U/L (ref 39–117)
ALT SERPL W P-5'-P-CCNC: 24 U/L (ref 1–41)
ANION GAP SERPL CALCULATED.3IONS-SCNC: 12 MMOL/L (ref 5–15)
APTT PPP: 32.9 SECONDS (ref 24.1–35)
AST SERPL-CCNC: 21 U/L (ref 1–40)
BASOPHILS # BLD AUTO: 0.03 10*3/MM3 (ref 0–0.2)
BASOPHILS NFR BLD AUTO: 0.4 % (ref 0–1.5)
BILIRUB SERPL-MCNC: 0.5 MG/DL (ref 0–1.2)
BUN SERPL-MCNC: 24 MG/DL (ref 6–20)
BUN/CREAT SERPL: 38.7 (ref 7–25)
CALCIUM SPEC-SCNC: 9 MG/DL (ref 8.6–10.5)
CHLORIDE SERPL-SCNC: 99 MMOL/L (ref 98–107)
CO2 SERPL-SCNC: 26 MMOL/L (ref 22–29)
CREAT SERPL-MCNC: 0.62 MG/DL (ref 0.76–1.27)
D DIMER PPP FEU-MCNC: <0.22 MG/L (FEU) (ref 0–0.5)
DEPRECATED RDW RBC AUTO: 39.3 FL (ref 37–54)
EOSINOPHIL # BLD AUTO: 0.04 10*3/MM3 (ref 0–0.4)
EOSINOPHIL NFR BLD AUTO: 0.5 % (ref 0.3–6.2)
ERYTHROCYTE [DISTWIDTH] IN BLOOD BY AUTOMATED COUNT: 13.8 % (ref 12.3–15.4)
GFR SERPL CREATININE-BSD FRML MDRD: 137 ML/MIN/1.73
GLOBULIN UR ELPH-MCNC: 3.4 GM/DL
GLUCOSE SERPL-MCNC: 138 MG/DL (ref 65–99)
HCT VFR BLD AUTO: 41.5 % (ref 37.5–51)
HGB BLD-MCNC: 14.2 G/DL (ref 13–17.7)
HOLD SPECIMEN: NORMAL
IMM GRANULOCYTES # BLD AUTO: 0.13 10*3/MM3 (ref 0–0.05)
IMM GRANULOCYTES NFR BLD AUTO: 1.7 % (ref 0–0.5)
INR PPP: 1.05 (ref 0.91–1.09)
LYMPHOCYTES # BLD AUTO: 1.75 10*3/MM3 (ref 0.7–3.1)
LYMPHOCYTES NFR BLD AUTO: 22.6 % (ref 19.6–45.3)
MCH RBC QN AUTO: 26.8 PG (ref 26.6–33)
MCHC RBC AUTO-ENTMCNC: 34.2 G/DL (ref 31.5–35.7)
MCV RBC AUTO: 78.3 FL (ref 79–97)
MONOCYTES # BLD AUTO: 0.61 10*3/MM3 (ref 0.1–0.9)
MONOCYTES NFR BLD AUTO: 7.9 % (ref 5–12)
NEUTROPHILS NFR BLD AUTO: 5.19 10*3/MM3 (ref 1.7–7)
NEUTROPHILS NFR BLD AUTO: 66.9 % (ref 42.7–76)
NRBC BLD AUTO-RTO: 0 /100 WBC (ref 0–0.2)
PLATELET # BLD AUTO: 254 10*3/MM3 (ref 140–450)
PMV BLD AUTO: 8.7 FL (ref 6–12)
POTASSIUM SERPL-SCNC: 3.7 MMOL/L (ref 3.5–5.2)
PROT SERPL-MCNC: 7.3 G/DL (ref 6–8.5)
PROTHROMBIN TIME: 13.3 SECONDS (ref 11.9–14.6)
RBC # BLD AUTO: 5.3 10*6/MM3 (ref 4.14–5.8)
SODIUM SERPL-SCNC: 137 MMOL/L (ref 136–145)
TROPONIN T SERPL-MCNC: <0.01 NG/ML (ref 0–0.03)
WBC # BLD AUTO: 7.75 10*3/MM3 (ref 3.4–10.8)
WHOLE BLOOD HOLD SPECIMEN: NORMAL
WHOLE BLOOD HOLD SPECIMEN: NORMAL

## 2021-02-20 PROCEDURE — 85025 COMPLETE CBC W/AUTO DIFF WBC: CPT | Performed by: NURSE PRACTITIONER

## 2021-02-20 PROCEDURE — 85730 THROMBOPLASTIN TIME PARTIAL: CPT | Performed by: NURSE PRACTITIONER

## 2021-02-20 PROCEDURE — 99284 EMERGENCY DEPT VISIT MOD MDM: CPT

## 2021-02-20 PROCEDURE — 93010 ELECTROCARDIOGRAM REPORT: CPT | Performed by: EMERGENCY MEDICINE

## 2021-02-20 PROCEDURE — 80053 COMPREHEN METABOLIC PANEL: CPT | Performed by: NURSE PRACTITIONER

## 2021-02-20 PROCEDURE — 25010000006 BAMLANIVIMAB 700 MG/20ML SOLUTION 20 ML VIAL: Performed by: NURSE PRACTITIONER

## 2021-02-20 PROCEDURE — 93005 ELECTROCARDIOGRAM TRACING: CPT

## 2021-02-20 PROCEDURE — 71045 X-RAY EXAM CHEST 1 VIEW: CPT

## 2021-02-20 PROCEDURE — M0239 BAMLANIVIMAB-XXXX INFUSION: HCPCS | Performed by: NURSE PRACTITIONER

## 2021-02-20 PROCEDURE — 85610 PROTHROMBIN TIME: CPT | Performed by: NURSE PRACTITIONER

## 2021-02-20 PROCEDURE — 84484 ASSAY OF TROPONIN QUANT: CPT | Performed by: NURSE PRACTITIONER

## 2021-02-20 PROCEDURE — 63710000001 ONDANSETRON ODT 4 MG TABLET DISPERSIBLE: Performed by: NURSE PRACTITIONER

## 2021-02-20 PROCEDURE — 85379 FIBRIN DEGRADATION QUANT: CPT | Performed by: NURSE PRACTITIONER

## 2021-02-20 RX ORDER — HYDROCODONE BITARTRATE AND ACETAMINOPHEN 7.5; 325 MG/1; MG/1
1 TABLET ORAL ONCE
Status: COMPLETED | OUTPATIENT
Start: 2021-02-20 | End: 2021-02-20

## 2021-02-20 RX ORDER — DIPHENHYDRAMINE HYDROCHLORIDE 50 MG/ML
50 INJECTION INTRAMUSCULAR; INTRAVENOUS ONCE AS NEEDED
Status: DISCONTINUED | OUTPATIENT
Start: 2021-02-20 | End: 2021-02-21 | Stop reason: HOSPADM

## 2021-02-20 RX ORDER — METHYLPREDNISOLONE SODIUM SUCCINATE 125 MG/2ML
125 INJECTION, POWDER, LYOPHILIZED, FOR SOLUTION INTRAMUSCULAR; INTRAVENOUS ONCE AS NEEDED
Status: DISCONTINUED | OUTPATIENT
Start: 2021-02-20 | End: 2021-02-21 | Stop reason: HOSPADM

## 2021-02-20 RX ORDER — BENZONATATE 200 MG/1
200 CAPSULE ORAL 3 TIMES DAILY PRN
COMMUNITY

## 2021-02-20 RX ORDER — ALBUTEROL SULFATE 2.5 MG/3ML
2.5 SOLUTION RESPIRATORY (INHALATION) EVERY 4 HOURS PRN
Qty: 25 EACH | Refills: 0 | Status: SHIPPED | OUTPATIENT
Start: 2021-02-20

## 2021-02-20 RX ORDER — SODIUM CHLORIDE 9 MG/ML
30 INJECTION, SOLUTION INTRAVENOUS ONCE
Status: DISCONTINUED | OUTPATIENT
Start: 2021-02-20 | End: 2021-02-21 | Stop reason: HOSPADM

## 2021-02-20 RX ORDER — ONDANSETRON 4 MG/1
4 TABLET, ORALLY DISINTEGRATING ORAL ONCE
Status: COMPLETED | OUTPATIENT
Start: 2021-02-20 | End: 2021-02-20

## 2021-02-20 RX ORDER — EPINEPHRINE 0.3 MG/.3ML
0.3 INJECTION SUBCUTANEOUS ONCE AS NEEDED
Status: DISCONTINUED | OUTPATIENT
Start: 2021-02-20 | End: 2021-02-21 | Stop reason: HOSPADM

## 2021-02-20 RX ADMIN — SODIUM CHLORIDE 700 MG: 9 INJECTION, SOLUTION INTRAVENOUS at 20:20

## 2021-02-20 RX ADMIN — HYDROCODONE BITARTRATE AND ACETAMINOPHEN 1 TABLET: 7.5; 325 TABLET ORAL at 20:22

## 2021-02-20 RX ADMIN — ONDANSETRON 4 MG: 4 TABLET, ORALLY DISINTEGRATING ORAL at 20:22

## 2021-02-20 RX ADMIN — SODIUM CHLORIDE 500 ML: 9 INJECTION, SOLUTION INTRAVENOUS at 17:57

## 2021-02-20 NOTE — ED PROVIDER NOTES
Subjective   Patient is a 50-year-old male presents emergency department with chief complaints of shortness of breath secondary to Covid.  Patient states he began having sinus type symptoms 1 week ago today.  He works for the [employer] and states that the nurse swabbed him and he tested positive for Covid last Sunday.  Patient has had cough with congestion, generalized weakness, change in taste and smell, fever with chills, diarrhea, body aches, and headache.  He has been taking azithromycin as well as a Medrol Dosepak.  He additionally has been taking over-the-counter vitamin C and zinc.  Patient states today began feeling short of breath and describes having trouble breathing therefore he came to emergency department for evaluation and treatment.          Review of Systems   Constitutional: Positive for chills and fever.   HENT: Positive for congestion.    Eyes: Negative.    Respiratory: Positive for cough and shortness of breath.    Cardiovascular: Negative for chest pain.   Gastrointestinal: Positive for diarrhea and nausea. Negative for abdominal pain and vomiting.   Genitourinary: Negative.    Musculoskeletal: Positive for myalgias.   Skin: Negative.    Neurological: Positive for headaches.   All other systems reviewed and are negative.      Past Medical History:   Diagnosis Date   • Acute pansinusitis 5/19/2017   • Goiter    • Hypothyroidism        No Known Allergies    Past Surgical History:   Procedure Laterality Date   • CHOLECYSTECTOMY     • HERNIA REPAIR     • KNEE SURGERY     • THYROIDECTOMY Left        Family History   Problem Relation Age of Onset   • Diabetes Mother    • Heart disease Father    • Cancer Maternal Aunt    • Cancer Maternal Uncle    • Cancer Paternal Aunt        Social History     Socioeconomic History   • Marital status:      Spouse name: Not on file   • Number of children: Not on file   • Years of education: Not on file   • Highest education level: Not on file    Tobacco Use   • Smoking status: Never Smoker   • Smokeless tobacco: Never Used   Substance and Sexual Activity   • Alcohol use: No   • Drug use: No   • Sexual activity: Defer           Objective   Physical Exam  Vitals signs and nursing note reviewed.   Constitutional:       General: He is not in acute distress.     Appearance: He is well-developed. He is not diaphoretic.   HENT:      Head: Normocephalic and atraumatic.      Nose: Nose normal.      Mouth/Throat:      Mouth: Mucous membranes are moist.   Eyes:      General: No scleral icterus.     Conjunctiva/sclera: Conjunctivae normal.      Pupils: Pupils are equal, round, and reactive to light.   Neck:      Musculoskeletal: Normal range of motion and neck supple.      Thyroid: No thyromegaly.      Vascular: No JVD.   Cardiovascular:      Rate and Rhythm: Normal rate and regular rhythm.      Heart sounds: Normal heart sounds. No murmur.   Pulmonary:      Effort: Pulmonary effort is normal. No respiratory distress.      Breath sounds: Rales present. No wheezing.   Chest:      Chest wall: No tenderness.   Abdominal:      General: Bowel sounds are normal. There is no distension.      Palpations: Abdomen is soft. There is no mass.      Tenderness: There is no abdominal tenderness. There is no guarding or rebound.   Musculoskeletal: Normal range of motion.   Lymphadenopathy:      Cervical: No cervical adenopathy.   Skin:     General: Skin is warm and dry.      Capillary Refill: Capillary refill takes less than 2 seconds.      Coloration: Skin is not pale.      Findings: No erythema or rash.   Neurological:      General: No focal deficit present.      Mental Status: He is alert and oriented to person, place, and time.      Cranial Nerves: No cranial nerve deficit.      Coordination: Coordination normal.      Deep Tendon Reflexes: Reflexes are normal and symmetric.   Psychiatric:         Mood and Affect: Mood normal.         Behavior: Behavior normal.         Thought  Content: Thought content normal.         Judgment: Judgment normal.         Procedures           ED Course lab work-up is unremarkable.  Patient received IV Bam while in the emergency department.  We will prescribe breathing treatments and recommend close follow-up with his primary care physician.  Patient expressed understanding. Patient has been prescribed zithromax and steroids as well.                                            MDM  Number of Diagnoses or Management Options  COVID-19: new and requires workup     Amount and/or Complexity of Data Reviewed  Clinical lab tests: ordered and reviewed  Tests in the radiology section of CPT®: ordered and reviewed  Discuss the patient with other providers: yes    Risk of Complications, Morbidity, and/or Mortality  Presenting problems: moderate  Diagnostic procedures: moderate  Management options: moderate    Patient Progress  Patient progress: improved      Final diagnoses:   COVID-19            Brandi Whitfield, APRN  02/20/21 3515

## 2021-02-21 NOTE — ED NOTES
"Pt c/o \"knot in back of throat\".  bamlanivimab stopped.  Pt airway intact. Brandi EISENBERG notified.     Ran Brown RN  02/20/21 2041    "

## 2021-02-22 ENCOUNTER — PATIENT OUTREACH (OUTPATIENT)
Dept: PHARMACY | Facility: HOSPITAL | Age: 51
End: 2021-02-22

## 2021-02-22 LAB
QT INTERVAL: 370 MS
QTC INTERVAL: 442 MS

## 2021-02-22 NOTE — OUTREACH NOTE
COVID-19 Monoclonal Antibody Pharmacy Patient Outreach        Consult Details  Patient Name (): Rolando Andrade  (1970)   PCP: Kristian Monique MD   Medication: Bamlanivimab   Date of Administration: 21     Discussion:  Called patient to see check on their status post-administration. I inquired about symptoms and respiratory function (O2 saturation if they have monitor).  Patient states shortness of breath has remained the same since receiving the medication on 21.  No new-onset symptoms to report.  O2 saturations are running between 94-97%.     Summary:  I encouraged to reach out to PCP if any worsening of symptoms is noted or report to the ED.      Levi Phan, PharmD  2021 11:21 CST

## 2021-02-25 ENCOUNTER — TELEPHONE (OUTPATIENT)
Dept: INTERNAL MEDICINE | Age: 51
End: 2021-02-25

## 2021-02-25 DIAGNOSIS — J20.8 ACUTE BRONCHITIS DUE TO OTHER SPECIFIED ORGANISMS: Primary | ICD-10-CM

## 2021-02-26 ENCOUNTER — TELEPHONE (OUTPATIENT)
Dept: INTERNAL MEDICINE | Age: 51
End: 2021-02-26

## 2021-02-26 ENCOUNTER — HOSPITAL ENCOUNTER (OUTPATIENT)
Dept: GENERAL RADIOLOGY | Age: 51
Discharge: HOME | End: 2021-02-26
Payer: COMMERCIAL

## 2021-02-26 DIAGNOSIS — J20.8 ACUTE BRONCHITIS DUE TO OTHER SPECIFIED ORGANISMS: ICD-10-CM

## 2021-02-26 PROCEDURE — 71045 X-RAY EXAM CHEST 1 VIEW: CPT

## 2021-02-26 RX ORDER — RIZATRIPTAN BENZOATE 10 MG/1
TABLET, ORALLY DISINTEGRATING ORAL
COMMUNITY
Start: 2020-10-12 | End: 2021-02-26

## 2021-02-26 RX ORDER — LEVOFLOXACIN 500 MG/1
500 TABLET, FILM COATED ORAL DAILY
Qty: 10 TABLET | Refills: 0 | Status: SHIPPED | OUTPATIENT
Start: 2021-02-26 | End: 2021-03-08

## 2021-02-26 RX ORDER — GLYBURIDE 5 MG/1
5 TABLET ORAL DAILY
COMMUNITY
Start: 2020-09-24 | End: 2021-02-26

## 2021-02-26 RX ORDER — GLIPIZIDE 5 MG/1
5 TABLET ORAL DAILY
Qty: 30 TABLET | Refills: 3 | Status: SHIPPED | OUTPATIENT
Start: 2021-02-26 | End: 2022-05-11

## 2021-02-26 RX ORDER — ALBUTEROL SULFATE 2.5 MG/3ML
2.5 SOLUTION RESPIRATORY (INHALATION) EVERY 4 HOURS PRN
COMMUNITY
Start: 2021-02-20 | End: 2021-03-31 | Stop reason: ALTCHOICE

## 2021-02-27 ENCOUNTER — PATIENT OUTREACH (OUTPATIENT)
Dept: PHARMACY | Facility: HOSPITAL | Age: 51
End: 2021-02-27

## 2021-02-27 NOTE — OUTREACH NOTE
COVID-19 Monoclonal Antibody Pharmacy Patient Outreach        Consult Details  Patient Name (): Rolando Andrade  (1970)   PCP: Kristian Monique MD   Medication: Bamlanivimab 700mg   Date of Administration: 2021     Discussion:  Called patient to see check on their status post-administration. I inquired about symptoms and respiratory function (O2 saturation if they have monitor).    Patient reports having to go to [facility] and being diagnosed with pneumonia. States he still experiences shortness of breath, and his oxygen saturation levels have been running from 92-96%. He has nebulizer treatments that he continues to take four times a day.      Summary:  I encouraged to reach out to PCP if any worsening of symptoms is noted or report to the ED.      Adela Malloy, PharmD  2021 14:53 CST

## 2021-03-01 ENCOUNTER — TELEPHONE (OUTPATIENT)
Dept: INTERNAL MEDICINE | Age: 51
End: 2021-03-01

## 2021-03-01 NOTE — TELEPHONE ENCOUNTER
A repeat test now is worthless as it may be positive for up to a month after he is infected so there is no way to interpret it.

## 2021-03-08 ENCOUNTER — TELEPHONE (OUTPATIENT)
Dept: INTERNAL MEDICINE | Age: 51
End: 2021-03-08

## 2021-03-08 ENCOUNTER — HOSPITAL ENCOUNTER (OUTPATIENT)
Dept: GENERAL RADIOLOGY | Age: 51
Discharge: HOME | End: 2021-03-08
Payer: COMMERCIAL

## 2021-03-08 DIAGNOSIS — Z11.59 SCREENING FOR VIRAL DISEASE: ICD-10-CM

## 2021-03-08 DIAGNOSIS — R05.9 COUGH: ICD-10-CM

## 2021-03-08 DIAGNOSIS — R93.89 ABNORMAL X-RAY: Primary | ICD-10-CM

## 2021-03-08 DIAGNOSIS — Z11.59 SCREENING FOR VIRAL DISEASE: Primary | ICD-10-CM

## 2021-03-08 DIAGNOSIS — R05.9 COUGH: Primary | ICD-10-CM

## 2021-03-08 LAB — SARS-COV-2 ANTIBODY, TOTAL: POSITIVE

## 2021-03-08 PROCEDURE — 71046 X-RAY EXAM CHEST 2 VIEWS: CPT

## 2021-03-08 RX ORDER — TOBRAMYCIN AND DEXAMETHASONE 3; 1 MG/ML; MG/ML
SUSPENSION/ DROPS OPHTHALMIC
COMMUNITY
Start: 2021-02-10 | End: 2021-08-03 | Stop reason: ALTCHOICE

## 2021-03-08 NOTE — TELEPHONE ENCOUNTER
Patient wants to get another chest xray to check his lung, states his O2 is running 92-95, and still feels like he is winded at times

## 2021-03-08 NOTE — TELEPHONE ENCOUNTER
----- Message from Wing Jordan MD sent at 3/8/2021 12:41 PM CST -----  Radiologist wants a CT scan of his chest

## 2021-03-12 ENCOUNTER — HOSPITAL ENCOUNTER (OUTPATIENT)
Dept: CT IMAGING | Age: 51
Discharge: HOME | End: 2021-03-12
Payer: COMMERCIAL

## 2021-03-12 DIAGNOSIS — R93.89 ABNORMAL X-RAY: ICD-10-CM

## 2021-03-12 PROCEDURE — 71250 CT THORAX DX C-: CPT

## 2021-03-13 ENCOUNTER — NURSE TRIAGE (OUTPATIENT)
Dept: CALL CENTER | Facility: HOSPITAL | Age: 51
End: 2021-03-13

## 2021-03-13 NOTE — TELEPHONE ENCOUNTER
Talked at length with caller about his questions, discussed inflammatory damage to lining of lungs after covid, he also has enlarged spleen, he shared with me.  Discussed he does not need to be doing any activity that would cause a blow to his abdomen or fall.  He will continue to monitor his pulse ox right now is in the high 90's HR 92. He understands to seek treatment if pulse ox drops below 90%, HR>120, worsening SOA, sudden severe abdominal pain.  Keep follow up with doctor who will let him know if needs referral to pulmonary. He continues to quarantine from his wife a month later, she has lupus and he is afraid will make her sick.  Explained with no fever, he is outside the timeframe to need to quarantine.     Reason for Disposition  • [1] MODERATE longstanding difficulty breathing (e.g., speaks in phrases, SOB even at rest, pulse 100-120) AND [2] SAME as normal    Additional Information  • Negative: [1] Breathing stopped AND [2] hasn't returned  • Negative: Choking on something  • Negative: Severe difficulty breathing (e.g., struggling for each breath, speaks in single words)  • Negative: Bluish (or gray) lips or face now  • Negative: Difficult to awaken or acting confused (e.g., disoriented, slurred speech)  • Negative: Passed out (i.e., lost consciousness, collapsed and was not responding)  • Negative: Wheezing started suddenly after medicine, an allergic food or bee sting  • Negative: Stridor  • Negative: Slow, shallow and weak breathing  • Negative: Sounds like a life-threatening emergency to the triager  • Negative: Chest pain  • Negative: [1] Wheezing (high pitched whistling sound) AND [2] previous asthma attacks or use of asthma medicines  • Negative: [1] Difficulty breathing AND [2] only present when coughing  • Negative: [1] Difficulty breathing AND [2] only from stuffy or runny nose  • Negative: [1] Difficulty breathing AND [2] within 14 days of COVID-19 Exposure  • Negative: [1] MODERATE difficulty  "breathing (e.g., speaks in phrases, SOB even at rest, pulse 100-120) AND [2] NEW-onset or WORSE than normal  • Negative: Wheezing can be heard across the room  • Negative: Drooling or spitting out saliva (because can't swallow)  • Negative: History of prior \"blood clot\" in leg or lungs (i.e., deep vein thrombosis, pulmonary embolism)  • Negative: History of inherited increased risk of blood clots (e.g., Factor 5 Leiden, Anti-thrombin 3, Protein C or Protein S deficiency, Prothrombin mutation)  • Negative: Major surgery in the past month  • Negative: Hip or leg fracture (broken bone) in past month (or had cast on leg or ankle in past month)  • Negative: Illness requiring prolonged bedrest in past month (e.g., immobilization, long hospital stay)  • Negative: Long-distance travel in past month (e.g., car, bus, train, plane; with trip lasting 6 or more hours)  • Negative: Extra heart beats OR irregular heart beating   (i.e., \"palpitations\")  • Negative: Fever > 103 F (39.4 C)  • Negative: [1] Fever > 101 F (38.3 C) AND [2] age > 60  • Negative: [1] Fever > 100.0 F (37.8 C) AND [2] bedridden (e.g., nursing home patient, CVA, chronic illness, recovering from surgery)  • Negative: [1] Fever > 100.0 F (37.8 C) AND [2] diabetes mellitus or weak immune system (e.g., HIV positive, cancer chemo, splenectomy, organ transplant, chronic steroids)  • Negative: [1] Periods where breathing stops and then resumes normally AND [2] bedridden (e.g., nursing home patient, CVA)  • Negative: Pregnant or postpartum (< 1 month since delivery)  • Negative: Patient sounds very sick or weak to the triager  • Negative: [1] MILD difficulty breathing (e.g., minimal/no SOB at rest, SOB with walking, pulse <100) AND [2] NEW-onset or WORSE than normal  • Negative: [1] Longstanding difficulty breathing (e.g., CHF, COPD, emphysema) AND [2] WORSE than normal  • Negative: [1] Longstanding difficulty breathing AND [2] not responding to usual therapy  • " "Negative: [1] Continuous (nonstop) coughing AND [2] keeps from working or sleeping    Answer Assessment - Initial Assessment Questions  1. RESPIRATORY STATUS: \"Describe your breathing?\" (e.g., wheezing, shortness of breath, unable to speak, severe coughing)       Has had SOA since had covid around 2/13  2. ONSET: \"When did this breathing problem begin?\"       See above  3. PATTERN \"Does the difficult breathing come and go, or has it been constant since it started?\"       Comes and goes  4. SEVERITY: \"How bad is your breathing?\" (e.g., mild, moderate, severe)     - MILD: No SOB at rest, mild SOB with walking, speaks normally in sentences, can lay down, no retractions, pulse < 100.     - MODERATE: SOB at rest, SOB with minimal exertion and prefers to sit, cannot lie down flat, speaks in phrases, mild retractions, audible wheezing, pulse 100-120.     - SEVERE: Very SOB at rest, speaks in single words, struggling to breathe, sitting hunched forward, retractions, pulse > 120       \"Just had to take a breath\" He feels short of air every day  5. RECURRENT SYMPTOM: \"Have you had difficulty breathing before?\" If so, ask: \"When was the last time?\" and \"What happened that time?\"       Since covid last month  6. CARDIAC HISTORY: \"Do you have any history of heart disease?\" (e.g., heart attack, angina, bypass surgery, angioplasty)       none  7. LUNG HISTORY: \"Do you have any history of lung disease?\"  (e.g., pulmonary embolus, asthma, emphysema)      covid last month.  Pulse ox 96% and HR 92  8. CAUSE: \"What do you think is causing the breathing problem?\"       Residual covid  9. OTHER SYMPTOMS: \"Do you have any other symptoms? (e.g., dizziness, runny nose, cough, chest pain, fever)      none  10. PREGNANCY: \"Is there any chance you are pregnant?\" \"When was your last menstrual period?\"        n/a  11. TRAVEL: \"Have you traveled out of the country in the last month?\" (e.g., travel history, exposures)       n/a    Protocols used: " BREATHING DIFFICULTY-ADULT-AH

## 2021-03-15 ENCOUNTER — TELEPHONE (OUTPATIENT)
Dept: FAMILY MEDICINE CLINIC | Age: 51
End: 2021-03-15

## 2021-03-15 ENCOUNTER — TELEPHONE (OUTPATIENT)
Dept: INTERNAL MEDICINE | Age: 51
End: 2021-03-15

## 2021-03-15 NOTE — TELEPHONE ENCOUNTER
This pt is needing to establish with you or Dr. Milli Pastor.  But he needs an appointment for tomorrow for Covid/Pneumonia that is not getting any better and he is wanting to see a lung specialist. I am going to try to put him on a vv tomorrow with you to address this at 7:45 am.

## 2021-03-15 NOTE — TELEPHONE ENCOUNTER
There is some patchy residual from the Covid pneumonia.   Nothing to treat and nothing to worry about at this point in time  It will resolve with time

## 2021-03-15 NOTE — TELEPHONE ENCOUNTER
Pt informed of results. Pt upset that had told him might take a few days to get results to him when called for results.

## 2021-03-16 ENCOUNTER — VIRTUAL VISIT (OUTPATIENT)
Dept: FAMILY MEDICINE CLINIC | Age: 51
End: 2021-03-16
Payer: COMMERCIAL

## 2021-03-16 ENCOUNTER — TELEPHONE (OUTPATIENT)
Dept: FAMILY MEDICINE CLINIC | Age: 51
End: 2021-03-16

## 2021-03-16 DIAGNOSIS — R06.02 SHORTNESS OF BREATH: ICD-10-CM

## 2021-03-16 DIAGNOSIS — U07.1 PNEUMONIA DUE TO COVID-19 VIRUS: Primary | ICD-10-CM

## 2021-03-16 DIAGNOSIS — J12.82 PNEUMONIA DUE TO COVID-19 VIRUS: Primary | ICD-10-CM

## 2021-03-16 DIAGNOSIS — E03.9 ACQUIRED HYPOTHYROIDISM: ICD-10-CM

## 2021-03-16 LAB
ALBUMIN SERPL-MCNC: 4 G/DL (ref 3.5–5.2)
ALP BLD-CCNC: 89 U/L (ref 40–130)
ALT SERPL-CCNC: 40 U/L (ref 5–41)
ANION GAP SERPL CALCULATED.3IONS-SCNC: 11 MMOL/L (ref 7–19)
AST SERPL-CCNC: 23 U/L (ref 5–40)
BASOPHILS ABSOLUTE: 0 K/UL (ref 0–0.2)
BASOPHILS RELATIVE PERCENT: 0.4 % (ref 0–1)
BILIRUB SERPL-MCNC: <0.2 MG/DL (ref 0.2–1.2)
BUN BLDV-MCNC: 20 MG/DL (ref 6–20)
CALCIUM SERPL-MCNC: 9.3 MG/DL (ref 8.6–10)
CHLORIDE BLD-SCNC: 101 MMOL/L (ref 98–111)
CO2: 26 MMOL/L (ref 22–29)
CREAT SERPL-MCNC: 0.8 MG/DL (ref 0.5–1.2)
D DIMER: <0.27 UG/ML FEU (ref 0–0.48)
EOSINOPHILS ABSOLUTE: 0.3 K/UL (ref 0–0.6)
EOSINOPHILS RELATIVE PERCENT: 3.2 % (ref 0–5)
GFR AFRICAN AMERICAN: >59
GFR NON-AFRICAN AMERICAN: >60
GLUCOSE BLD-MCNC: 199 MG/DL (ref 74–109)
HCT VFR BLD CALC: 46.8 % (ref 42–52)
HEMOGLOBIN: 14.8 G/DL (ref 14–18)
IMMATURE GRANULOCYTES #: 0.2 K/UL
LYMPHOCYTES ABSOLUTE: 2.4 K/UL (ref 1.1–4.5)
LYMPHOCYTES RELATIVE PERCENT: 29.6 % (ref 20–40)
MCH RBC QN AUTO: 27 PG (ref 27–31)
MCHC RBC AUTO-ENTMCNC: 31.6 G/DL (ref 33–37)
MCV RBC AUTO: 85.4 FL (ref 80–94)
MONOCYTES ABSOLUTE: 0.6 K/UL (ref 0–0.9)
MONOCYTES RELATIVE PERCENT: 7.5 % (ref 0–10)
NEUTROPHILS ABSOLUTE: 4.6 K/UL (ref 1.5–7.5)
NEUTROPHILS RELATIVE PERCENT: 57.4 % (ref 50–65)
PDW BLD-RTO: 14.3 % (ref 11.5–14.5)
PLATELET # BLD: 216 K/UL (ref 130–400)
PMV BLD AUTO: 9.5 FL (ref 9.4–12.4)
POTASSIUM SERPL-SCNC: 4.6 MMOL/L (ref 3.5–5)
RBC # BLD: 5.48 M/UL (ref 4.7–6.1)
SODIUM BLD-SCNC: 138 MMOL/L (ref 136–145)
TOTAL PROTEIN: 7.1 G/DL (ref 6.6–8.7)
TSH SERPL DL<=0.05 MIU/L-ACNC: 4.48 UIU/ML (ref 0.27–4.2)
WBC # BLD: 8 K/UL (ref 4.8–10.8)

## 2021-03-16 PROCEDURE — 99203 OFFICE O/P NEW LOW 30 MIN: CPT | Performed by: CLINICAL NURSE SPECIALIST

## 2021-03-16 ASSESSMENT — ENCOUNTER SYMPTOMS
DIARRHEA: 0
NAUSEA: 0
CHEST TIGHTNESS: 0
SORE THROAT: 0
WHEEZING: 0
EYE DISCHARGE: 0
CONSTIPATION: 0
COUGH: 0
TROUBLE SWALLOWING: 0
ABDOMINAL PAIN: 0
BACK PAIN: 0
VOMITING: 0
SHORTNESS OF BREATH: 1
EYE PAIN: 0
SINUS PRESSURE: 0
COLOR CHANGE: 0

## 2021-03-16 NOTE — PROGRESS NOTES
SUBJECTIVE:  Misha Rodríguez is a 48 y.o. who presents today for Shortness of Breath      HPI    VIRTUAL VISIT VIA TELEPHONE    _______________________________________________________________    Due to COVID 23 outbreak, patient's office visit was converted to telephone virtual visit. Patient was contacted and agreed to proceed with a telephone virtual visit. The risks and benefits of converting to a telephone virtual visit were discussed in light of the current infectious disease epidemic. Patient also understood that insurance coverage and co-pays are up to their individual insurance plans. Arden was evaluated today via doxy. me. He is transferring care from [facility] to [address] He was diagnosed with COVID-19 on 2/14/21. He initially had flu like symptoms of fever and body aches. The next week he developed shortness of breath which took him to ER. CXR showed ill defined opacities in bilateral mid to lower lungs. He was treated for viral pneumonia and given \"BAM\"  He continued with shortness of breath, which took him back to ER. At that time antibiotics and steroids were added and neb treatments. Last week he had + antibodies. Follow up CT chest continues to show mild patchy peripheral infiltrates in both upper and lower lobes. He is unable to walk 15 feet without becoming short of breath. He notes sats that drop to 90% at rest, rebound quickly with deep breathing. His heart rate is elevated at times. No visible swelling or fluid retention. He is using neb treatments 4x daily.     Past Medical History:   Diagnosis Date    Acid reflux     Adenomatous goiter     2013 left thyroidectomy  Dr Davonna Morales Polly Cushing Arthralgia of multiple sites     Arthritis     osteo    Chronic back pain     Effusion of patella     Gastric ulcer     Knee pain     Medial meniscus tear     Mixed hyperlipidemia     Obesity     Pain of right hand     Postoperative hypothyroidism left lobectomy, Dr Lion Leary Prediabetes     Prolonged emergence from general anesthesia     fights upon emergence; works in [employer], woke feeling he was at work and being threatened.  Simple goiter     Solitary thyroid nodule     Thyroid disease      Past Surgical History:   Procedure Laterality Date    CHOLECYSTECTOMY, LAPAROSCOPIC      HERNIA REPAIR      umbilical    INGUINAL HERNIA REPAIR Left     age 9   Chetantiffanie Leary KNEE ARTHROSCOPY Right     x2    KNEE CARTILAGE SURGERY Left 5/12/2016    ARTHROSCOPY PARTIAL MEDIAL MENISCECTOMY KNEE performed by Winston Melendez DO at [address] [place] [address]      wisdom teeth removed    THYROIDECTOMY, PARTIAL      Left thyroidectomy, Dr. Lion Hall, 2013    UPPER GASTROINTESTINAL ENDOSCOPY      [facility]       Family History   Problem Relation Age of Onset    Other Mother         hypothyroidism    Heart Disease Mother     Diabetes Mother     Heart Attack Father     Breast Cancer Maternal Aunt     Cancer Paternal Aunt      Social History     Tobacco Use    Smoking status: Never Smoker    Smokeless tobacco: Former User     Types: Chew   Substance Use Topics    Alcohol use: No     Current Outpatient Medications   Medication Sig Dispense Refill    fluticasone-salmeterol (ADVAIR DISKUS) 250-50 MCG/DOSE AEPB Inhale 1 puff into the lungs every 12 hours 60 each 3    tobramycin-dexamethasone (TOBRADEX) 0.3-0.1 % ophthalmic suspension PLACE 1 DROP INTO BOTH EYES 3 TIMES DAILY      albuterol (PROVENTIL) (2.5 MG/3ML) 0.083% nebulizer solution Inhale 2.5 mg into the lungs every 4 hours as needed      glipiZIDE (GLUCOTROL) 5 MG tablet Take 1 tablet by mouth daily 30 tablet 3    oxyCODONE-acetaminophen (PERCOCET) 5-325 MG per tablet Take 1 tablet by mouth every 4 hours as needed for Pain.       ibuprofen (ADVIL;MOTRIN) 800 MG tablet Take 1 tablet by mouth 3 times daily (with meals) 90 tablet 1    ondansetron (ZOFRAN) 4 MG tablet Take 1 tablet by mouth 3 No respiratory distress. Breath sounds: No wheezing. Neurological:      General: No focal deficit present. Mental Status: He is alert and oriented to person, place, and time. Mental status is at baseline. Psychiatric:         Mood and Affect: Mood normal.         Behavior: Behavior normal.         Thought Content: Thought content normal.         Judgment: Judgment normal.         ASSESSMENT/PLAN:  1. Pneumonia due to COVID-19 virus  Unimproved  Recommend referral to pulmonology to r/o acute on chronic lung disease  Add advair  May need PFT  - Pool Blevins MD, Pulmonary Disease, [facility]  - fluticasone-salmeterol (ADVAIR DISKUS) 250-50 MCG/DOSE AEPB; Inhale 1 puff into the lungs every 12 hours  Dispense: 60 each; Refill: 3    2. Shortness of breath  Unimproved post COVID 4 weeks ago  R/o PE, check labs  Add advair  Check ECHO  - Pool Blevins MD, Pulmonary Disease, [facility]  - ECHO Complete 2D W Doppler W Color; Future  - fluticasone-salmeterol (ADVAIR DISKUS) 250-50 MCG/DOSE AEPB; Inhale 1 puff into the lungs every 12 hours  Dispense: 60 each; Refill: 3  - D-Dimer, Quantitative; Future  - CBC Auto Differential; Future  - Comprehensive Metabolic Panel; Future  - TSH without Reflex; Future    3. Acquired hypothyroidism  - TSH without Reflex; Future          Return in about 2 weeks (around 3/30/2021).

## 2021-03-16 NOTE — TELEPHONE ENCOUNTER
I am not sure why it has to be tomorrow, looks like COVID was in February. Ideally I need a 30 minute visit with this patient since he is new to me.

## 2021-03-18 ENCOUNTER — TELEPHONE (OUTPATIENT)
Dept: FAMILY MEDICINE CLINIC | Age: 51
End: 2021-03-18

## 2021-03-18 NOTE — TELEPHONE ENCOUNTER
Double check with him to see if he is having any anxiety over all of this? Meaning if his illness could be giving him anxiety and panic at all?

## 2021-03-19 NOTE — TELEPHONE ENCOUNTER
Patient denies having any anxiety or feeling panic over his illness/symptoms. Patient states, \"I'm just anxious for this to be over. \"

## 2021-03-24 ENCOUNTER — HOSPITAL ENCOUNTER (OUTPATIENT)
Dept: NON INVASIVE DIAGNOSTICS | Age: 51
Discharge: HOME | End: 2021-03-24
Payer: COMMERCIAL

## 2021-03-24 DIAGNOSIS — R06.02 SHORTNESS OF BREATH: ICD-10-CM

## 2021-03-24 LAB
LV EF: 58 %
LVEF MODALITY: NORMAL

## 2021-03-24 PROCEDURE — 6360000004 HC RX CONTRAST MEDICATION: Performed by: INTERNAL MEDICINE

## 2021-03-24 PROCEDURE — C8929 TTE W OR WO FOL WCON,DOPPLER: HCPCS

## 2021-03-24 RX ADMIN — PERFLUTREN 1.65 MG: 6.52 INJECTION, SUSPENSION INTRAVENOUS at 11:25

## 2021-03-30 ENCOUNTER — OFFICE VISIT (OUTPATIENT)
Dept: PULMONOLOGY | Age: 51
End: 2021-03-30
Payer: COMMERCIAL

## 2021-03-30 VITALS
BODY MASS INDEX: 36.17 KG/M2 | HEART RATE: 76 BPM | HEIGHT: 76 IN | DIASTOLIC BLOOD PRESSURE: 78 MMHG | WEIGHT: 297 LBS | SYSTOLIC BLOOD PRESSURE: 130 MMHG | OXYGEN SATURATION: 98 % | TEMPERATURE: 98.3 F

## 2021-03-30 DIAGNOSIS — G47.8 NON-RESTORATIVE SLEEP: ICD-10-CM

## 2021-03-30 DIAGNOSIS — R91.8 GROUND GLASS OPACITY PRESENT ON IMAGING OF LUNG: ICD-10-CM

## 2021-03-30 DIAGNOSIS — G47.33 OBSTRUCTIVE SLEEP APNEA SYNDROME: ICD-10-CM

## 2021-03-30 DIAGNOSIS — E66.9 CLASS 2 OBESITY IN ADULT, UNSPECIFIED BMI, UNSPECIFIED OBESITY TYPE, UNSPECIFIED WHETHER SERIOUS COMORBIDITY PRESENT: ICD-10-CM

## 2021-03-30 DIAGNOSIS — G47.10 HYPERSOMNIA: ICD-10-CM

## 2021-03-30 DIAGNOSIS — R06.02 SHORT OF BREATH ON EXERTION: Primary | ICD-10-CM

## 2021-03-30 DIAGNOSIS — Z86.16 HISTORY OF COVID-19: ICD-10-CM

## 2021-03-30 PROBLEM — E66.812 CLASS 2 OBESITY IN ADULT: Status: ACTIVE | Noted: 2021-03-30

## 2021-03-30 LAB
DISTANCE WALKED: 400 FT
SPO2: 94 %

## 2021-03-30 PROCEDURE — 94618 PULMONARY STRESS TESTING: CPT | Performed by: INTERNAL MEDICINE

## 2021-03-30 PROCEDURE — 99204 OFFICE O/P NEW MOD 45 MIN: CPT | Performed by: INTERNAL MEDICINE

## 2021-03-30 ASSESSMENT — ENCOUNTER SYMPTOMS
ANAL BLEEDING: 0
RHINORRHEA: 0
ABDOMINAL PAIN: 0
APNEA: 0
CHEST TIGHTNESS: 0
SHORTNESS OF BREATH: 0
COUGH: 0
ABDOMINAL DISTENTION: 0
WHEEZING: 0
BACK PAIN: 0

## 2021-03-30 NOTE — PROGRESS NOTES
Pulmonary and Sleep Medicine     Prashant Brumfield (:  1970) is a 48 y.o. male,New patient, here for evaluation of the following chief complaint(s):  Shortness of Breath      ASSESSMENT/PLAN:  1. Short of breath on exertion  -     6 Minute Walk Test  -     Pulse oximetry, overnight; Future  2. Class 2 obesity in adult, unspecified BMI, unspecified obesity type, unspecified whether serious comorbidity present  3. History of COVID-19  4. Ground glass opacity present on imaging of lung  5. Obstructive sleep apnea syndrome  6. Non-restorative sleep  7. Hypersomnia      Shortness of breath and recent history of Covid 19 infection with residual changes consistent with Covid infection on CT of the chest.  At this time he did not desaturate with ambulation. We will check his oxygen level during sleep. Nothing specific to add to his care at this time. He does not feel that the inhalers helped his symptoms. Would consider stopping the inhalers. It is very likely that his shortness of breath is likely due to prolonged Covid symptoms. Continue supportive care. No follow-ups on file. SUBJECTIVE/OBJECTIVE:  He is here for evaluation of shortness of breath. He developed covid infection last month. He had fever and cough and shortness of breath. Did not require hospitalization. He was tested by the work nurse at home and he was positive. He had antibody test subsequent to that that was also positive. Since then he developed shortness of breath. He denies wheezing. He has been on inhalers that he does not feel they helped his symptoms. He says his symptoms have not improved since his initial Covid. He does monitor his oxygen saturations at home and his oxygen saturations are occasionally in the 80s. He did 6-minute walk today in the office and did not desaturate below 90%. The lowest oxygen saturation attained during his walk was 94%.   He did have CT of the chest that showed bilateral groundglass changes and peripheral areas of consolidation consistent with possible Covid infection. He also had an echocardiogram that was unremarkable. I was asked to see him regarding the above. He admits to snoring. He says he is witnessed to have apneas. Denies any daytime symptoms of sleepiness or fatigue. Prior to Visit Medications    Medication Sig Taking? Authorizing Provider   fluticasone-salmeterol (ADVAIR DISKUS) 250-50 MCG/DOSE AEPB Inhale 1 puff into the lungs every 12 hours Yes DENIS Jones   tobramycin-dexamethasone (TOBRADEX) 0.3-0.1 % ophthalmic suspension PLACE 1 DROP INTO BOTH EYES 3 TIMES DAILY Yes Historical Provider, MD   albuterol (PROVENTIL) (2.5 MG/3ML) 0.083% nebulizer solution Inhale 2.5 mg into the lungs every 4 hours as needed Yes Historical Provider, MD   glipiZIDE (GLUCOTROL) 5 MG tablet Take 1 tablet by mouth daily Yes Jorge L Levy MD   oxyCODONE-acetaminophen (PERCOCET) 5-325 MG per tablet Take 1 tablet by mouth every 4 hours as needed for Pain. Yes Historical Provider, MD   ibuprofen (ADVIL;MOTRIN) 800 MG tablet Take 1 tablet by mouth 3 times daily (with meals) Yes Yogesh Chris MD   ondansetron (ZOFRAN) 4 MG tablet Take 1 tablet by mouth 3 times daily as needed for Nausea or Vomiting Yes Jorge L Levy MD   losartan (COZAAR) 25 MG tablet Take 1 tablet by mouth daily Yes Jorge L Levy MD   levothyroxine (SYNTHROID) 100 MCG tablet Take 1 tablet by mouth Daily Yes Jorge L Levy MD   amitriptyline (ELAVIL) 25 MG tablet TAKE 1 TABLET BY MOUTH NIGHTLY Yes Jorge L Levy MD   rizatriptan (MAXALT-MLT) 10 MG disintegrating tablet Take 1 tablet by mouth once as needed for Migraine May repeat in 2 hours if needed  Jorge L Levy MD        Review of Systems   Constitutional: Negative for activity change, appetite change, chills, diaphoresis and fatigue. HENT: Negative for congestion, dental problem, drooling, ear discharge, postnasal drip and rhinorrhea. Eyes: Negative for visual disturbance. Respiratory: Negative for apnea, cough, chest tightness, shortness of breath and wheezing. Gastrointestinal: Negative for abdominal distention, abdominal pain and anal bleeding. Endocrine: Negative for cold intolerance, heat intolerance and polydipsia. Genitourinary: Negative for difficulty urinating, dysuria, enuresis and flank pain. Musculoskeletal: Negative for arthralgias, back pain and gait problem. Allergic/Immunologic: Negative for environmental allergies. Neurological: Negative for dizziness, facial asymmetry, light-headedness and headaches. Vitals:    03/30/21 1301   BP: 130/78   Pulse: 76   Temp: 98.3 °F (36.8 °C)   SpO2: 98%     Physical Exam  Vitals signs reviewed. Constitutional:       Appearance: Normal appearance. HENT:      Head: Normocephalic and atraumatic. Nose: Nose normal.   Eyes:      Extraocular Movements: Extraocular movements intact. Conjunctiva/sclera: Conjunctivae normal.   Neck:      Musculoskeletal: Normal range of motion and neck supple. Cardiovascular:      Rate and Rhythm: Normal rate and regular rhythm. Heart sounds: No murmur. No friction rub. Pulmonary:      Effort: Pulmonary effort is normal. No respiratory distress. Breath sounds: Normal breath sounds. No stridor. No wheezing, rhonchi or rales. Abdominal:      General: There is no distension. Palpations: There is no mass. Tenderness: There is no abdominal tenderness. There is no guarding or rebound. Neurological:      Mental Status: He is alert and oriented to person, place, and time. An electronic signature was used to authenticate this note.     --Ivette Gore MD

## 2021-03-31 ENCOUNTER — OFFICE VISIT (OUTPATIENT)
Dept: FAMILY MEDICINE CLINIC | Age: 51
End: 2021-03-31
Payer: COMMERCIAL

## 2021-03-31 VITALS
BODY MASS INDEX: 36.15 KG/M2 | OXYGEN SATURATION: 99 % | DIASTOLIC BLOOD PRESSURE: 84 MMHG | TEMPERATURE: 96.9 F | SYSTOLIC BLOOD PRESSURE: 116 MMHG | HEART RATE: 83 BPM | WEIGHT: 297 LBS

## 2021-03-31 DIAGNOSIS — E03.9 ACQUIRED HYPOTHYROIDISM: ICD-10-CM

## 2021-03-31 DIAGNOSIS — R06.02 SHORTNESS OF BREATH: Primary | ICD-10-CM

## 2021-03-31 DIAGNOSIS — E78.2 MIXED HYPERLIPIDEMIA: ICD-10-CM

## 2021-03-31 DIAGNOSIS — R53.81 PHYSICAL DECONDITIONING: ICD-10-CM

## 2021-03-31 PROCEDURE — 99213 OFFICE O/P EST LOW 20 MIN: CPT | Performed by: CLINICAL NURSE SPECIALIST

## 2021-03-31 ASSESSMENT — ENCOUNTER SYMPTOMS
EYE PAIN: 0
EYE DISCHARGE: 0
CHEST TIGHTNESS: 0
COUGH: 0
DIARRHEA: 0
SORE THROAT: 0
TROUBLE SWALLOWING: 0
ABDOMINAL PAIN: 0
CONSTIPATION: 0
SINUS PRESSURE: 0
COLOR CHANGE: 0
SHORTNESS OF BREATH: 1
NAUSEA: 0
BACK PAIN: 0
WHEEZING: 0
VOMITING: 0

## 2021-03-31 NOTE — LETTER
[address] [place] [address]  Phone: [phone]  Fax: [phone]    Rubina Jay, DENIS        March 31, 2021     Patient: Radha Palmer. YOB: 1970   Date of Visit: 3/31/2021       To Whom It May Concern: It is my medical opinion that [facility] may return to work on Monday, April 5, 2021 with the following restrictions: no restrictions . If you have any questions or concerns, please don't hesitate to call.     Sincerely,        Rubina Jay, APRN

## 2021-03-31 NOTE — PROGRESS NOTES
SUBJECTIVE:  Tiffany Gupta. is a 48 y.o. who presents today for 2 Week Follow-Up and Shortness of Breath      HPI    Mr Vitor Gonzalez presents today for follow up. He has had residual shortness of breath post COVID-19 infection and viral pneumonia in February. He did see pulmonology this week who felt most symptoms were post COVID related given presentation and work up. CT chest was reviewed. He has had 2D ECHO with very mild benign findings. He is monitoring his oxygen at home with pulse oximetry, there was some hypoxia early on but now all readings are above 92%. He still has shortness of breath with minimal exertion at times. Heart rate elevated at times as well, but the symptoms and his heart rate do not always correlate. Pulmonology is ordering overnight oximetry for him. Labs have been normal recently. He has been off work since diagnosis. We discussed return to work next week. We discussed his work duties, which are mostly sedentary and he is used to the job has been in the role for 19 years. He has co-workers who can assist him as well. Past Medical History:   Diagnosis Date    Acid reflux     Adenomatous goiter     2013 left thyroidectomy  Dr [facility] Arthralgia of multiple sites     Arthritis     osteo    Chronic back pain     Effusion of patella     Gastric ulcer     Knee pain     Medial meniscus tear     Mixed hyperlipidemia     Obesity     Pain of right hand     Postoperative hypothyroidism     left lobectomy, Dr [facility] Prediabetes     Prolonged emergence from general anesthesia     fights upon emergence; works in [employer], woke feeling he was at work and being threatened.     Simple goiter     Solitary thyroid nodule     Thyroid disease      Past Surgical History:   Procedure Laterality Date    CHOLECYSTECTOMY, LAPAROSCOPIC      HERNIA REPAIR      umbilical    INGUINAL HERNIA REPAIR Left     age 10    KNEE ARTHROSCOPY Right     x2    KNEE CARTILAGE SURGERY Left 5/12/2016    ARTHROSCOPY PARTIAL MEDIAL MENISCECTOMY KNEE performed by Leonette Castleman, DO at [facility]      wisdom teeth removed    THYROIDECTOMY, PARTIAL      Left thyroidectomy, Dr. Erica Friedman, 2013    UPPER GASTROINTESTINAL ENDOSCOPY      [facility]       Family History   Problem Relation Age of Onset    Other Mother         hypothyroidism    Heart Disease Mother     Diabetes Mother     Heart Attack Father     Breast Cancer Maternal Aunt     Cancer Paternal Aunt      Social History     Tobacco Use    Smoking status: Never Smoker    Smokeless tobacco: Former User     Types: Chew   Substance Use Topics    Alcohol use: No     Current Outpatient Medications   Medication Sig Dispense Refill    tobramycin-dexamethasone (TOBRADEX) 0.3-0.1 % ophthalmic suspension PLACE 1 DROP INTO BOTH EYES 3 TIMES DAILY      glipiZIDE (GLUCOTROL) 5 MG tablet Take 1 tablet by mouth daily 30 tablet 3    ibuprofen (ADVIL;MOTRIN) 800 MG tablet Take 1 tablet by mouth 3 times daily (with meals) 90 tablet 1    ondansetron (ZOFRAN) 4 MG tablet Take 1 tablet by mouth 3 times daily as needed for Nausea or Vomiting 30 tablet 0    losartan (COZAAR) 25 MG tablet Take 1 tablet by mouth daily 30 tablet 5    levothyroxine (SYNTHROID) 100 MCG tablet Take 1 tablet by mouth Daily 30 tablet 5    amitriptyline (ELAVIL) 25 MG tablet TAKE 1 TABLET BY MOUTH NIGHTLY 30 tablet 2    rizatriptan (MAXALT-MLT) 10 MG disintegrating tablet Take 1 tablet by mouth once as needed for Migraine May repeat in 2 hours if needed 30 tablet 3     No current facility-administered medications for this visit. No Known Allergies    Review of Systems   Constitutional: Positive for activity change and fatigue. Negative for appetite change, chills, diaphoresis and fever. HENT: Negative for congestion, ear pain, hearing loss, postnasal drip, sinus pressure, sore throat and trouble swallowing.     Eyes: Negative for pain, discharge and visual disturbance. Respiratory: Positive for shortness of breath. Negative for cough, chest tightness and wheezing. Cardiovascular: Negative for chest pain, palpitations and leg swelling. Gastrointestinal: Negative for abdominal pain, constipation, diarrhea, nausea and vomiting. Endocrine: Negative for heat intolerance. Genitourinary: Negative for difficulty urinating, dysuria, flank pain, frequency, hematuria and urgency. Musculoskeletal: Negative for arthralgias, back pain, joint swelling and neck pain. Skin: Negative for color change and rash. Neurological: Negative for dizziness, syncope, weakness, light-headedness and headaches. Psychiatric/Behavioral: Negative for behavioral problems, confusion and dysphoric mood. The patient is not nervous/anxious. OBJECTIVE:  /84   Pulse 83   Temp 96.9 °F (36.1 °C) (Temporal)   Wt 297 lb (134.7 kg)   SpO2 99%   BMI 36.15 kg/m²    Physical Exam  Vitals signs reviewed. Constitutional:       General: He is not in acute distress. Appearance: He is well-developed. He is not ill-appearing or toxic-appearing. HENT:      Head: Normocephalic and atraumatic. Eyes:      General:         Right eye: No discharge. Left eye: No discharge. Conjunctiva/sclera: Conjunctivae normal.      Pupils: Pupils are equal, round, and reactive to light. Neck:      Musculoskeletal: Normal range of motion and neck supple. Thyroid: No thyromegaly. Trachea: No tracheal deviation. Cardiovascular:      Rate and Rhythm: Normal rate and regular rhythm. Heart sounds: No murmur. Pulmonary:      Effort: Pulmonary effort is normal. No respiratory distress. Breath sounds: Normal breath sounds. No wheezing or rales. Genitourinary:     Penis: No tenderness. Musculoskeletal: Normal range of motion. General: No deformity. Right lower leg: No edema. Left lower leg: No edema.    Skin:     General: Skin is warm and dry. Findings: No erythema or rash. Neurological:      General: No focal deficit present. Mental Status: He is alert and oriented to person, place, and time. Mental status is at baseline. Motor: No weakness. Gait: Gait normal.   Psychiatric:         Mood and Affect: Mood normal.         Behavior: Behavior normal.         Thought Content: Thought content normal.         Judgment: Judgment normal.         ASSESSMENT/PLAN:  1. Shortness of breath    2. Physical deconditioning    Patient with continued shortness of breath and physical deconditioning that is residual post COVID-19 infection. Work up to date is benign. He is getting further work up with pulmonology  Recommend he continue to gradually advance his activity to improve his physical condition, endurance. Ok to return to work next week. After he checked out, looks like he made follow up with Dr Sarmad Veloz in our office. So he must be wanting to establish with him. This was not discussed in the office. Return in about 3 months (around 6/30/2021) for physical, Labs one week prior. [place]

## 2021-04-09 ENCOUNTER — TELEPHONE (OUTPATIENT)
Dept: FAMILY MEDICINE CLINIC | Age: 51
End: 2021-04-09

## 2021-04-09 RX ORDER — FUROSEMIDE 20 MG/1
20 TABLET ORAL DAILY
Qty: 5 TABLET | Refills: 0 | Status: SHIPPED | OUTPATIENT
Start: 2021-04-09 | End: 2021-04-14

## 2021-04-19 ENCOUNTER — TELEPHONE (OUTPATIENT)
Dept: FAMILY MEDICINE CLINIC | Age: 51
End: 2021-04-19

## 2021-04-19 NOTE — TELEPHONE ENCOUNTER
Patient called and stated he still has swelling in hands and feet. Wanted to know what you would recommend? Patient wanted to know if he needed another script for diuretic? Does patient need to come in and be seen? Please advise.

## 2021-04-20 NOTE — TELEPHONE ENCOUNTER
Recommend a follow up with me in office if possible later this week so we can check his weight, vitals, etc and maybe labs

## 2021-04-21 ENCOUNTER — OFFICE VISIT (OUTPATIENT)
Dept: FAMILY MEDICINE CLINIC | Age: 51
End: 2021-04-21
Payer: COMMERCIAL

## 2021-04-21 VITALS
BODY MASS INDEX: 36.25 KG/M2 | DIASTOLIC BLOOD PRESSURE: 68 MMHG | WEIGHT: 297.8 LBS | OXYGEN SATURATION: 94 % | SYSTOLIC BLOOD PRESSURE: 100 MMHG | HEART RATE: 82 BPM | TEMPERATURE: 96.2 F

## 2021-04-21 DIAGNOSIS — R06.02 SHORTNESS OF BREATH: ICD-10-CM

## 2021-04-21 DIAGNOSIS — R60.1 GENERALIZED EDEMA: Primary | ICD-10-CM

## 2021-04-21 PROBLEM — J01.40 ACUTE PANSINUSITIS: Status: RESOLVED | Noted: 2017-05-19 | Resolved: 2021-04-21

## 2021-04-21 PROBLEM — B35.9 RINGWORM: Status: RESOLVED | Noted: 2019-07-22 | Resolved: 2021-04-21

## 2021-04-21 PROBLEM — J20.8 ACUTE BRONCHITIS DUE TO OTHER SPECIFIED ORGANISMS: Status: RESOLVED | Noted: 2021-02-15 | Resolved: 2021-04-21

## 2021-04-21 PROCEDURE — 99213 OFFICE O/P EST LOW 20 MIN: CPT | Performed by: CLINICAL NURSE SPECIALIST

## 2021-04-21 RX ORDER — HYDROCHLOROTHIAZIDE 25 MG/1
25 TABLET ORAL EVERY MORNING
Qty: 90 TABLET | Refills: 1 | Status: SHIPPED | OUTPATIENT
Start: 2021-04-21 | End: 2022-02-07

## 2021-04-21 ASSESSMENT — ENCOUNTER SYMPTOMS
BACK PAIN: 0
ABDOMINAL PAIN: 0
VOMITING: 0
EYE DISCHARGE: 0
SINUS PRESSURE: 0
DIARRHEA: 0
EYE PAIN: 0
SHORTNESS OF BREATH: 1
TROUBLE SWALLOWING: 0
CONSTIPATION: 0
NAUSEA: 0
SORE THROAT: 0
COUGH: 0
COLOR CHANGE: 0
WHEEZING: 0
CHEST TIGHTNESS: 0

## 2021-04-21 NOTE — PROGRESS NOTES
SUBJECTIVE:  Stephanie Jiang is a 48 y.o. who presents today for Follow-up and Swelling      HPI    Mr Sabrina Miller presents today with generalized edema and shortness of breath. This has been noticeable over the past 2-3 weeks. He had residual shortness of breath post COVID and pneumonia but returned to work early April. He was doing well at work, back to near baseline but then developed some pitting edema to legs with abdominal fullness and shortness of breath at rest and with exertion. He called the office on 4/9 and was given lasix for 5 days. He noticed improvement in both swelling and shortness of breath, but once it was completed his symptoms returned. No orthopnea. He denies a change in diet or high sodium diet. No new medications. ECHO in March showed normal EF and diastolic function. cologuard for colon cancer screen    Past Medical History:   Diagnosis Date    Acid reflux     Adenomatous goiter     2013 left thyroidectomy  Dr Brigida Garrison Arthralgia of multiple sites     Arthritis     osteo    Chronic back pain     Effusion of patella     Gastric ulcer     Knee pain     Medial meniscus tear     Mixed hyperlipidemia     Obesity     Pain of right hand     Postoperative hypothyroidism     left lobectomy, Dr Brigida Garrison Prediabetes     Prolonged emergence from general anesthesia     fights upon emergence; works in [employer], woke feeling he was at work and being threatened.     Simple goiter     Solitary thyroid nodule     Thyroid disease      Past Surgical History:   Procedure Laterality Date    CHOLECYSTECTOMY, LAPAROSCOPIC      HERNIA REPAIR      umbilical    INGUINAL HERNIA REPAIR Left     age 10    KNEE ARTHROSCOPY Right     x2    KNEE CARTILAGE SURGERY Left 5/12/2016    ARTHROSCOPY PARTIAL MEDIAL MENISCECTOMY KNEE performed by Brittney Holden DO at [address] [place] [address]      wisdom teeth removed    THYROIDECTOMY, PARTIAL      Left thyroidectomy, Dr. Brigida Ortega, Jagdeep Garrison UPPER GASTROINTESTINAL ENDOSCOPY      VENTRAL HERNIA REPAIR       Family History   Problem Relation Age of Onset    Other Mother         hypothyroidism    Heart Disease Mother     Diabetes Mother     Heart Attack Father     Breast Cancer Maternal Aunt     Cancer Paternal Aunt      Social History     Tobacco Use    Smoking status: Never Smoker    Smokeless tobacco: Former User     Types: Chew   Substance Use Topics    Alcohol use: No     Current Outpatient Medications   Medication Sig Dispense Refill    hydroCHLOROthiazide (HYDRODIURIL) 25 MG tablet Take 1 tablet by mouth every morning 90 tablet 1    tobramycin-dexamethasone (TOBRADEX) 0.3-0.1 % ophthalmic suspension PLACE 1 DROP INTO BOTH EYES 3 TIMES DAILY      glipiZIDE (GLUCOTROL) 5 MG tablet Take 1 tablet by mouth daily 30 tablet 3    ondansetron (ZOFRAN) 4 MG tablet Take 1 tablet by mouth 3 times daily as needed for Nausea or Vomiting 30 tablet 0    losartan (COZAAR) 25 MG tablet Take 1 tablet by mouth daily 30 tablet 5    levothyroxine (SYNTHROID) 100 MCG tablet Take 1 tablet by mouth Daily 30 tablet 5    amitriptyline (ELAVIL) 25 MG tablet TAKE 1 TABLET BY MOUTH NIGHTLY 30 tablet 2    rizatriptan (MAXALT-MLT) 10 MG disintegrating tablet Take 1 tablet by mouth once as needed for Migraine May repeat in 2 hours if needed 30 tablet 3     No current facility-administered medications for this visit. No Known Allergies    Review of Systems   Constitutional: Positive for activity change and fatigue. Negative for appetite change, chills, diaphoresis and fever. HENT: Negative for congestion, ear pain, hearing loss, postnasal drip, sinus pressure, sore throat and trouble swallowing. Eyes: Negative for pain, discharge and visual disturbance. Respiratory: Positive for shortness of breath. Negative for cough, chest tightness and wheezing. Cardiovascular: Positive for leg swelling. Negative for chest pain and palpitations. Gastrointestinal: Negative for abdominal pain, constipation, diarrhea, nausea and vomiting. Genitourinary: Negative for difficulty urinating, dysuria, flank pain, frequency, hematuria and urgency. Musculoskeletal: Negative for arthralgias, back pain, joint swelling and neck pain. Skin: Negative for color change and rash. Neurological: Negative for dizziness, syncope, weakness, light-headedness and headaches. Psychiatric/Behavioral: Negative for behavioral problems, confusion and dysphoric mood. The patient is not nervous/anxious. OBJECTIVE:  /68   Pulse 82   Temp 96.2 °F (35.7 °C) (Temporal)   Wt 297 lb 12.8 oz (135.1 kg)   SpO2 94%   BMI 36.25 kg/m²    Physical Exam  Vitals signs reviewed. Constitutional:       General: He is not in acute distress. Appearance: He is well-developed. He is not ill-appearing or toxic-appearing. HENT:      Head: Normocephalic and atraumatic. Eyes:      General:         Right eye: No discharge. Left eye: No discharge. Conjunctiva/sclera: Conjunctivae normal.      Pupils: Pupils are equal, round, and reactive to light. Neck:      Musculoskeletal: Normal range of motion and neck supple. Thyroid: No thyromegaly. Trachea: No tracheal deviation. Cardiovascular:      Rate and Rhythm: Normal rate and regular rhythm. Heart sounds: No murmur. Pulmonary:      Effort: Pulmonary effort is normal. No respiratory distress. Breath sounds: Normal breath sounds. No wheezing or rales. Genitourinary:     Penis: No tenderness. Musculoskeletal: Normal range of motion. General: No deformity. Right lower leg: Edema (1+) present. Left lower leg: Edema (1+) present. Skin:     General: Skin is warm and dry. Findings: No erythema or rash. Neurological:      General: No focal deficit present. Mental Status: He is alert and oriented to person, place, and time. Mental status is at baseline.       Motor:

## 2021-04-27 ENCOUNTER — OFFICE VISIT (OUTPATIENT)
Dept: PULMONOLOGY | Age: 51
End: 2021-04-27
Payer: COMMERCIAL

## 2021-04-27 VITALS
HEART RATE: 75 BPM | HEIGHT: 76 IN | BODY MASS INDEX: 33.24 KG/M2 | WEIGHT: 273 LBS | TEMPERATURE: 98.4 F | OXYGEN SATURATION: 97 % | SYSTOLIC BLOOD PRESSURE: 122 MMHG | DIASTOLIC BLOOD PRESSURE: 72 MMHG

## 2021-04-27 DIAGNOSIS — R91.8 GROUND GLASS OPACITY PRESENT ON IMAGING OF LUNG: ICD-10-CM

## 2021-04-27 DIAGNOSIS — G47.33 OBSTRUCTIVE SLEEP APNEA SYNDROME: ICD-10-CM

## 2021-04-27 DIAGNOSIS — G47.34 SLEEP RELATED HYPOXIA: Primary | ICD-10-CM

## 2021-04-27 DIAGNOSIS — G47.10 HYPERSOMNIA: ICD-10-CM

## 2021-04-27 PROCEDURE — 99214 OFFICE O/P EST MOD 30 MIN: CPT | Performed by: INTERNAL MEDICINE

## 2021-04-27 ASSESSMENT — ENCOUNTER SYMPTOMS
SHORTNESS OF BREATH: 0
ANAL BLEEDING: 0
WHEEZING: 0
CHEST TIGHTNESS: 0
ABDOMINAL PAIN: 0
COUGH: 0
APNEA: 0
BACK PAIN: 0
ABDOMINAL DISTENTION: 0
RHINORRHEA: 0

## 2021-04-27 NOTE — PROGRESS NOTES
Pulmonary and Sleep Medicine     Mordecai Schaumann. (:  1970) is a 48 y.o. male,Established patient, here for evaluation of the following chief complaint(s):  Follow-up (4 weeks)      ASSESSMENT/PLAN:  1. Sleep related hypoxia  -     Full PFT Study With Bronchodilator; Future  2. Obstructive sleep apnea syndrome, did not tolerate the CPAP. -     Ambulatory referral to Sleep Medicine  -     Home Sleep Study; Future  3. Ground glass opacity present on imaging of lung  -     CT CHEST WO CONTRAST; Future  4. Hypersomnia    We will get a follow up CT of the chest to assess stability of the GGO and PFT in 3 months. Continue oxygen during sleep. Discussed the fact that sleep apnea is likely the cause of his oxygen desat and that he needs to be treated for sleep apnea. He is willing to try CPAP again. We will get a home sleep study to reassess his sleep apnea. Sleep apnea is a high risk condition his situation      Return in about 4 weeks (around 2021). SUBJECTIVE/OBJECTIVE:  He is here for follow-up. He had an overnight oximetry that showed severe oxygen desaturation during sleep. He does have history of obstructive sleep apnea and he said he could not tolerate the CPAP in the past.  He is currently on 4 L/min of oxygen during sleep. Prior to Visit Medications    Medication Sig Taking?  Authorizing Provider   hydroCHLOROthiazide (HYDRODIURIL) 25 MG tablet Take 1 tablet by mouth every morning Yes DENIS Jones   tobramycin-dexamethasone (TOBRADEX) 0.3-0.1 % ophthalmic suspension PLACE 1 DROP INTO BOTH EYES 3 TIMES DAILY Yes Historical Provider, MD   glipiZIDE (GLUCOTROL) 5 MG tablet Take 1 tablet by mouth daily Yes Becky Coker MD   ondansetron (ZOFRAN) 4 MG tablet Take 1 tablet by mouth 3 times daily as needed for Nausea or Vomiting Yes Becky Coker MD   losartan (COZAAR) 25 MG tablet Take 1 tablet by mouth daily Yes Becky Coker MD   levothyroxine (SYNTHROID) 100 MCG tablet Take 1 tablet by mouth Daily Yes Becky Coker MD   amitriptyline (ELAVIL) 25 MG tablet TAKE 1 TABLET BY MOUTH NIGHTLY Yes Becky Coker MD   rizatriptan (MAXALT-MLT) 10 MG disintegrating tablet Take 1 tablet by mouth once as needed for Migraine May repeat in 2 hours if needed  Becky Coker MD        Review of Systems   Constitutional: Negative for activity change, appetite change, chills, diaphoresis and fatigue. HENT: Negative for congestion, dental problem, drooling, ear discharge, postnasal drip and rhinorrhea. Eyes: Negative for visual disturbance. Respiratory: Negative for apnea, cough, chest tightness, shortness of breath and wheezing. Gastrointestinal: Negative for abdominal distention, abdominal pain and anal bleeding. Endocrine: Negative for cold intolerance, heat intolerance and polydipsia. Genitourinary: Negative for difficulty urinating, dysuria, enuresis and flank pain. Musculoskeletal: Negative for arthralgias, back pain and gait problem. Allergic/Immunologic: Negative for environmental allergies. Neurological: Negative for dizziness, facial asymmetry, light-headedness and headaches. Vitals:    04/27/21 1009   BP: 122/72   Pulse: 75   Temp: 98.4 °F (36.9 °C)   SpO2: 97%     Physical Exam  Vitals signs reviewed. Constitutional:       Appearance: Normal appearance. HENT:      Head: Normocephalic and atraumatic. Nose: Nose normal.   Eyes:      Extraocular Movements: Extraocular movements intact. Conjunctiva/sclera: Conjunctivae normal.   Neck:      Musculoskeletal: Normal range of motion and neck supple. Cardiovascular:      Rate and Rhythm: Normal rate and regular rhythm. Heart sounds: No murmur. No friction rub. Pulmonary:      Effort: Pulmonary effort is normal. No respiratory distress. Breath sounds: Normal breath sounds. No stridor. No wheezing, rhonchi or rales. Abdominal:      General: There is no distension. Palpations: There is no mass. Tenderness: There is no abdominal tenderness. There is no guarding or rebound. Neurological:      Mental Status: He is alert and oriented to person, place, and time. An electronic signature was used to authenticate this note.     --Shane Aranda MD

## 2021-04-28 DIAGNOSIS — G47.10 HYPERSOMNIA: ICD-10-CM

## 2021-04-28 DIAGNOSIS — G47.33 OBSTRUCTIVE SLEEP APNEA SYNDROME: Primary | ICD-10-CM

## 2021-05-21 ENCOUNTER — TELEPHONE (OUTPATIENT)
Dept: PULMONOLOGY | Age: 51
End: 2021-05-21

## 2021-05-25 ENCOUNTER — TELEPHONE (OUTPATIENT)
Dept: FAMILY MEDICINE CLINIC | Age: 51
End: 2021-05-25

## 2021-06-03 ENCOUNTER — TELEPHONE (OUTPATIENT)
Dept: FAMILY MEDICINE CLINIC | Age: 51
End: 2021-06-03

## 2021-06-03 DIAGNOSIS — R00.0 TACHYCARDIA: ICD-10-CM

## 2021-06-03 DIAGNOSIS — R06.02 SHORTNESS OF BREATH: Primary | ICD-10-CM

## 2021-06-03 NOTE — TELEPHONE ENCOUNTER
Patient left vm stating he is having trouble breathing still and his heart is racing. Patient also c/o fatigue. Patient would like to get a referral to a different pulmonologist because his current pulmonologist is unable to \"find anything wrong. \"    Please advise.

## 2021-06-04 ENCOUNTER — TELEPHONE (OUTPATIENT)
Dept: FAMILY MEDICINE CLINIC | Age: 51
End: 2021-06-04

## 2021-06-04 ENCOUNTER — HOSPITAL ENCOUNTER (OUTPATIENT)
Dept: NON INVASIVE DIAGNOSTICS | Age: 51
Discharge: HOME | End: 2021-06-04
Payer: COMMERCIAL

## 2021-06-04 DIAGNOSIS — R00.0 TACHYCARDIA: ICD-10-CM

## 2021-06-04 DIAGNOSIS — R06.02 SHORTNESS OF BREATH: ICD-10-CM

## 2021-06-04 PROCEDURE — 93242 EXT ECG>48HR<7D RECORDING: CPT

## 2021-06-04 NOTE — TELEPHONE ENCOUNTER
Advised patient he may go to [facility] either this afternoon or anytime Monday to get the Zio patch placed. No appointment necessary.

## 2021-06-24 ENCOUNTER — HOSPITAL ENCOUNTER (OUTPATIENT)
Dept: SLEEP CENTER | Age: 51
Discharge: HOME | End: 2021-06-26
Payer: COMMERCIAL

## 2021-06-24 PROCEDURE — 95810 POLYSOM 6/> YRS 4/> PARAM: CPT

## 2021-06-25 NOTE — PROGRESS NOTES
[place] [address]  [place], [address]  Phone [phone] Fax [phone]     Sleep Study Technician Review    Patient Name:  Tiffany Carbone. :   1970  Referring Provider: Abiodun Nunez *    Brief History:  Tiffany Muñiz is a 46 y.o. male with a history of GERDS, Intolerance of cpap who has been referred for a sleep study. Height: 6'4\"  Weight:  273 lbs  BMI: 33.23  Neck Circ: 20\"  MALLAMPATI: Type 4  ESS:      Type of Study: PSG  Time Stage Position Snore Hypopnea Obs Apnea Renetta Apnea PAP O2   2100 2 Left No No No No  RA   2200 2 Left No No No No  RA   2300 2 Left No No No No  RA   2400 2 Left No No No No  RA   0100 REM Right No Yes No No  RA   0200 2 Supine No No No No  RA   0300 Awake Right No No No No  2 lpm   0400 REM Right No Yes No No  2 lpm   0430 Awake Supine No No No No  2 lpm     Summary: Pt stated that he is having a hard time breathing during wake and sleep. Pt also stated that he has had problems breathing ever since he had COVID 19. Pt stated that he has been placed on home oxygen. Pts sleep study was started on room air. Oxygen was added at 2 lpm due to multiple events dropping oxygen levels down in the upper 70's to low 80's. Pt some respiratory events during REM. DME: Melanie Eddy     The study was reviewed briefly with Tiffany Mortonlay. Suman Mcgarry He will be notified of the formal results and recommendations after the study is scored and interpreted. The report will be sent to his referring provider.     Technician: KEVIN Parra

## 2021-06-30 ENCOUNTER — TELEPHONE (OUTPATIENT)
Dept: FAMILY MEDICINE CLINIC | Age: 51
End: 2021-06-30

## 2021-06-30 NOTE — TELEPHONE ENCOUNTER
Patient had to cancel his appt with Dr. Samir Murray but would like to reschedule that as soon as possible. Please advise patient @ [phone]. Thank you.

## 2021-07-01 ENCOUNTER — OFFICE VISIT (OUTPATIENT)
Age: 51
End: 2021-07-01

## 2021-07-01 VITALS — TEMPERATURE: 96.7 F

## 2021-07-01 DIAGNOSIS — Z11.59 SCREENING FOR VIRAL DISEASE: Primary | ICD-10-CM

## 2021-07-01 LAB — SARS-COV-2, PCR: NOT DETECTED

## 2021-07-01 PROCEDURE — 99999 PR OFFICE/OUTPT VISIT,PROCEDURE ONLY: CPT | Performed by: NURSE PRACTITIONER

## 2021-07-02 ENCOUNTER — HOSPITAL ENCOUNTER (OUTPATIENT)
Dept: PULMONOLOGY | Age: 51
Discharge: HOME | End: 2021-07-02
Payer: COMMERCIAL

## 2021-07-02 VITALS — HEIGHT: 76 IN | BODY MASS INDEX: 35.56 KG/M2 | WEIGHT: 292 LBS

## 2021-07-02 DIAGNOSIS — G47.34 SLEEP RELATED HYPOXIA: ICD-10-CM

## 2021-07-02 PROCEDURE — 94060 EVALUATION OF WHEEZING: CPT

## 2021-07-02 PROCEDURE — 94729 DIFFUSING CAPACITY: CPT

## 2021-07-02 PROCEDURE — 94727 GAS DIL/WSHOT DETER LNG VOL: CPT

## 2021-07-02 PROCEDURE — 6360000002 HC RX W HCPCS: Performed by: INTERNAL MEDICINE

## 2021-07-02 RX ORDER — ALBUTEROL SULFATE 2.5 MG/3ML
2.5 SOLUTION RESPIRATORY (INHALATION) EVERY 6 HOURS PRN
Status: DISCONTINUED | OUTPATIENT
Start: 2021-07-02 | End: 2021-07-04 | Stop reason: HOSPADM

## 2021-07-02 RX ADMIN — ALBUTEROL SULFATE 2.5 MG: 2.5 SOLUTION RESPIRATORY (INHALATION) at 10:12

## 2021-07-02 NOTE — LETTER
Pulmonary Fuction Testing Lab  [address] [facility], [address] [place] [address]  [phone]  Fax [phone]   May 26, 2021    Dear Samaria Elise.,      Please note this is an updated letter with new information for your upcoming appointment on 7/2/2021 at 9:30 AM. Our new protocol states that if you have had a Covid vaccine and it has been at least two weeks since the last dose; you will not be required to have a Covid test before your appointment. You must bring proof of vaccination. Otherwise, you must have your COVID-19 test on 7/1/2021 between 7 AM and 2 PM. You need to quarantine once you have done your Covid test until your appointment. We are required to have results back prior to your appointment. If your test is positive you will be called with those results. If you choose to have your Covid test done at another facility rather than at the [facility], you must have the printed test results in hand with you on the day of your appointment. Below is the address of the testing location:    [address] [place] [address] South: [address]  Mondays and Thursdays- 7 AM - 2 PM     Please do not use any bronchodilators (rescue inhalers, breathing treatments, ect.) 4 hours prior to test and no long acting respiratory medications 12 hours before test. These medications will cause test results to be inaccurate. On the day of your appointment you will come through the Main Entrance of the [facility] hospital in order to register at the [address] [place] [address] [facility] [place] registration. Upon entry you will be screened for any temperature, symptoms and history. After being screened you will be directed to the registration area. Please sign in to be registered. Once resgistered they will let me know. Thank you for entrusting us at [facility]) with your care. We look forward to caring for you.     [facility]) Pulmonary Function Lab    Elizabeth Luke Conway  Registered Respiratory Therapist

## 2021-07-02 NOTE — LETTER
Pulmonary Fuction Testing Lab  [address] [facility], [address] [place] [address]  [phone]  Fax [phone]   May 10, 2021    Dear Derek Thakkar,      This letter is to remind you of your upcoming appointment on 7/2/2021 at 9:30 AM. Due to the COVID-19 pandemic and state restrictions all patients are required to have a COVID-19 test done prior to Pulmonary Function Testing. You must have your COVID-19 test on 7/1/2021 between 8 AM and 12 PM. Your Covid test must be done 24-48 hours prior to you pulmonary test. You need to quarantine once you have done your Covid test until your appointment. We are required to have results back prior to your appointment. If your test is positive you will be called with those results. If you choose to have your Covid test done at another facility rather than at the [facility], you must have the printed test results in hand with you on the day of your appointment. Below is the addresses of the testing locations:    [address] [place] [address] South: [facility]  [facility]  [place], [address]  Monday thru Saturday- 8 AM- 12 PM    [address] [facility]  [address]  Monday thru Saturday - 8 AM- 10 AM     On the day of your appointment you will come through the Main Entrance of [facility] in order to register at the [address] [place] [address] [facility] [place] registration. Upon entry you will be screened for any temperature, symptoms and history. After being screened you will be directed to the registration area. Please sign in to be registered. Once registered they will let me know. Thank you for entrusting us at [facility]) with your care. We look forward to caring for you.     [facility]) Pulmonary Function Lab    Juan M Hobbs  Registered Respiratory Therapist

## 2021-07-05 DIAGNOSIS — G47.33 OBSTRUCTIVE SLEEP APNEA SYNDROME: Primary | ICD-10-CM

## 2021-07-05 PROCEDURE — 94060 EVALUATION OF WHEEZING: CPT | Performed by: INTERNAL MEDICINE

## 2021-07-05 PROCEDURE — 94729 DIFFUSING CAPACITY: CPT | Performed by: INTERNAL MEDICINE

## 2021-07-05 PROCEDURE — 94726 PLETHYSMOGRAPHY LUNG VOLUMES: CPT | Performed by: INTERNAL MEDICINE

## 2021-07-05 PROCEDURE — 95810 POLYSOM 6/> YRS 4/> PARAM: CPT | Performed by: INTERNAL MEDICINE

## 2021-07-05 NOTE — PROCEDURES
Polysomnography Report  Patient Name Iván Humphries Account Number [de-identified]    1970 Referring Provider Hardik Enrique M.D., [facility]   Age/ Gender 46 years/M Interpreting physician Hardik Enrique M.D., [facility]   Neck circumference/  Mallampati classification 20.0 in/class 4 Night Technician Maria R Xavier, RPSGT   Coeur D Alene score  Scoring Technician Rylee Reis, CRT, RPSGT   Height 76.0 in Indications for the test excessive daytime somnolence   Weight 273.0 lbs Test Diagnostic Polysomnogram   BMI 33.2 Date of test 2021     Procedure  A Diagnostic Polysomnogram was conducted on the night of 2021. The study was performed and scored per AASM guidelines. The following were monitored: frontal, central, and occipital EEG, electrooculogram (EOG), submentalis EMG, nasal and oral airflow, intranasal pressure, thoracic plethysmography, abdominal plethysmography, anterior tibialis EMG, electrocardiogram, body position, and positive airway pressure (PAP). Arterial oxygen saturation was monitored with a pulse oximeter. The study was scored utilizing 30 second epochs. Hypopneas were scored using per AASM definition VIII, D, 1B.     Sleep Scoring Data  Lights out 8:35:21 PM Sleep latency 4.7 min Time in N1 45.5 min N1% 10.3%   Lights on 4:29:45 AM WASO 26.2 min Time in N2 218.5 min N2% 49.3%   TIB/.4 min Sleep efficiency 94.5% Time in N3 93.5 min N3% 21.1%   .5 min REM latency 79.0 min Time in R 86.0 min R% 19.4%     Respiratory Events Summary   NREM REM Total   Hypopnea index 4.5 63.5 16.0   Apnea index 0.8 14.0 3.5   RERA index 0.0 0.0 0.0   AHI 5.4 77.4 19.5   RDI (AHI + RERA index) 5.4 77.4 19.5     Respiratory Events by Sleep Stage   Obstructive Apneas OA Index Central Apneas CA Index Mixed Apneas MA Index   Hypopneas H Index   RERAs   R Index   NREM 2 0.3 3 0.5 0 0.0 27 4.5 0 0.0   REM 9 6.3 11 7.7 0 0.0 91 63.5 0 0.0   Total 11 1.5 15 2.0 0 0.0 118 16.0 0 0.0 Respiratory Events by Body Position   Time (min) Obstructive Apneas OA Index Central Apneas CA Index Mixed Apneas MA Index   Hypopneas H Index   RERAs RERA  Index Total  AHI Total RDI   Supine 29.3  1 2.7 0 0.0 0 0.0 13 34.7 0 0.0 37.3 37.3   R/Supine                                           L/Supine                                           Right 201.5  7 2.2 0 0.0 0 0.0 60 18.8 0 0.0 21.0 21.0   Left 238.7  3 0.8 15 3.9 0 0.0 45 11.7 0 0.0 16.4 16.4   Prone                                           R/Prone                                           L/Prone                                           Up                                                 Snoring  Snoring Rating (loudness 0-4) 1   Snoring Amount (% of total sleep time with snoring) 26.9%     Oximetry Data              Wake NREM REM TST   Average Saturation  94% 93% 90% 92%   Desaturation Index (#/hour)  2.3 45.3 10.7   Desaturation Max Duration (sec)  73.0 69.5 73.0   Minimum O2 Saturation    71%     Oximetry Distribution              WaKe REM NREM TOTAL %TIB   <90% (min) 2.7 29.3 45.2 77.2 16.3%   <85% (min) 0.4 11.7 0.9 13.0 2.7%   <80% (min) 0.0 1.3 0.1 1.4 0.3%   <75% (min) 0.0 0.1 0.0 0.1 0.0%   <70% (min) 0.0 0.0 0.0 0.0 0.0%   <60% (min) 0.0 0.0 0.0 0.0 0.0%   <50% (min) 0.0 0.0 0.0 0.0 0.0%   Fail    (min) 0.3 0.0 0.0 0.3      Respiratory Pattern   Present Absent Duration   Hypoventilation  ? N/A   Cheyne Bradshaw Respirations  ? N/A   Periodic Breathing  ? N/A     Heart Rate   Mean heart rate (bmp) Maximum heart rate (bmp)   During recording       98   During sleep 66.9 97     Heart Rhythm Summary  Normal sinus rhythm     Heart Rhythm Events  Type of events Present Absent Rate-Duration/Total Duration   Bradycardia  ? N/A   Asystole  ? N/A   Sinus Tachycardia During Sleep  ? N/A   Narrow Complex Tachycardia  ? N/A   Wide Complex Tachycardia  ? N/A   Atrial Fibrillation  ? N/A   Other Arrhythmias  ?  N/A     Arousals   NREM REM Total Arousal Index Spontaneous 18 6 30 4.1   Respiratory 24 100 128 17.3   Snore 3 0 4 0.5   Leg movement 0 0 0 0.0   Total 45 106 166 22.5     Periodic Limb Movement Data (legs unless otherwise noted)   Leg Movements PLMS PLMS with arousal   # of events 0 0 0   Index (#/hr TST) 0 0 0     Interpretation:  1. Moderate complex sleep apnea. 2. Hypoxia during sleep. 3. Hypersomnia. 4. Obesity. Recommendations:  1. CPAP titration. 2. Weight loss and exercise. 3. Avoid risky activities such as driving if sleepy. 4. Discuss sleep hygiene with the patient. Lorenza Ingram MD, [facility], [facility]                 Note:   The interpreting physician named above, reviewed this record in its entirety, including sleep staging, EMG activity, EEG, EKG, breathing parameters, oxygen saturation, body position, and behavior unless otherwise noted. The interpretation is based on this information in addition to available clinical history and physical examination data.

## 2021-07-06 PROCEDURE — 93248 EXT ECG>7D<15D REV&INTERPJ: CPT | Performed by: INTERNAL MEDICINE

## 2021-07-06 NOTE — PROCEDURES
Cardiac event monitor report    Date of recording 6/4/2021 2 6/12/2021    Summary impressions:    1. Sinus rhythm minimal heart rate 45 average 84 maximal 144    2. No episodes of ventricular tachycardia were recorded    3. No pauses greater than 3.0 seconds were recorded    4. No episodes of complete or Mobitz 2 atrioventricular block were recorded    5. No episodes of atrial fibrillation were recorded    6. No episodes of supraventricular tachycardia were recorded    7. 2 triggered events 0 diary events sinus rhythm during those recordings    8.  Rare ectopy otherwise noted

## 2021-07-23 ENCOUNTER — OFFICE VISIT (OUTPATIENT)
Dept: FAMILY MEDICINE CLINIC | Age: 51
End: 2021-07-23
Payer: COMMERCIAL

## 2021-07-23 VITALS
RESPIRATION RATE: 18 BRPM | WEIGHT: 297 LBS | HEART RATE: 86 BPM | BODY MASS INDEX: 36.17 KG/M2 | DIASTOLIC BLOOD PRESSURE: 70 MMHG | SYSTOLIC BLOOD PRESSURE: 114 MMHG | HEIGHT: 76 IN | OXYGEN SATURATION: 98 % | TEMPERATURE: 97 F

## 2021-07-23 DIAGNOSIS — R06.02 SHORT OF BREATH ON EXERTION: Primary | ICD-10-CM

## 2021-07-23 DIAGNOSIS — G47.34 SLEEP RELATED HYPOXIA: ICD-10-CM

## 2021-07-23 DIAGNOSIS — R09.02 HYPOXIA: ICD-10-CM

## 2021-07-23 LAB
DISTANCE WALKED: NORMAL
SPO2: 88 %

## 2021-07-23 PROCEDURE — 94618 PULMONARY STRESS TESTING: CPT | Performed by: CLINICAL NURSE SPECIALIST

## 2021-07-23 PROCEDURE — 99213 OFFICE O/P EST LOW 20 MIN: CPT | Performed by: CLINICAL NURSE SPECIALIST

## 2021-07-23 ASSESSMENT — ENCOUNTER SYMPTOMS
COLOR CHANGE: 0
SORE THROAT: 0
EYE PAIN: 0
CHEST TIGHTNESS: 1
EYE DISCHARGE: 0
COUGH: 0
TROUBLE SWALLOWING: 0
SHORTNESS OF BREATH: 1
WHEEZING: 0
BACK PAIN: 0
SINUS PRESSURE: 0

## 2021-07-23 NOTE — PROGRESS NOTES
SUBJECTIVE:  Kamini French is a 46 y.o. who presents today for Shortness of Breath (feels worse at the end of the day; has oxygen that he uses at night, but thinks he needs something for when he is out moving around)      HPI    Mr Sonya Gavin presents today with ongoing exertional shortness of breath and fatigue. He has had nocturnal hypoxia for some time, wears oxygen at night. Recently had sleep study, diagnosed with KENISHA but pending office visit for titration of settings. He reports chest discomfort, soreness with breathing, fatigue and shortness of breath with moderate and minimal exertion. This is all post COVID. He has repeat CT chest and follow up with pulmonology in the next 2 weeks. Using neb treatments prn. Past Medical History:   Diagnosis Date    Acid reflux     Adenomatous goiter     2013 left thyroidectomy  Dr Dalton Rodgers   [facility] Arthralgia of multiple sites     Arthritis     osteo    Chronic back pain     Effusion of patella     Gastric ulcer     Knee pain     Medial meniscus tear     Mixed hyperlipidemia     Obesity     Pain of right hand     Postoperative hypothyroidism     left lobectomy, Dr Dalton Rodgers    [facility] Prediabetes     Prolonged emergence from general anesthesia     fights upon emergence; works in [employer], woke feeling he was at work and being threatened.     Simple goiter     Solitary thyroid nodule     Thyroid disease      Past Surgical History:   Procedure Laterality Date    CHOLECYSTECTOMY, LAPAROSCOPIC      HERNIA REPAIR      umbilical    INGUINAL HERNIA REPAIR Left     age 10    KNEE ARTHROSCOPY Right     x2    KNEE CARTILAGE SURGERY Left 5/12/2016    ARTHROSCOPY PARTIAL MEDIAL MENISCECTOMY KNEE performed by Mireille Manning DO at [address] [place] [address]      wisdom teeth removed    THYROIDECTOMY, PARTIAL      Left thyroidectomy, Dr. Dalton Rodgers, 2013    UPPER GASTROINTESTINAL ENDOSCOPY      VENTRAL HERNIA REPAIR       Family History   Problem Relation Age of Onset  Other Mother         hypothyroidism    Heart Disease Mother     Diabetes Mother     Heart Attack Father     Breast Cancer Maternal Aunt     Cancer Paternal Aunt      Social History     Tobacco Use    Smoking status: Never Smoker    Smokeless tobacco: Former User     Types: Chew   Substance Use Topics    Alcohol use: No     Current Outpatient Medications   Medication Sig Dispense Refill    hydroCHLOROthiazide (HYDRODIURIL) 25 MG tablet Take 1 tablet by mouth every morning 90 tablet 1    glipiZIDE (GLUCOTROL) 5 MG tablet Take 1 tablet by mouth daily 30 tablet 3    ondansetron (ZOFRAN) 4 MG tablet Take 1 tablet by mouth 3 times daily as needed for Nausea or Vomiting 30 tablet 0    losartan (COZAAR) 25 MG tablet Take 1 tablet by mouth daily 30 tablet 5    levothyroxine (SYNTHROID) 100 MCG tablet Take 1 tablet by mouth Daily 30 tablet 5    amitriptyline (ELAVIL) 25 MG tablet TAKE 1 TABLET BY MOUTH NIGHTLY 30 tablet 2    tobramycin-dexamethasone (TOBRADEX) 0.3-0.1 % ophthalmic suspension PLACE 1 DROP INTO BOTH EYES 3 TIMES DAILY (Patient not taking: Reported on 7/23/2021)      rizatriptan (MAXALT-MLT) 10 MG disintegrating tablet Take 1 tablet by mouth once as needed for Migraine May repeat in 2 hours if needed 30 tablet 3     No current facility-administered medications for this visit. No Known Allergies    Review of Systems   Constitutional: Positive for fatigue. Negative for appetite change, chills, diaphoresis and fever. HENT: Negative for congestion, ear pain, hearing loss, postnasal drip, sinus pressure, sore throat and trouble swallowing. Eyes: Negative for pain, discharge and visual disturbance. Respiratory: Positive for chest tightness and shortness of breath. Negative for cough and wheezing. Cardiovascular: Negative for chest pain and palpitations. Endocrine: Negative for cold intolerance and heat intolerance.    Genitourinary: Negative for difficulty urinating, dysuria, flank pain, frequency, hematuria and urgency. Musculoskeletal: Negative for arthralgias, back pain, joint swelling and neck pain. Skin: Negative for color change and rash. Neurological: Negative for dizziness, syncope, weakness, light-headedness and headaches. Psychiatric/Behavioral: Negative for behavioral problems, confusion and dysphoric mood. The patient is not nervous/anxious. OBJECTIVE:  /70   Pulse 86   Temp 97 °F (36.1 °C) (Temporal)   Resp 18   Ht 6' 4\" (1.93 m)   Wt 297 lb (134.7 kg)   SpO2 98%   BMI 36.15 kg/m²    Physical Exam  Vitals reviewed. Constitutional:       General: He is not in acute distress. Appearance: He is well-developed. He is not ill-appearing or toxic-appearing. HENT:      Head: Normocephalic and atraumatic. Eyes:      General:         Right eye: No discharge. Left eye: No discharge. Conjunctiva/sclera: Conjunctivae normal.      Pupils: Pupils are equal, round, and reactive to light. Neck:      Thyroid: No thyromegaly. Trachea: No tracheal deviation. Cardiovascular:      Rate and Rhythm: Normal rate and regular rhythm. Heart sounds: No murmur heard. Pulmonary:      Effort: Pulmonary effort is normal. No respiratory distress. Breath sounds: Normal breath sounds. No wheezing or rales. Genitourinary:     Penis: No tenderness. Musculoskeletal:         General: No deformity. Normal range of motion. Cervical back: Normal range of motion and neck supple. Right lower leg: No edema. Left lower leg: No edema. Skin:     General: Skin is warm and dry. Findings: No erythema or rash. Neurological:      Mental Status: He is alert and oriented to person, place, and time. Mental status is at baseline. Motor: No weakness. Gait: Gait normal.   Psychiatric:         Mood and Affect: Mood normal.         Behavior: Behavior normal.         Thought Content:  Thought content normal.         Judgment: Judgment normal.         ASSESSMENT/PLAN:  1. Short of breath on exertion  - 6 Minute Walk Test    2. Hypoxia  - DME Order for Home Oxygen as OP    3. Sleep related hypoxia    Add prn supplemental daytime oxygen at 2L per nasal cannula. He will need portable tank  6 minute walk sat on room air 88%  Returned to 94%  Continue to use neb treatments as needed for chest tightness, wheezing. No follow-ups on file.

## 2021-07-28 ENCOUNTER — HOSPITAL ENCOUNTER (OUTPATIENT)
Dept: CT IMAGING | Age: 51
Discharge: HOME | End: 2021-07-28
Payer: COMMERCIAL

## 2021-07-28 DIAGNOSIS — R91.8 GROUND GLASS OPACITY PRESENT ON IMAGING OF LUNG: ICD-10-CM

## 2021-07-28 PROCEDURE — 71250 CT THORAX DX C-: CPT

## 2021-08-03 ENCOUNTER — OFFICE VISIT (OUTPATIENT)
Dept: PULMONOLOGY | Age: 51
End: 2021-08-03
Payer: COMMERCIAL

## 2021-08-03 ENCOUNTER — TELEPHONE (OUTPATIENT)
Dept: FAMILY MEDICINE CLINIC | Age: 51
End: 2021-08-03

## 2021-08-03 VITALS
WEIGHT: 296 LBS | RESPIRATION RATE: 18 BRPM | TEMPERATURE: 98.4 F | DIASTOLIC BLOOD PRESSURE: 84 MMHG | SYSTOLIC BLOOD PRESSURE: 126 MMHG | BODY MASS INDEX: 36.04 KG/M2 | HEIGHT: 76 IN | HEART RATE: 85 BPM | OXYGEN SATURATION: 96 %

## 2021-08-03 DIAGNOSIS — R06.02 SHORT OF BREATH ON EXERTION: Primary | ICD-10-CM

## 2021-08-03 DIAGNOSIS — J98.4 RESTRICTIVE LUNG DISEASE: ICD-10-CM

## 2021-08-03 DIAGNOSIS — G47.34 SLEEP RELATED HYPOXIA: Primary | ICD-10-CM

## 2021-08-03 DIAGNOSIS — R06.02 SHORT OF BREATH ON EXERTION: ICD-10-CM

## 2021-08-03 DIAGNOSIS — G47.33 OBSTRUCTIVE SLEEP APNEA SYNDROME: ICD-10-CM

## 2021-08-03 DIAGNOSIS — R09.02 HYPOXIA: ICD-10-CM

## 2021-08-03 DIAGNOSIS — G47.10 HYPERSOMNIA: ICD-10-CM

## 2021-08-03 DIAGNOSIS — R00.0 TACHYCARDIA: ICD-10-CM

## 2021-08-03 DIAGNOSIS — U07.1 PNEUMONIA DUE TO COVID-19 VIRUS: ICD-10-CM

## 2021-08-03 DIAGNOSIS — G47.8 NON-RESTORATIVE SLEEP: ICD-10-CM

## 2021-08-03 DIAGNOSIS — J12.82 PNEUMONIA DUE TO COVID-19 VIRUS: ICD-10-CM

## 2021-08-03 LAB
ALBUMIN SERPL-MCNC: 3.8 G/DL (ref 3.5–5.2)
ALP BLD-CCNC: 82 U/L (ref 40–130)
ALT SERPL-CCNC: 30 U/L (ref 5–41)
ANION GAP SERPL CALCULATED.3IONS-SCNC: 13 MMOL/L (ref 7–19)
AST SERPL-CCNC: 22 U/L (ref 5–40)
BASOPHILS ABSOLUTE: 0 K/UL (ref 0–0.2)
BASOPHILS RELATIVE PERCENT: 0.2 % (ref 0–1)
BILIRUB SERPL-MCNC: 0.4 MG/DL (ref 0.2–1.2)
BUN BLDV-MCNC: 21 MG/DL (ref 6–20)
CALCIUM SERPL-MCNC: 9 MG/DL (ref 8.6–10)
CHLORIDE BLD-SCNC: 98 MMOL/L (ref 98–111)
CO2: 26 MMOL/L (ref 22–29)
CREAT SERPL-MCNC: 0.8 MG/DL (ref 0.5–1.2)
DISTANCE WALKED: 500 FT
EOSINOPHILS ABSOLUTE: 0.1 K/UL (ref 0–0.6)
EOSINOPHILS RELATIVE PERCENT: 1.4 % (ref 0–5)
GFR AFRICAN AMERICAN: >59
GFR NON-AFRICAN AMERICAN: >60
GLUCOSE BLD-MCNC: 172 MG/DL (ref 74–109)
HCT VFR BLD CALC: 43.3 % (ref 42–52)
HEMOGLOBIN: 14.3 G/DL (ref 14–18)
IMMATURE GRANULOCYTES #: 0.1 K/UL
LYMPHOCYTES ABSOLUTE: 2.2 K/UL (ref 1.1–4.5)
LYMPHOCYTES RELATIVE PERCENT: 23.8 % (ref 20–40)
MCH RBC QN AUTO: 27.5 PG (ref 27–31)
MCHC RBC AUTO-ENTMCNC: 33 G/DL (ref 33–37)
MCV RBC AUTO: 83.3 FL (ref 80–94)
MONOCYTES ABSOLUTE: 0.6 K/UL (ref 0–0.9)
MONOCYTES RELATIVE PERCENT: 6.1 % (ref 0–10)
NEUTROPHILS ABSOLUTE: 6.2 K/UL (ref 1.5–7.5)
NEUTROPHILS RELATIVE PERCENT: 67.6 % (ref 50–65)
PDW BLD-RTO: 13.8 % (ref 11.5–14.5)
PLATELET # BLD: 219 K/UL (ref 130–400)
PMV BLD AUTO: 9.7 FL (ref 9.4–12.4)
POTASSIUM SERPL-SCNC: 3.9 MMOL/L (ref 3.5–5)
RBC # BLD: 5.2 M/UL (ref 4.7–6.1)
RHEUMATOID FACTOR: <10 IU/ML
SARS-COV-2 ANTIBODY, TOTAL: POSITIVE
SEDIMENTATION RATE, ERYTHROCYTE: 8 MM/HR (ref 0–15)
SODIUM BLD-SCNC: 137 MMOL/L (ref 136–145)
SPO2: 93 %
TOTAL PROTEIN: 6.8 G/DL (ref 6.6–8.7)
WBC # BLD: 9.2 K/UL (ref 4.8–10.8)

## 2021-08-03 PROCEDURE — 94618 PULMONARY STRESS TESTING: CPT | Performed by: INTERNAL MEDICINE

## 2021-08-03 PROCEDURE — 99214 OFFICE O/P EST MOD 30 MIN: CPT | Performed by: INTERNAL MEDICINE

## 2021-08-03 ASSESSMENT — ENCOUNTER SYMPTOMS
WHEEZING: 0
SHORTNESS OF BREATH: 1
BACK PAIN: 0
APNEA: 1
ABDOMINAL DISTENTION: 0
ANAL BLEEDING: 0
ABDOMINAL PAIN: 0
RHINORRHEA: 0
CHEST TIGHTNESS: 1
COUGH: 1

## 2021-08-03 NOTE — PROGRESS NOTES
Pulmonary and Sleep Medicine    Williams Mora (:  1970) is a 46 y.o. male,Established patient, here for evaluation of the following chief complaint(s):  3 Month Follow-Up (CT & PFT)      ASSESSMENT/PLAN:  1. Sleep related hypoxia  2. Hypersomnia  3. Short of breath on exertion  -     CARDIAC STRESS TEST EXERCISE ONLY; Future  -     Echo 2d w doppler w color w contrast; Future  4. Non-restorative sleep  5. Obstructive sleep apnea syndrome  6. Restrictive lung disease on PFT done in 2021  -     GUS Screen with Reflex; Future  -     CBC Auto Differential; Future  -     Comprehensive Metabolic Panel; Future  -     Rheumatoid Factor; Future  -     Sedimentation Rate; Future    We will obtain autoimmune disease and connective tissue disorder work-up to assess for the etiology of his restriction seen on pulmonary function study although with this disorders usually there is findings on the CT that are suggestive. However his most recent CT shows resolution of the groundglass opacities with unremarkable lung parenchyma. Likely restriction is due to his body habitus. He continues to have symptoms of obstructive sleep apnea. He is due to have a sleep study as outpatient. We will get 6-minute walk again today to assess his physiology again. He had a normal Echo in March. Tayler Amaral MD, [facility], [facility]    Return in about 5 weeks (around 2021). SUBJECTIVE/OBJECTIVE:  He is here for follow up on shortness of breath and GGO seen on the previous imaging. His recent CT of of the chest done on  showed no evidence of GGO. PFT with restriction. He did desat on 21 on RA to 88% at his PCP office. Prior to Visit Medications    Medication Sig Taking?  Authorizing Provider   hydroCHLOROthiazide (HYDRODIURIL) 25 MG tablet Take 1 tablet by mouth every morning Yes DENIS Jones   glipiZIDE (GLUCOTROL) 5 MG tablet Take 1 tablet by mouth daily Yes Anahy Soto MD Jose   ondansetron (ZOFRAN) 4 MG tablet Take 1 tablet by mouth 3 times daily as needed for Nausea or Vomiting Yes Karri Guthrie MD   rizatriptan (MAXALT-MLT) 10 MG disintegrating tablet Take 1 tablet by mouth once as needed for Migraine May repeat in 2 hours if needed Yes Karri Guthrie MD   losartan (COZAAR) 25 MG tablet Take 1 tablet by mouth daily Yes Karri Guthrie MD   levothyroxine (SYNTHROID) 100 MCG tablet Take 1 tablet by mouth Daily Yes Karri uGthrie MD   amitriptyline (ELAVIL) 25 MG tablet TAKE 1 TABLET BY MOUTH NIGHTLY Yes Karri Guthrie MD        Review of Systems   Constitutional: Negative for activity change, appetite change, chills, diaphoresis and fatigue. HENT: Negative for congestion, dental problem, drooling, ear discharge, postnasal drip and rhinorrhea. Eyes: Negative for visual disturbance. Respiratory: Positive for apnea, cough, chest tightness and shortness of breath. Negative for wheezing. Gastrointestinal: Negative for abdominal distention, abdominal pain and anal bleeding. Endocrine: Negative for cold intolerance, heat intolerance and polydipsia. Genitourinary: Negative for difficulty urinating, dysuria, enuresis and flank pain. Musculoskeletal: Negative for arthralgias, back pain and gait problem. Allergic/Immunologic: Negative for environmental allergies. Neurological: Negative for dizziness, facial asymmetry, light-headedness and headaches. Vitals:    08/03/21 1039   BP: 126/84   Pulse: 85   Resp: 18   Temp: 98.4 °F (36.9 °C)   SpO2: 96%     Physical Exam  Vitals reviewed. Constitutional:       Appearance: Normal appearance. HENT:      Head: Normocephalic and atraumatic. Nose: Nose normal.   Eyes:      Extraocular Movements: Extraocular movements intact. Conjunctiva/sclera: Conjunctivae normal.   Cardiovascular:      Rate and Rhythm: Normal rate and regular rhythm. Heart sounds: No murmur heard. No friction rub. Pulmonary:      Effort: Pulmonary effort is normal. No respiratory distress. Breath sounds: Normal breath sounds. No stridor. No wheezing, rhonchi or rales. Abdominal:      General: There is no distension. Palpations: There is no mass. Tenderness: There is no abdominal tenderness. There is no guarding or rebound. Musculoskeletal:      Cervical back: Normal range of motion and neck supple. Neurological:      Mental Status: He is alert and oriented to person, place, and time. This note was generated used a voice recognition software. Errors in voice recognition may have occurred. An electronic signature was used to authenticate this note.     --Adebayo Finch MD

## 2021-08-03 NOTE — TELEPHONE ENCOUNTER
Patient would like to see about getting a referral to Dr. [employer] to see if the problems he is having could be related to his heart. Please advise if approved.

## 2021-08-03 NOTE — TELEPHONE ENCOUNTER
Spoke with patient in regards to concerns her has about a COVID outbreak at the [employer] he works at. He is concerned that he already had COVID and has several health issues after having COVID. He is wanting to know if he can have the antibody infusion again or see if he still has antibodies from when he was diagnosed. Spoke with [facility] [place], DENIS, and she stated he can only have the infusion if he is currently COVID positive. We can add a COVID antibody test to his lab work and see if antibodies are still present. She also recommended that he get a COVID vaccine to help increase his antibodies and decrease his risk of infection/symptoms. Spoke with patient and he has been notified of this and all questions have been answered and he voice understood.

## 2021-08-06 LAB — ANA IGG, ELISA: NORMAL

## 2021-08-17 ENCOUNTER — HOSPITAL ENCOUNTER (OUTPATIENT)
Dept: NON INVASIVE DIAGNOSTICS | Age: 51
Discharge: HOME | End: 2021-08-17
Payer: COMMERCIAL

## 2021-08-17 DIAGNOSIS — R06.02 SHORT OF BREATH ON EXERTION: ICD-10-CM

## 2021-08-17 LAB
LV EF: 58 %
LVEF MODALITY: NORMAL

## 2021-08-17 PROCEDURE — C8929 TTE W OR WO FOL WCON,DOPPLER: HCPCS

## 2021-08-17 PROCEDURE — 6360000004 HC RX CONTRAST MEDICATION: Performed by: INTERNAL MEDICINE

## 2021-08-17 PROCEDURE — 2580000003 HC RX 258: Performed by: INTERNAL MEDICINE

## 2021-08-17 PROCEDURE — 93017 CV STRESS TEST TRACING ONLY: CPT

## 2021-08-17 RX ORDER — SODIUM CHLORIDE 0.9 % (FLUSH) 0.9 %
5-40 SYRINGE (ML) INJECTION PRN
Status: DISCONTINUED | OUTPATIENT
Start: 2021-08-17 | End: 2021-08-18 | Stop reason: HOSPADM

## 2021-08-17 RX ADMIN — PERFLUTREN 1.65 MG: 6.52 INJECTION, SUSPENSION INTRAVENOUS at 10:15

## 2021-08-17 RX ADMIN — Medication 10 ML: at 10:15

## 2021-08-20 RX ORDER — CHLORPROMAZINE HYDROCHLORIDE 50 MG/1
50 TABLET, FILM COATED ORAL NIGHTLY
Qty: 30 TABLET | Refills: 0 | Status: SHIPPED | OUTPATIENT
Start: 2021-08-20 | End: 2022-05-11 | Stop reason: ALTCHOICE

## 2021-08-20 NOTE — TELEPHONE ENCOUNTER
Spoke with patient and he states he only takes this medication as needed for headaches and it has been a long time since he has filled it. He states he has had a headache for 3 days now and that seems to usually help. Please advise if refill is approved.

## 2021-09-07 ENCOUNTER — OFFICE VISIT (OUTPATIENT)
Dept: PULMONOLOGY | Age: 51
End: 2021-09-07
Payer: COMMERCIAL

## 2021-09-07 VITALS
OXYGEN SATURATION: 99 % | HEART RATE: 88 BPM | TEMPERATURE: 96.8 F | SYSTOLIC BLOOD PRESSURE: 134 MMHG | WEIGHT: 305 LBS | HEIGHT: 76 IN | RESPIRATION RATE: 18 BRPM | BODY MASS INDEX: 37.14 KG/M2 | DIASTOLIC BLOOD PRESSURE: 82 MMHG

## 2021-09-07 DIAGNOSIS — G47.10 HYPERSOMNIA: ICD-10-CM

## 2021-09-07 DIAGNOSIS — G47.8 NON-RESTORATIVE SLEEP: ICD-10-CM

## 2021-09-07 DIAGNOSIS — J98.4 RESTRICTIVE LUNG DISEASE: ICD-10-CM

## 2021-09-07 DIAGNOSIS — R06.02 SHORT OF BREATH ON EXERTION: Primary | ICD-10-CM

## 2021-09-07 DIAGNOSIS — G47.34 SLEEP RELATED HYPOXIA: ICD-10-CM

## 2021-09-07 PROCEDURE — 99214 OFFICE O/P EST MOD 30 MIN: CPT | Performed by: INTERNAL MEDICINE

## 2021-09-07 ASSESSMENT — ENCOUNTER SYMPTOMS
CHEST TIGHTNESS: 0
ANAL BLEEDING: 0
COUGH: 0
SHORTNESS OF BREATH: 1
BACK PAIN: 0
RHINORRHEA: 0
ABDOMINAL PAIN: 0
WHEEZING: 0
ABDOMINAL DISTENTION: 0
APNEA: 0

## 2021-09-07 NOTE — PROGRESS NOTES
Pulmonary and Sleep Medicine    Osbaldo Bebe. (:  1970) is a 46 y.o. male,Established patient, here for evaluation of the following chief complaint(s):  Follow-up (echo and stress test)      ASSESSMENT/PLAN:  1. Short of breath on exertion  2. Restrictive lung disease on PFT done in 2021  3. Sleep related hypoxia  4. Hypersomnia  5. Non-restorative sleep        At this time he continues to have significant symptoms of shortness of breath without significant findings to explain his symptoms on testing. Cardiac stress test did not show ischemia, Echo is normal, PFT with some restriction in the setting of obesity with BMI of 37.13 and negative autoimmune work up and negative CT findings. Will refer to [facility] pulmonary for further evaluation. Follow up after the above and sleep study. Jose F Marquez [facility], [facility], [facility]    No follow-ups on file. SUBJECTIVE/OBJECTIVE:  He is here for follow up on his testing and on shortness of breath with exertion. He says he continues to be short of breath with exertion. He says he can not predict how far he can walk without symptoms. He did have oxygen desat with exrtion on , then on August 3 he did have another 6 min walk and lowest he got to was 93%. He refused 6 min walk test today. He continues to check his sats at home and he says the lowest he got to was 93%. He is sleeping the supplemental oxygen. His cardiac stress test was negative, his Echo was normal. Most recent CT of the chest was unremarkable. His PFT showed restrictive pattern but did not correlate with findings on CT and his autoimmune workup was negative. Sleep study is pending. He refused a 6 min walk today because he did not feel it will add any new information. Prior to Visit Medications    Medication Sig Taking?  Authorizing Provider   chlorproMAZINE (THORAZINE) 50 MG tablet Take 1 tablet by mouth nightly Yes DENIS Mitchell   hydroCHLOROthiazide (HYDRODIURIL) 25 MG tablet Take 1 tablet by mouth every morning Yes DENIS Jones   glipiZIDE (GLUCOTROL) 5 MG tablet Take 1 tablet by mouth daily Yes Candice Lane MD   ondansetron (ZOFRAN) 4 MG tablet Take 1 tablet by mouth 3 times daily as needed for Nausea or Vomiting Yes Candice Lane MD   losartan (COZAAR) 25 MG tablet Take 1 tablet by mouth daily Yes Candice Lane MD   levothyroxine (SYNTHROID) 100 MCG tablet Take 1 tablet by mouth Daily Yes Candice Lane MD   amitriptyline (ELAVIL) 25 MG tablet TAKE 1 TABLET BY MOUTH NIGHTLY Yes Candice Lane MD   rizatriptan (MAXALT-MLT) 10 MG disintegrating tablet Take 1 tablet by mouth once as needed for Migraine May repeat in 2 hours if needed  Candice Lane MD        Review of Systems   Constitutional: Negative for activity change, appetite change, chills, diaphoresis and fatigue. HENT: Negative for congestion, dental problem, drooling, ear discharge, postnasal drip and rhinorrhea. Eyes: Negative for visual disturbance. Respiratory: Positive for shortness of breath. Negative for apnea, cough, chest tightness and wheezing. Gastrointestinal: Negative for abdominal distention, abdominal pain and anal bleeding. Endocrine: Negative for cold intolerance, heat intolerance and polydipsia. Genitourinary: Negative for difficulty urinating, dysuria, enuresis and flank pain. Musculoskeletal: Negative for arthralgias, back pain and gait problem. Allergic/Immunologic: Negative for environmental allergies. Neurological: Negative for dizziness, facial asymmetry, light-headedness and headaches. Vitals:    09/07/21 1120   BP: 134/82   Pulse: 88   Resp: 18   Temp: 96.8 °F (36 °C)   SpO2: 99%     Physical Exam  Vitals reviewed. Constitutional:       Appearance: Normal appearance. HENT:      Head: Normocephalic and atraumatic. Nose: Nose normal.   Eyes:      Extraocular Movements: Extraocular movements intact.

## 2021-09-09 ENCOUNTER — TELEPHONE (OUTPATIENT)
Dept: FAMILY MEDICINE CLINIC | Age: 51
End: 2021-09-09

## 2021-09-09 DIAGNOSIS — B35.4 TINEA OF THE BODY: Primary | ICD-10-CM

## 2021-09-09 NOTE — TELEPHONE ENCOUNTER
----- Message from Dolores Corin sent at 9/9/2021  2:59 PM CDT -----  Subject: Message to Provider    QUESTIONS  Information for Provider? Pt would like a medication prescribed for   ringworm. Pt said he received lotion a couple summers ago (2019) but can   not remember the name. He has ringworm on his ankle again  ---------------------------------------------------------------------------  --------------  CALL BACK INFO  What is the best way for the office to contact you? OK to leave message on   voicemail  Preferred Call Back Phone Number?  [phone]  ---------------------------------------------------------------------------  --------------  SCRIPT ANSWERS  undefined

## 2021-09-10 DIAGNOSIS — E03.9 ACQUIRED HYPOTHYROIDISM: Primary | ICD-10-CM

## 2021-09-10 RX ORDER — LEVOTHYROXINE SODIUM 0.1 MG/1
100 TABLET ORAL DAILY
Qty: 30 TABLET | Refills: 5 | Status: SHIPPED | OUTPATIENT
Start: 2021-09-10 | End: 2022-06-28

## 2021-09-10 RX ORDER — CLOTRIMAZOLE AND BETAMETHASONE DIPROPIONATE 10; .64 MG/G; MG/G
CREAM TOPICAL
Qty: 45 G | Refills: 0 | Status: SHIPPED | OUTPATIENT
Start: 2021-09-10

## 2021-09-10 NOTE — TELEPHONE ENCOUNTER
Osbaldo Later. called to request a refill on his medication.       Last office visit : 7/23/2021   Next office visit : Visit date not found     Requested Prescriptions     Signed Prescriptions Disp Refills    levothyroxine (SYNTHROID) 100 MCG tablet 30 tablet 5     Sig: Take 1 tablet by mouth Daily     Authorizing Provider: Katelynn Plummer     Ordering User: Francis Ramos MA

## 2021-09-13 ENCOUNTER — OFFICE VISIT (OUTPATIENT)
Dept: CARDIOLOGY | Facility: CLINIC | Age: 51
End: 2021-09-13

## 2021-09-13 VITALS
WEIGHT: 300 LBS | OXYGEN SATURATION: 99 % | HEART RATE: 75 BPM | SYSTOLIC BLOOD PRESSURE: 100 MMHG | DIASTOLIC BLOOD PRESSURE: 70 MMHG | BODY MASS INDEX: 36.53 KG/M2 | HEIGHT: 76 IN

## 2021-09-13 DIAGNOSIS — I10 ESSENTIAL HYPERTENSION: ICD-10-CM

## 2021-09-13 DIAGNOSIS — R06.09 DOE (DYSPNEA ON EXERTION): Primary | ICD-10-CM

## 2021-09-13 DIAGNOSIS — R07.2 PRECORDIAL PAIN: ICD-10-CM

## 2021-09-13 PROCEDURE — 99204 OFFICE O/P NEW MOD 45 MIN: CPT | Performed by: INTERNAL MEDICINE

## 2021-09-13 PROCEDURE — 93000 ELECTROCARDIOGRAM COMPLETE: CPT | Performed by: INTERNAL MEDICINE

## 2021-09-13 RX ORDER — HYDROCHLOROTHIAZIDE 25 MG/1
25 TABLET ORAL EVERY MORNING
COMMUNITY
Start: 2021-09-02

## 2021-09-13 RX ORDER — CHLORPROMAZINE HYDROCHLORIDE 50 MG/1
50 TABLET, FILM COATED ORAL NIGHTLY
COMMUNITY
Start: 2021-08-24

## 2021-09-13 RX ORDER — CLOTRIMAZOLE AND BETAMETHASONE DIPROPIONATE 10; .64 MG/G; MG/G
CREAM TOPICAL AS NEEDED
COMMUNITY
Start: 2021-09-10

## 2021-09-13 RX ORDER — AMITRIPTYLINE HYDROCHLORIDE 25 MG/1
25 TABLET, FILM COATED ORAL AS NEEDED
COMMUNITY
End: 2022-04-12

## 2021-09-13 NOTE — ASSESSMENT & PLAN NOTE
Probably due to combination of sleep apnea and his covid infection given his normal stress test and echo. I will try and get a copy of his echo from [facility]. He seems to want to get a second opinion from another pulmonologist

## 2021-09-13 NOTE — PROGRESS NOTES
Referring Provider: Adriana Valenzuela APRN    Reason for Consultation: VICENTE    Chief complaint:   Chief Complaint   Patient presents with   • New Patient     referred by ELGIN Mccarthy for evaluation of sob, fatigue and chest discomfort.   • Chest Pain     pt describes this as being a discomfort after working all night.   • Shortness of Breath     pt descibes the sob as all the time after he had covid in Feb.   • Fatigue     pt states he has had the fatigue since feb after he had covid.   • Hyperlipidemia     last lab done 10/2020 LDL was 114 on no medication       Subjective .     History of present illness:  Rolando Andrade is a 51 y.o. yo male with history of Covid infection in February. Since then he has had progressive VICENTE. He had a normal stress test and echo recently at [facility]. He apparently has had abnormal breathing tests. He does not smoke.  Chief Complaint   Patient presents with   • New Patient     referred by ELGIN Mccarthy for evaluation of sob, fatigue and chest discomfort.   • Chest Pain     pt describes this as being a discomfort after working all night.   • Shortness of Breath     pt descibes the sob as all the time after he had covid in Feb.   • Fatigue     pt states he has had the fatigue since feb after he had covid.   • Hyperlipidemia     last lab done 10/2020 LDL was 114 on no medication   .    History  Past Medical History:   Diagnosis Date   • Acute pansinusitis 5/19/2017   • Goiter    • Headache    • Hyperlipidemia    • Hypertension    • Hypothyroidism    • Sleep apnea     uses O2 at night   ,   Past Surgical History:   Procedure Laterality Date   • CHOLECYSTECTOMY     • HERNIA REPAIR     • KNEE SURGERY     • THYROIDECTOMY Left    ,   Family History   Problem Relation Age of Onset   • Diabetes Mother    • Arrhythmia Mother    • Heart disease Father    • Heart attack Father    • Cancer Maternal Aunt    • Cancer Maternal Uncle    • Cancer Paternal Aunt    • Multiple  sclerosis Sister    • Hypertension Brother    ,   Social History     Tobacco Use   • Smoking status: Never Smoker   • Smokeless tobacco: Never Used   Vaping Use   • Vaping Use: Never used   Substance Use Topics   • Alcohol use: Not Currently   • Drug use: Never   ,     Medications  Current Outpatient Medications   Medication Sig Dispense Refill   • albuterol (PROVENTIL) (2.5 MG/3ML) 0.083% nebulizer solution Take 2.5 mg by nebulization Every 4 (Four) Hours As Needed for Wheezing. 25 each 0   • amitriptyline (ELAVIL) 25 MG tablet Take 25 mg by mouth As Needed for Sleep.     • benzonatate (TESSALON) 200 MG capsule Take 200 mg by mouth 3 (Three) Times a Day As Needed for Cough.     • chlorproMAZINE (THORAZINE) 50 MG tablet Take 50 mg by mouth Every Night.     • clotrimazole-betamethasone (LOTRISONE) 1-0.05 % cream As Needed.     • glyburide (DIAbeta) 5 MG tablet Take 5 mg by mouth Daily With Breakfast.     • hydroCHLOROthiazide (HYDRODIURIL) 25 MG tablet Take 25 mg by mouth Every Morning.     • HYDROcodone-acetaminophen (NORCO) 7.5-325 MG per tablet Take  by mouth Every 6 (Six) Hours.     • levothyroxine (SYNTHROID, LEVOTHROID) 100 MCG tablet TAKE ONE TABLET EVERY DAY 30 tablet 9   • losartan (COZAAR) 25 MG tablet Take 25 mg by mouth As Needed. When gets a headache     • ondansetron (ZOFRAN) 4 MG tablet 4 mg Every 8 (Eight) Hours As Needed.     • rizatriptan MLT (MAXALT-MLT) 10 MG disintegrating tablet Place 10 mg on the tongue 1 (One) Time As Needed.       No current facility-administered medications for this visit.        Allergies:  Patient has no known allergies.    Review of Systems  Review of Systems   HENT: Negative for nosebleeds.    Cardiovascular: Positive for chest pain and dyspnea on exertion. Negative for claudication, leg swelling, near-syncope, orthopnea, palpitations, paroxysmal nocturnal dyspnea and syncope.   Respiratory: Negative for hemoptysis and shortness of breath.    Gastrointestinal: Negative  "for melena.   Genitourinary: Negative for hematuria.       Objective     Physical Exam:  /70   Pulse 75   Ht 193 cm (76\")   Wt 136 kg (300 lb)   SpO2 99%   BMI 36.52 kg/m²   Pulmonary:      Effort: Pulmonary effort is normal.      Breath sounds: Normal breath sounds.   Cardiovascular:      Normal rate. Regular rhythm.      Murmurs: There is no murmur.   Edema:     Peripheral edema present.     Pretibial: bilateral 1+ edema of the pretibial area.     Ankle: bilateral 1+ edema of the ankle.        Results Review:   I reviewed the patient's new clinical results.    ECG 12 Lead    Date/Time: 9/13/2021 10:15 AM  Performed by: Seun Rowan MD  Authorized by: Seun Rowan MD   Previous ECG: no previous ECG available  Rhythm: sinus rhythm  Rate: normal  Conduction: conduction normal  ST Segments: ST segments normal  T Waves: T waves normal  QRS axis: normal    Clinical impression: normal ECG            Admission on 02/20/2021, Discharged on 02/20/2021   Component Date Value Ref Range Status   • QT Interval 02/20/2021 370  ms Final   • QTC Interval 02/20/2021 442  ms Final   • Extra Tube 02/20/2021 hold for add-on   Final    Auto resulted   • Extra Tube 02/20/2021 Hold for add-ons.   Final    Auto resulted.   • Extra Tube 02/20/2021 hold for add-on   Final    Auto resulted   • Extra Tube 02/20/2021 Hold for add-ons.   Final    Auto resulted.   • Extra Tube 02/20/2021 Hold for add-ons.   Final    Auto resulted.   • Extra Tube 02/20/2021 Hold for add-ons.   Final    Auto resulted.   • Glucose 02/20/2021 138* 65 - 99 mg/dL Final   • BUN 02/20/2021 24* 6 - 20 mg/dL Final   • Creatinine 02/20/2021 0.62* 0.76 - 1.27 mg/dL Final   • Sodium 02/20/2021 137  136 - 145 mmol/L Final   • Potassium 02/20/2021 3.7  3.5 - 5.2 mmol/L Final    Slight hemolysis detected by analyzer. Results may be affected.   • Chloride 02/20/2021 99  98 - 107 mmol/L Final   • CO2 02/20/2021 26.0  22.0 - 29.0 mmol/L Final   • Calcium 02/20/2021 " 9.0  8.6 - 10.5 mg/dL Final   • Total Protein 02/20/2021 7.3  6.0 - 8.5 g/dL Final   • Albumin 02/20/2021 3.90  3.50 - 5.20 g/dL Final   • ALT (SGPT) 02/20/2021 24  1 - 41 U/L Final   • AST (SGOT) 02/20/2021 21  1 - 40 U/L Final    Slight hemolysis detected by analyzer. Results may be affected.   • Alkaline Phosphatase 02/20/2021 71  39 - 117 U/L Final   • Total Bilirubin 02/20/2021 0.5  0.0 - 1.2 mg/dL Final   • eGFR Non African Amer 02/20/2021 137  >60 mL/min/1.73 Final   • Globulin 02/20/2021 3.4  gm/dL Final   • A/G Ratio 02/20/2021 1.1  g/dL Final   • BUN/Creatinine Ratio 02/20/2021 38.7* 7.0 - 25.0 Final   • Anion Gap 02/20/2021 12.0  5.0 - 15.0 mmol/L Final   • PTT 02/20/2021 32.9  24.1 - 35.0 seconds Final   • Protime 02/20/2021 13.3  11.9 - 14.6 Seconds Final   • INR 02/20/2021 1.05  0.91 - 1.09 Final   • Troponin T 02/20/2021 <0.010  0.000 - 0.030 ng/mL Final   • D-Dimer, Quantitative 02/20/2021 <0.22  0.00 - 0.50 mg/L (FEU) Final   • WBC 02/20/2021 7.75  3.40 - 10.80 10*3/mm3 Final   • RBC 02/20/2021 5.30  4.14 - 5.80 10*6/mm3 Final   • Hemoglobin 02/20/2021 14.2  13.0 - 17.7 g/dL Final   • Hematocrit 02/20/2021 41.5  37.5 - 51.0 % Final   • MCV 02/20/2021 78.3* 79.0 - 97.0 fL Final   • MCH 02/20/2021 26.8  26.6 - 33.0 pg Final   • MCHC 02/20/2021 34.2  31.5 - 35.7 g/dL Final   • RDW 02/20/2021 13.8  12.3 - 15.4 % Final   • RDW-SD 02/20/2021 39.3  37.0 - 54.0 fl Final   • MPV 02/20/2021 8.7  6.0 - 12.0 fL Final   • Platelets 02/20/2021 254  140 - 450 10*3/mm3 Final   • Neutrophil % 02/20/2021 66.9  42.7 - 76.0 % Final   • Lymphocyte % 02/20/2021 22.6  19.6 - 45.3 % Final   • Monocyte % 02/20/2021 7.9  5.0 - 12.0 % Final   • Eosinophil % 02/20/2021 0.5  0.3 - 6.2 % Final   • Basophil % 02/20/2021 0.4  0.0 - 1.5 % Final   • Immature Grans % 02/20/2021 1.7* 0.0 - 0.5 % Final   • Neutrophils, Absolute 02/20/2021 5.19  1.70 - 7.00 10*3/mm3 Final   • Lymphocytes, Absolute 02/20/2021 1.75  0.70 - 3.10 10*3/mm3  Final   • Monocytes, Absolute 02/20/2021 0.61  0.10 - 0.90 10*3/mm3 Final   • Eosinophils, Absolute 02/20/2021 0.04  0.00 - 0.40 10*3/mm3 Final   • Basophils, Absolute 02/20/2021 0.03  0.00 - 0.20 10*3/mm3 Final   • Immature Grans, Absolute 02/20/2021 0.13* 0.00 - 0.05 10*3/mm3 Final   • nRBC 02/20/2021 0.0  0.0 - 0.2 /100 WBC Final       Assessment/Plan   Problem List Items Addressed This Visit        Cardiac and Vasculature    VICENTE (dyspnea on exertion)    Current Assessment & Plan     Probably due to combination of sleep apnea and his covid infection given his normal stress test and echo. I will try and get a copy of his echo from [facility]. He seems to want to get a second opinion from another pulmonologist         Essential hypertension - Primary    Current Assessment & Plan     Well controlled         Relevant Medications    hydroCHLOROthiazide (HYDRODIURIL) 25 MG tablet    Precordial pain    Current Assessment & Plan     Atypical and nonexertional. Had a normal stress test recently at [facility]               Medical Complexity  must have 2 out of 3     Moderate Complexity Level 4           1 of the following medical problems:          []One chronic illness with mild exacerbation         [x]Two or more stable chronic illness          [x]One new problem  []One acute illness with systemic symptoms    Complexity of Data  Reviewed (1 out of the 3 following categories)      Category 1 tests, documents, historian (must have 3 points)       []Review of prior external records  [x]Review of results of unique tests  []Ordering unique tests  []Assessment requires an independent historian    Category 2 Interpretation of tests   [x]Independent interpretation of test read by another doc    Category 3 Discuss Management/tests  []Discussion with external physician    Risk of complications and/or morbidity          [x]   Prescription Drug Management    I agree with the current medical regimen

## 2021-09-30 ENCOUNTER — HOSPITAL ENCOUNTER (OUTPATIENT)
Dept: SLEEP CENTER | Age: 51
Discharge: HOME | End: 2021-10-02
Payer: COMMERCIAL

## 2021-09-30 PROCEDURE — 95811 POLYSOM 6/>YRS CPAP 4/> PARM: CPT

## 2021-10-01 NOTE — PROGRESS NOTES
[place] [address]  [place], [address]  Phone [phone] Fax [phone]    Polysomnography Report  Patient Name Anna Craig. Account Number [de-identified]    1970 Referring Provider RENÉ Beltrán DABSM   Age/ Gender 51 years/M Interpreting physician RENÉ Beltrán DABSM   Neck circumference/  Mallampati classification 20.0 in/class 4 Night Technician Kamila Pacheco RRT, RPSGT   Milo score  Scoring Technician Arnaldo Chen CRT, RPSGT   Height 74.0 in Indications for the test Obstructive Sleep Apnea   Weight 273.0 lbs Test Titration Polysomnogram   BMI 35.0 Date of test 2021     Procedure  A Titration Polysomnogram was conducted on the night of 2021. The study was performed and scored per AASM guidelines. The following were monitored: frontal, central, and occipital EEG, electrooculogram (EOG), submentalis EMG, nasal and oral airflow, intranasal pressure, thoracic plethysmography, abdominal plethysmography, anterior tibialis EMG, electrocardiogram, body position, and positive airway pressure (PAP). Arterial oxygen saturation was monitored with a pulse oximeter. The study was scored utilizing 30 second epochs. Hypopneas were scored using per AASM definition VIII, D, 1B.     Sleep Scoring Data  Lights out 9:33:54 PM Sleep latency 2.4 min Time in N1 51.0 min N1% 13.3%   Lights on 4:27:18 AM WASO 28.0 min Time in N2 251.5 min N2% 65.7%   TIB/.4 min Sleep efficiency 93.2% Time in N3 3.0 min N3% 0.8%   .0 min REM latency 76.5 min Time in R 77.5 min R% 20.2%     Respiratory Events Summary   NREM REM Total   Hypopnea index 1.0 3.9 1.6   Apnea index 0.0 0.0 0.0   RERA index 0.0 0.0 0.0   AHI 1.0 3.9 1.6   RDI (AHI + RERA index) 1.0 3.9 1.6     Respiratory Events by Sleep Stage   Obstructive Apneas OA Index Central Apneas CA Index Mixed Apneas MA Index   Hypopneas H Index   RERAs   R Index   NREM 0 0.0 0 0.0 0 0.0 5 1.0 0 0.0   REM 0 0.0 0 0.0 0 0.0 5 3.9 0 0.0   Total 0 0.0 0 0.0 0 0.0 10 1.6 0 0.0     Respiratory Events by Body Position   Time (min) Obstructive Apneas OA Index Central Apneas CA Index Mixed Apneas MA Index   Hypopneas H Index   RERAs RERA  Index Total  AHI Total RDI   Supine 92.8  0 0.0 0 0.0 0 0.0 5 3.3 0 0.0 3.3 3.3   R/Supine                                           L/Supine                                           Right 318.2  0 0.0 0 0.0 0 0.0 5 1.0 0 0.0 1.0 1.0   Left                                           Prone                                           R/Prone                                           L/Prone                                           Up                                             Snoring  Snoring Rating (loudness 0-4) 1   Snoring Amount (% of total sleep time with snoring) 0%     Oximetry Data              Wake NREM REM TST   Average Saturation  96% 95% 95% 95%   Desaturation Index (#/hour)  0.6 3.1 1.1   Desaturation Max Duration (sec)  56.0 38.0 56.0   Minimum O2 Saturation    87%     Oximetry Distribution              WaKe REM NREM TOTAL %TIB   <90% (min) 0.0 0.0 0.1 0.1 0.0%   <85% (min) 0.0 0.0 0.0 0.0 0.0%   <80% (min) 0.0 0.0 0.0 0.0 0.0%   <75% (min) 0.0 0.0 0.0 0.0 0.0%   <70% (min) 0.0 0.0 0.0 0.0 0.0%   <60% (min) 0.0 0.0 0.0 0.0 0.0%   <50% (min) 0.0 0.0 0.0 0.0 0.0%   Fail    (min) 0.0 0.0 0.2 0.2      Respiratory Pattern   Present Absent Duration   Hypoventilation  ? N/A   Cheyne Bradshaw Respirations  ? N/A   Periodic Breathing  ? N/A     Heart Rate   Mean heart rate (bmp) Maximum heart rate (bmp)   During recording       102   During sleep 69.9 90     Heart Rhythm Summary  Normal sinus rhythm     Heart Rhythm Events  Type of events Present Absent Rate-Duration/Total Duration   Bradycardia        ? N/A   Asystole    ? N/A   Sinus Tachycardia During Sleep  ? N/A   Narrow Complex Tachycardia  ? N/A     Wide Complex Tachycardia  ?  N/A   Atrial Fibrillation ? N/A   Other Arrhythmias  ? N/A     Arousals   NREM REM Total Arousal Index   Spontaneous 8 1 11 1.7   Respiratory 5 5 10 1.6   Snore 0 0 0 0.0   Leg movement 0 4 4 0.6   Total 13 10 29 4.5     Periodic Limb Movement Data (legs unless otherwise noted)   Leg Movements PLMS PLMS with arousal   # of events 4 4 4   Index (#/hr TST) 0.6 0.6 0.6     Therapy Titration  CPAP TIB Sleep REM Apneas Hypopneas RERAs   Min    (min) (min) (min) CA# OA# MA# Index # Index # Index AHI RDI SpO2    8 22.5 22.5 0.0 0 0 0 0.0 1 2.7 0 0.0 2.7 2.7 92    9 53.9 53.9 0.2 0 0 0 0.0 1 1.1 0 0.0 1.1 1.1 90    10 111.7 109.2 20.2 0 0 0 0.0 2 1.1 0 0.0 1.1 1.1 91    11 75.7 52.7 34.9 0 0 0 0.0 1 1.1 0 0.0 1.1 1.1 93    12 61.2 60.2 0.0 0 0 0 0.0 1 1.0 0 0.0 1.0 1.0 87    13 3.6 3.6 2.7 0 0 0 0.0 2 33.3 0 0.0 33.3 33.3 91     14 3.2 3.2 3.2 0 0 0 0.0 2 37.5 0 0.0 37.5 37.5 92    15 77.0 75.5 15.0 0 0 0 0.0 0 0.0 0 0.0 0.0 0.0 91      Note:   The interpreting physician named above, reviewed this record in its entirety, including sleep staging, EMG activity, EEG, EKG, breathing parameters, oxygen saturation, body position, and behavior unless otherwise noted. The interpretation is based on this information in addition to available clinical history and physical examination data. Interpretation:     1. Obstructive sleep apnea. 2. Obesity. 3. Subjective hypersomnia. Recommendations:    1. CPAP at 15 cm water presser. 2. Weight loss and exercise. 3. Avoid risky activity such as driving if sleepy. 4. Discuss sleep hygiene with the patient. 5. Monitor PAP use and effectiveness.        Jose F Marquez MD, FCCP, [facility]

## 2021-10-01 NOTE — PROGRESS NOTES
[place] [address]  [place], [address]  Phone [phone] Fax [phone]     Sleep Study Technician Review    Patient Name:  Orville Ji. :   1970  Referring Provider: Tj Alonzo *    Brief History:  Orville Olguin is a 46 y.o. male with a history of GERDS, Intolerance of cpap who has returned for a titration study. Height: 6'4\"  Weight:  273 lbs  BMI:     33.23  Neck Circ:       20\"  MALLAMPATI: Type 4  ESS:        Type of Study: Titration  Time Stage Position Snore Hypopnea Obs Apnea Renetta Apnea PAP O2   2200 2 Right No No No No Cpap 9 RA   2300 REM Right No No No No Cpap 10 RA   2400 2 Right No No No No Cpap 10 RA   0100 REM Right No No No No Cpap 11 RA   0200 2 Right No No No No Cpap 12   RA   0300 2 Supine No No No No Cpap 12 RA   0400 2 Supine No No No No Cpap 15 RA   0500 REM Supine No No No No Cpap 15 RA                Summary: Pt has home oxygen. Pts study was performed on room air. Pt tolerated mask well. DME: Viky Dooley     Final PAP settings: Cpap 15  Mask Type: Full Face  Mask: Dreamwear   Mask Size: Medium    The study was reviewed briefly with Orville Ji. Marichuy Ellis He will be notified of the formal results and recommendations after the study is scored and interpreted. The report will be sent to his referring provider.     Technician:  KEVIN Garvin

## 2021-10-03 DIAGNOSIS — G47.33 OBSTRUCTIVE SLEEP APNEA SYNDROME: Primary | ICD-10-CM

## 2021-10-03 PROCEDURE — 95810 POLYSOM 6/> YRS 4/> PARAM: CPT | Performed by: INTERNAL MEDICINE

## 2021-10-18 ENCOUNTER — TELEPHONE (OUTPATIENT)
Dept: PULMONOLOGY | Age: 51
End: 2021-10-18

## 2021-10-29 RX ORDER — GLYBURIDE 5 MG/1
5 TABLET ORAL
Qty: 30 TABLET | Refills: 3 | Status: SHIPPED | OUTPATIENT
Start: 2021-10-29 | End: 2022-08-12

## 2021-10-29 NOTE — TELEPHONE ENCOUNTER
Spoke with patient and he is unsure which medication he is taking. He is not currently at home, but will check when he gets back home and let us know. Telephone encounter from 2/26/21 with Dr. Felipe Hodge indicates Glyburide no longer covered by insurance and was switched to Glipizide. Patient called back and states he has been receiving Glyburide and the last RX was dated 7/2021.

## 2021-10-29 NOTE — TELEPHONE ENCOUNTER
Glipizide is on active med list.  Glyburide looks like dcd.  I guess we need to clarify with the patient what he is actually taking since I have not refilled these

## 2021-12-22 ENCOUNTER — TELEPHONE (OUTPATIENT)
Dept: FAMILY MEDICINE CLINIC | Age: 51
End: 2021-12-22

## 2021-12-22 DIAGNOSIS — J10.1 INFLUENZA A: Primary | ICD-10-CM

## 2021-12-22 RX ORDER — OSELTAMIVIR PHOSPHATE 75 MG/1
75 CAPSULE ORAL 2 TIMES DAILY
Qty: 10 CAPSULE | Refills: 0 | Status: SHIPPED | OUTPATIENT
Start: 2021-12-22 | End: 2021-12-27

## 2021-12-22 NOTE — TELEPHONE ENCOUNTER
Patient called stating his son, son-in-law, and granddaughter have all tested positive for flu. He states he now feels like he is coming down with similar symptoms and is wanting to see about getting Tamiflu sent in as a precaution. Please advise.

## 2022-01-14 ENCOUNTER — VIRTUAL VISIT (OUTPATIENT)
Dept: FAMILY MEDICINE CLINIC | Age: 52
End: 2022-01-14
Payer: COMMERCIAL

## 2022-01-14 DIAGNOSIS — G47.33 OBSTRUCTIVE SLEEP APNEA SYNDROME: ICD-10-CM

## 2022-01-14 DIAGNOSIS — J01.40 ACUTE NON-RECURRENT PANSINUSITIS: ICD-10-CM

## 2022-01-14 DIAGNOSIS — R05.9 COUGH: Primary | ICD-10-CM

## 2022-01-14 PROCEDURE — 99213 OFFICE O/P EST LOW 20 MIN: CPT | Performed by: NURSE PRACTITIONER

## 2022-01-14 RX ORDER — AZITHROMYCIN 250 MG/1
250 TABLET, FILM COATED ORAL SEE ADMIN INSTRUCTIONS
Qty: 6 TABLET | Refills: 0 | Status: SHIPPED | OUTPATIENT
Start: 2022-01-14 | End: 2022-01-19

## 2022-01-14 RX ORDER — ALBUTEROL SULFATE 90 UG/1
2 AEROSOL, METERED RESPIRATORY (INHALATION) 4 TIMES DAILY PRN
Qty: 18 G | Refills: 5 | Status: SHIPPED | OUTPATIENT
Start: 2022-01-14 | End: 2022-09-05 | Stop reason: ALTCHOICE

## 2022-01-14 RX ORDER — BENZONATATE 200 MG/1
200 CAPSULE ORAL 3 TIMES DAILY PRN
Qty: 30 CAPSULE | Refills: 0 | Status: SHIPPED | OUTPATIENT
Start: 2022-01-14 | End: 2022-01-21

## 2022-01-14 ASSESSMENT — ENCOUNTER SYMPTOMS
SHORTNESS OF BREATH: 1
WHEEZING: 0
ABDOMINAL PAIN: 0
RHINORRHEA: 1
SORE THROAT: 0
DIARRHEA: 0
COUGH: 1
NAUSEA: 0
CHEST TIGHTNESS: 0

## 2022-01-14 NOTE — PROGRESS NOTES
2022    TELEHEALTH EVALUATION -- Audio/Visual (During TSZFJ-07 public health emergency)    HPI:    Huma Mosher. (:  1970) has requested an audio/video evaluation for the following concern(s):    VV per Doxy. He has had cough and congestion, rhinitis, postnasal drainage for 3 days. No fever or chills. He has had sinus pressure. He feels drainage is triggering the cough. He has chronic shortness of breath since his COVID infection in 2021. He uses CPAP during the day as needed with oxygen and at night. He has been followed by pulmonology. He has been taking Mucinex DM, OTC sinus medications with no improvement. He has had both COVID vaccines but has not had his booster. Review of Systems   Constitutional: Negative for chills and fever. HENT: Positive for congestion, postnasal drip and rhinorrhea. Negative for ear pain and sore throat. Respiratory: Positive for cough and shortness of breath. Negative for chest tightness and wheezing. Cardiovascular: Negative for chest pain. Gastrointestinal: Negative for abdominal pain, diarrhea and nausea. Musculoskeletal: Negative for arthralgias and myalgias. Skin: Negative for rash. Prior to Visit Medications    Medication Sig Taking? Authorizing Provider   albuterol sulfate HFA (VENTOLIN HFA) 108 (90 Base) MCG/ACT inhaler Inhale 2 puffs into the lungs 4 times daily as needed for Wheezing Yes DENIS [place]   azithromycin (ZITHROMAX) 250 MG tablet Take 1 tablet by mouth See Admin Instructions for 5 days 500mg on day 1 followed by 250mg on days 2 - 5 Yes DENIS Sawant   benzonatate (TESSALON) 200 MG capsule Take 1 capsule by mouth 3 times daily as needed for Cough Yes DENIS [place]   glyBURIDE (DIABETA) 5 MG tablet TAKE 1 TABLET BY MOUTH DAILY (WITH BREAKFAST)  DENIS Lux   clotrimazole-betamethasone (LOTRISONE) 1-0.05 % cream Apply topically 2 times daily.   Kaushik Youssef DENIS   levothyroxine (SYNTHROID) 100 MCG tablet Take 1 tablet by mouth Daily  DENIS Jones   chlorproMAZINE (THORAZINE) 50 MG tablet Take 1 tablet by mouth nightly  DENIS Jones   hydroCHLOROthiazide (HYDRODIURIL) 25 MG tablet Take 1 tablet by mouth every morning  DENIS Jones   glipiZIDE (GLUCOTROL) 5 MG tablet Take 1 tablet by mouth daily  Hannah Valentin MD   ondansetron (ZOFRAN) 4 MG tablet Take 1 tablet by mouth 3 times daily as needed for Nausea or Vomiting  Hannah Valentin MD   rizatriptan (MAXALT-MLT) 10 MG disintegrating tablet Take 1 tablet by mouth once as needed for Migraine May repeat in 2 hours if needed  Hannah Valentin MD   losartan (COZAAR) 25 MG tablet Take 1 tablet by mouth daily  Hannah Valentin MD   amitriptyline (ELAVIL) 25 MG tablet TAKE 1 TABLET BY MOUTH NIGHTLY  Hannah Valentin MD       Social History     Tobacco Use    Smoking status: Never Smoker    Smokeless tobacco: Former User     Types: Chew   Vaping Use    Vaping Use: Never used   Substance Use Topics    Alcohol use: No    Drug use: No        No Known Allergies,   Past Medical History:   Diagnosis Date    Acid reflux     Adenomatous goiter     2013 left thyroidectomy  Dr Nasra Jose Arthralgia of multiple sites     Arthritis     osteo    Chronic back pain     Effusion of patella     Gastric ulcer     Knee pain     Medial meniscus tear     Mixed hyperlipidemia     Obesity     Pain of right hand     Postoperative hypothyroidism     left lobectomy, Dr Nasra Jose Prediabetes     Prolonged emergence from general anesthesia     fights upon emergence; works in [employer], woke feeling he was at work and being threatened.     Simple goiter     Solitary thyroid nodule     Thyroid disease    ,   Past Surgical History:   Procedure Laterality Date    CHOLECYSTECTOMY, LAPAROSCOPIC      HERNIA REPAIR      umbilical    INGUINAL HERNIA REPAIR Left     age 10    KNEE ARTHROSCOPY Right x2    KNEE CARTILAGE SURGERY Left 5/12/2016    ARTHROSCOPY PARTIAL MEDIAL MENISCECTOMY KNEE performed by Sofy Sweeney DO at [address] [place] [address]      wisdom teeth removed    THYROIDECTOMY, PARTIAL      Left thyroidectomy, Dr. Lisa Avalos, 2013   [address]     ,   Social History     Tobacco Use    Smoking status: Never Smoker    Smokeless tobacco: Former User     Types: Chew   Vaping Use    Vaping Use: Never used   Substance Use Topics    Alcohol use: No    Drug use: No   ,   Family History   Problem Relation Age of Onset    Other Mother         hypothyroidism    Heart Disease Mother     Diabetes Mother     Heart Attack Father     Breast Cancer Maternal Aunt     Cancer Paternal Aunt        PHYSICAL EXAMINATION:  [ INSTRUCTIONS:  \"[x]\" Indicates a positive item  \"[]\" Indicates a negative item  -- DELETE ALL ITEMS NOT EXAMINED]  Vital Signs: (As obtained by patient/caregiver or practitioner observation)    Blood pressure-  Heart rate-    Respiratory rate-    Temperature-  Pulse oximetry-     Constitutional: [x] Appears well-developed and well-nourished [x] No apparent distress      [] Abnormal-   Mental status  [x] Alert and awake  [x] Oriented to person/place/time [x]Able to follow commands      Eyes:  EOM    [x]  Normal  [] Abnormal-  Sclera  [x]  Normal  [] Abnormal -         Discharge [x]  None visible  [] Abnormal -    HENT:   [x] Normocephalic, atraumatic.   [] Abnormal   [] Mouth/Throat: Mucous membranes are moist.     External Ears [] Normal  [] Abnormal-     Neck: [] No visualized mass     Pulmonary/Chest: [x] Respiratory effort normal.  [x] No visualized signs of difficulty breathing or respiratory distress        [] Abnormal-      Musculoskeletal:   [] Normal gait with no signs of ataxia         [x] Normal range of motion of neck        [] Abnormal-       Neurological:        [x] No Facial Asymmetry (Cranial nerve 7 motor function) (limited exam to video visit)          [x] No gaze palsy        [] Abnormal-         Skin:        [x] No significant exanthematous lesions or discoloration noted on facial skin         [] Abnormal-            Psychiatric:       [x] Normal Affect [x] No Hallucinations        [] Abnormal-     Other pertinent observable physical exam findings-     ASSESSMENT/PLAN:  1. Acute non-recurrent pansinusitis  -COVID swab  -Treat empirically Z-Chacho to cover possible bacterial component. - azithromycin (ZITHROMAX) 250 MG tablet; Take 1 tablet by mouth See Admin Instructions for 5 days 500mg on day 1 followed by 250mg on days 2 - 5  Dispense: 6 tablet; Refill: 0    2. Cough  -Mucinex  to 1200 mg twice daily  -Tessalon Perles 200 mg 3 times daily as needed for cough  -Albuterol inhaler as needed for shortness of breath/wheeze  - COVID-19    3. Obstructive sleep apnea syndrome  -Continue CPAP with O2      No follow-ups on file. Germaine Gallegos., was evaluated through a synchronous (real-time) audio-video encounter. The patient (or guardian if applicable) is aware that this is a billable service. Verbal consent to proceed has been obtained within the past 12 months. The visit was conducted pursuant to the emergency declaration under the [address] [place] [address] authority and the [employer] General Act. Patient identification was verified, and a caregiver was present when appropriate. The patient was located in a state where the provider was credentialed to provide care. Total time spent on this encounter: Not billed by time    --DENIS Eilas on 1/14/2022 at 3:17 PM    An electronic signature was used to authenticate this note.

## 2022-01-15 LAB — SARS-COV-2, PCR: NOT DETECTED

## 2022-02-07 DIAGNOSIS — R60.1 GENERALIZED EDEMA: ICD-10-CM

## 2022-02-07 RX ORDER — HYDROCHLOROTHIAZIDE 25 MG/1
25 TABLET ORAL EVERY MORNING
Qty: 30 TABLET | Refills: 1 | Status: SHIPPED | OUTPATIENT
Start: 2022-02-07 | End: 2022-09-05 | Stop reason: ALTCHOICE

## 2022-02-25 ENCOUNTER — TELEPHONE (OUTPATIENT)
Dept: FAMILY MEDICINE CLINIC | Age: 52
End: 2022-02-25

## 2022-04-12 ENCOUNTER — APPOINTMENT (OUTPATIENT)
Dept: CT IMAGING | Facility: HOSPITAL | Age: 52
End: 2022-04-12

## 2022-04-12 ENCOUNTER — HOSPITAL ENCOUNTER (EMERGENCY)
Facility: HOSPITAL | Age: 52
Discharge: HOME | End: 2022-04-12
Attending: EMERGENCY MEDICINE | Admitting: EMERGENCY MEDICINE

## 2022-04-12 ENCOUNTER — TELEPHONE (OUTPATIENT)
Dept: FAMILY MEDICINE CLINIC | Age: 52
End: 2022-04-12

## 2022-04-12 VITALS
HEART RATE: 71 BPM | RESPIRATION RATE: 17 BRPM | DIASTOLIC BLOOD PRESSURE: 74 MMHG | TEMPERATURE: 97.6 F | BODY MASS INDEX: 33.85 KG/M2 | HEIGHT: 76 IN | OXYGEN SATURATION: 97 % | WEIGHT: 278 LBS | SYSTOLIC BLOOD PRESSURE: 121 MMHG

## 2022-04-12 DIAGNOSIS — R42 VERTIGO: Primary | ICD-10-CM

## 2022-04-12 LAB
ALBUMIN SERPL-MCNC: 4 G/DL (ref 3.5–5.2)
ALBUMIN/GLOB SERPL: 1.5 G/DL
ALP SERPL-CCNC: 85 U/L (ref 39–117)
ALT SERPL W P-5'-P-CCNC: 27 U/L (ref 1–41)
ANION GAP SERPL CALCULATED.3IONS-SCNC: 12 MMOL/L (ref 5–15)
AST SERPL-CCNC: 24 U/L (ref 1–40)
BASOPHILS # BLD AUTO: 0.03 10*3/MM3 (ref 0–0.2)
BASOPHILS NFR BLD AUTO: 0.4 % (ref 0–1.5)
BILIRUB SERPL-MCNC: 0.4 MG/DL (ref 0–1.2)
BUN SERPL-MCNC: 22 MG/DL (ref 6–20)
BUN/CREAT SERPL: 29.3 (ref 7–25)
CALCIUM SPEC-SCNC: 9 MG/DL (ref 8.6–10.5)
CHLORIDE SERPL-SCNC: 102 MMOL/L (ref 98–107)
CO2 SERPL-SCNC: 22 MMOL/L (ref 22–29)
CREAT SERPL-MCNC: 0.75 MG/DL (ref 0.76–1.27)
DEPRECATED RDW RBC AUTO: 41.7 FL (ref 37–54)
EGFRCR SERPLBLD CKD-EPI 2021: 109.3 ML/MIN/1.73
EOSINOPHIL # BLD AUTO: 0.03 10*3/MM3 (ref 0–0.4)
EOSINOPHIL NFR BLD AUTO: 0.4 % (ref 0.3–6.2)
ERYTHROCYTE [DISTWIDTH] IN BLOOD BY AUTOMATED COUNT: 13.8 % (ref 12.3–15.4)
GLOBULIN UR ELPH-MCNC: 2.7 GM/DL
GLUCOSE SERPL-MCNC: 273 MG/DL (ref 65–99)
HCT VFR BLD AUTO: 45.6 % (ref 37.5–51)
HGB BLD-MCNC: 15 G/DL (ref 13–17.7)
HOLD SPECIMEN: NORMAL
IMM GRANULOCYTES # BLD AUTO: 0.05 10*3/MM3 (ref 0–0.05)
IMM GRANULOCYTES NFR BLD AUTO: 0.6 % (ref 0–0.5)
LYMPHOCYTES # BLD AUTO: 1.06 10*3/MM3 (ref 0.7–3.1)
LYMPHOCYTES NFR BLD AUTO: 12.5 % (ref 19.6–45.3)
MAGNESIUM SERPL-MCNC: 1.9 MG/DL (ref 1.6–2.6)
MCH RBC QN AUTO: 27.3 PG (ref 26.6–33)
MCHC RBC AUTO-ENTMCNC: 32.9 G/DL (ref 31.5–35.7)
MCV RBC AUTO: 83.1 FL (ref 79–97)
MONOCYTES # BLD AUTO: 0.32 10*3/MM3 (ref 0.1–0.9)
MONOCYTES NFR BLD AUTO: 3.8 % (ref 5–12)
NEUTROPHILS NFR BLD AUTO: 6.96 10*3/MM3 (ref 1.7–7)
NEUTROPHILS NFR BLD AUTO: 82.3 % (ref 42.7–76)
NRBC BLD AUTO-RTO: 0 /100 WBC (ref 0–0.2)
PLATELET # BLD AUTO: 228 10*3/MM3 (ref 140–450)
PMV BLD AUTO: 9.5 FL (ref 6–12)
POTASSIUM SERPL-SCNC: 4.5 MMOL/L (ref 3.5–5.2)
PROT SERPL-MCNC: 6.7 G/DL (ref 6–8.5)
RBC # BLD AUTO: 5.49 10*6/MM3 (ref 4.14–5.8)
SODIUM SERPL-SCNC: 136 MMOL/L (ref 136–145)
WBC NRBC COR # BLD: 8.45 10*3/MM3 (ref 3.4–10.8)
WHOLE BLOOD HOLD SPECIMEN: NORMAL
WHOLE BLOOD HOLD SPECIMEN: NORMAL

## 2022-04-12 PROCEDURE — 83735 ASSAY OF MAGNESIUM: CPT | Performed by: EMERGENCY MEDICINE

## 2022-04-12 PROCEDURE — 80053 COMPREHEN METABOLIC PANEL: CPT | Performed by: EMERGENCY MEDICINE

## 2022-04-12 PROCEDURE — 96375 TX/PRO/DX INJ NEW DRUG ADDON: CPT

## 2022-04-12 PROCEDURE — 99284 EMERGENCY DEPT VISIT MOD MDM: CPT

## 2022-04-12 PROCEDURE — 96374 THER/PROPH/DIAG INJ IV PUSH: CPT

## 2022-04-12 PROCEDURE — 25010000002 ONDANSETRON PER 1 MG: Performed by: EMERGENCY MEDICINE

## 2022-04-12 PROCEDURE — 93010 ELECTROCARDIOGRAM REPORT: CPT | Performed by: INTERNAL MEDICINE

## 2022-04-12 PROCEDURE — 85025 COMPLETE CBC W/AUTO DIFF WBC: CPT | Performed by: EMERGENCY MEDICINE

## 2022-04-12 PROCEDURE — 93005 ELECTROCARDIOGRAM TRACING: CPT | Performed by: EMERGENCY MEDICINE

## 2022-04-12 PROCEDURE — 70450 CT HEAD/BRAIN W/O DYE: CPT

## 2022-04-12 PROCEDURE — 25010000002 METOCLOPRAMIDE PER 10 MG: Performed by: EMERGENCY MEDICINE

## 2022-04-12 RX ORDER — METOCLOPRAMIDE 5 MG/1
10 TABLET ORAL 3 TIMES DAILY PRN
Qty: 30 TABLET | Refills: 0 | Status: SHIPPED | OUTPATIENT
Start: 2022-04-12

## 2022-04-12 RX ORDER — PREDNISONE 20 MG/1
20 TABLET ORAL 2 TIMES DAILY
Qty: 10 TABLET | Refills: 0 | Status: SHIPPED | OUTPATIENT
Start: 2022-04-12

## 2022-04-12 RX ORDER — SODIUM CHLORIDE 0.9 % (FLUSH) 0.9 %
10 SYRINGE (ML) INJECTION AS NEEDED
Status: DISCONTINUED | OUTPATIENT
Start: 2022-04-12 | End: 2022-04-12 | Stop reason: HOSPADM

## 2022-04-12 RX ORDER — ONDANSETRON 4 MG/1
4 TABLET, ORALLY DISINTEGRATING ORAL EVERY 4 HOURS PRN
Qty: 10 TABLET | Refills: 0 | Status: SHIPPED | OUTPATIENT
Start: 2022-04-12

## 2022-04-12 RX ORDER — ONDANSETRON 2 MG/ML
4 INJECTION INTRAMUSCULAR; INTRAVENOUS ONCE
Status: COMPLETED | OUTPATIENT
Start: 2022-04-12 | End: 2022-04-12

## 2022-04-12 RX ORDER — METOCLOPRAMIDE HYDROCHLORIDE 5 MG/ML
10 INJECTION INTRAMUSCULAR; INTRAVENOUS ONCE
Status: COMPLETED | OUTPATIENT
Start: 2022-04-12 | End: 2022-04-12

## 2022-04-12 RX ADMIN — METOCLOPRAMIDE 10 MG: 5 INJECTION, SOLUTION INTRAMUSCULAR; INTRAVENOUS at 12:22

## 2022-04-12 RX ADMIN — ONDANSETRON HYDROCHLORIDE 4 MG: 2 SOLUTION INTRAMUSCULAR; INTRAVENOUS at 12:21

## 2022-04-12 NOTE — TELEPHONE ENCOUNTER
Patient left  with complaints of being very dizzy & Nauseated upon standing up or moving sides on the bed. Called the patient back to get more information. Left , waiting on return call.

## 2022-04-12 NOTE — ED PROVIDER NOTES
Subjective   Patient says that he woke up this morning with severe dizziness.  When he first tried to get up he could not because of it micrograms been around like his has been around.  He has nauseated and vomited several times.  Every time he tries to get up ready to move his head it makes him dizzy again makes much worse.  He keeps his eyes closed but that seems to make a little bit better.  He denies any head trauma or or loss of consciousness.  He denies any ringing in his ears.  He does say he has a headache now but that started after the dizziness started.      History provided by:  Patient   used: No    Dizziness  Quality:  Head spinning and room spinning  Severity:  Severe  Onset quality:  Sudden  Duration:  4 hours  Timing:  Constant  Progression:  Unchanged  Chronicity:  New  Context: eye movement and head movement    Context: not with bowel movement, not with ear pain, not with inactivity, not with loss of consciousness, not with physical activity and not when standing up    Relieved by:  Nothing  Worsened by:  Nothing  Ineffective treatments:  None tried  Associated symptoms: headaches, nausea and vomiting    Associated symptoms: no blood in stool, no diarrhea, no hearing loss, no palpitations, no syncope, no vision changes and no weakness    Risk factors: no anemia, no heart disease, no hx of stroke, no hx of vertigo, no Meniere's disease, no multiple medications and no new medications        Review of Systems   Constitutional: Negative.    HENT: Negative for hearing loss.    Respiratory: Negative.    Cardiovascular: Negative.  Negative for palpitations and syncope.   Gastrointestinal: Positive for nausea and vomiting. Negative for blood in stool and diarrhea.   Genitourinary: Negative.    Musculoskeletal: Negative.    Skin: Negative.    Neurological: Positive for dizziness and headaches. Negative for weakness.   Psychiatric/Behavioral: Negative.    All other systems reviewed and  are negative.      Past Medical History:   Diagnosis Date   • Acute pansinusitis 5/19/2017   • Goiter    • Headache    • Hyperlipidemia    • Hypertension    • Hypothyroidism    • Sleep apnea     uses O2 at night       No Known Allergies    Past Surgical History:   Procedure Laterality Date   • CHOLECYSTECTOMY     • HERNIA REPAIR     • KNEE SURGERY     • THYROIDECTOMY Left        Family History   Problem Relation Age of Onset   • Diabetes Mother    • Arrhythmia Mother    • Heart disease Father    • Heart attack Father    • Cancer Maternal Aunt    • Cancer Maternal Uncle    • Cancer Paternal Aunt    • Multiple sclerosis Sister    • Hypertension Brother        Social History     Socioeconomic History   • Marital status:    Tobacco Use   • Smoking status: Never Smoker   • Smokeless tobacco: Never Used   Vaping Use   • Vaping Use: Never used   Substance and Sexual Activity   • Alcohol use: Not Currently   • Drug use: Never   • Sexual activity: Defer       Prior to Admission medications    Medication Sig Start Date End Date Taking? Authorizing Provider   albuterol (PROVENTIL) (2.5 MG/3ML) 0.083% nebulizer solution Take 2.5 mg by nebulization Every 4 (Four) Hours As Needed for Wheezing. 2/20/21   Brandi Whitfield APRN   amitriptyline (ELAVIL) 25 MG tablet Take 25 mg by mouth As Needed for Sleep.    ProviderLauryn MD   benzonatate (TESSALON) 200 MG capsule Take 200 mg by mouth 3 (Three) Times a Day As Needed for Cough.    ProviderLauryn MD   chlorproMAZINE (THORAZINE) 50 MG tablet Take 50 mg by mouth Every Night. 8/24/21   Lauryn Martins MD   clotrimazole-betamethasone (LOTRISONE) 1-0.05 % cream As Needed. 9/10/21   Lauryn Martins MD   glyburide (DIAbeta) 5 MG tablet Take 5 mg by mouth Daily With Breakfast. 9/24/20   Emergency, Nurse Chloe, RN   hydroCHLOROthiazide (HYDRODIURIL) 25 MG tablet Take 25 mg by mouth Every Morning. 9/2/21   Laruyn Martins MD   HYDROcodone-acetaminophen  (NORCO) 7.5-325 MG per tablet Take  by mouth Every 6 (Six) Hours.    Provider, MD Lauryn   levothyroxine (SYNTHROID, LEVOTHROID) 100 MCG tablet TAKE ONE TABLET EVERY DAY 6/11/18   Link Barron PA   losartan (COZAAR) 25 MG tablet Take 25 mg by mouth As Needed. When gets a headache 10/26/20   Emergency, Nurse ARMANDO Corona   ondansetron (ZOFRAN) 4 MG tablet 4 mg Every 8 (Eight) Hours As Needed. 10/16/20   Emergency, Nurse ARMANDO Corona   rizatriptan MLT (MAXALT-MLT) 10 MG disintegrating tablet Place 10 mg on the tongue 1 (One) Time As Needed. 10/12/20   Emergency, Nurse Epic, RN       Medications   sodium chloride 0.9 % flush 10 mL (has no administration in time range)   sodium chloride 0.9 % flush 10 mL (has no administration in time range)   ondansetron (ZOFRAN) injection 4 mg (4 mg Intravenous Given 4/12/22 1221)   metoclopramide (REGLAN) injection 10 mg (10 mg Intravenous Given 4/12/22 1222)       Vitals:    04/12/22 1346   BP: 114/76   Pulse: 66   Resp:    Temp:    SpO2: 92%         Objective   Physical Exam  Vitals and nursing note reviewed.   Constitutional:       Appearance: Normal appearance.   HENT:      Head: Normocephalic and atraumatic.      Mouth/Throat:      Mouth: Mucous membranes are moist.      Pharynx: Oropharynx is clear.   Eyes:      Extraocular Movements: Extraocular movements intact.      Pupils: Pupils are equal, round, and reactive to light.   Cardiovascular:      Rate and Rhythm: Normal rate and regular rhythm.   Pulmonary:      Effort: Pulmonary effort is normal.   Abdominal:      Palpations: Abdomen is soft.      Tenderness: There is no abdominal tenderness.   Musculoskeletal:         General: Normal range of motion.      Cervical back: Normal range of motion and neck supple.   Skin:     General: Skin is warm and dry.   Neurological:      General: No focal deficit present.      Mental Status: He is alert and oriented to person, place, and time.   Psychiatric:         Mood and Affect:  Mood normal.         Behavior: Behavior normal.         Procedures         Lab Results (last 24 hours)     Procedure Component Value Units Date/Time    [place] Blood Culture Bottle Set [[id number]] Collected: 04/12/22 1222    Specimen: Blood from Arm, Right Updated: 04/12/22 1333     Extra Tube Hold for add-ons.     Comment: Auto resulted.       CBC Auto Differential [[id number]]  (Abnormal) Collected: 04/12/22 1222    Specimen: Blood Updated: 04/12/22 1240     WBC 8.45 10*3/mm3      RBC 5.49 10*6/mm3      Hemoglobin 15.0 g/dL      Hematocrit 45.6 %      MCV 83.1 fL      MCH 27.3 pg      MCHC 32.9 g/dL      RDW 13.8 %      RDW-SD 41.7 fl      MPV 9.5 fL      Platelets 228 10*3/mm3      Neutrophil % 82.3 %      Lymphocyte % 12.5 %      Monocyte % 3.8 %      Eosinophil % 0.4 %      Basophil % 0.4 %      Immature Grans % 0.6 %      Neutrophils, Absolute 6.96 10*3/mm3      Lymphocytes, Absolute 1.06 10*3/mm3      Monocytes, Absolute 0.32 10*3/mm3      Eosinophils, Absolute 0.03 10*3/mm3      Basophils, Absolute 0.03 10*3/mm3      Immature Grans, Absolute 0.05 10*3/mm3      nRBC 0.0 /100 WBC     Comprehensive Metabolic Panel [[id number]]  (Abnormal) Collected: 04/12/22 1222    Specimen: Blood Updated: 04/12/22 1258     Glucose 273 mg/dL      BUN 22 mg/dL      Creatinine 0.75 mg/dL      Sodium 136 mmol/L      Potassium 4.5 mmol/L      Chloride 102 mmol/L      CO2 22.0 mmol/L      Calcium 9.0 mg/dL      Total Protein 6.7 g/dL      Albumin 4.00 g/dL      ALT (SGPT) 27 U/L      AST (SGOT) 24 U/L      Alkaline Phosphatase 85 U/L      Total Bilirubin 0.4 mg/dL      Globulin 2.7 gm/dL      A/G Ratio 1.5 g/dL      BUN/Creatinine Ratio 29.3     Anion Gap 12.0 mmol/L      eGFR 109.3 mL/min/1.73      Comment: National Kidney Foundation and American Society of Nephrology (ASN) Task Force recommended calculation based on the Chronic Kidney Disease Epidemiology Collaboration (CKD-EPI) equation refit without adjustment for race.        Narrative:      GFR Normal >60  Chronic Kidney Disease <60  Kidney Failure <15      Magnesium [[id number]]  (Normal) Collected: 04/12/22 1222    Specimen: Blood Updated: 04/12/22 1258     Magnesium 1.9 mg/dL           CT Head Without Contrast   Final Result   Impression:     1. No acute intracranial process.   This report was finalized on 04/12/2022 12:56 by Dr Idris Elaine, .          ED Course  ED Course as of 04/12/22 1405   Tue Apr 12, 2022   1403 Tell the patient his testing is all okay except for an elevated glucose.  There is no signs of any problems in his head.  I think this is vertigo and we will treated as such.  He has better with the medication we have given him here though not completely well yet.  He now can open his eyes and talk to me.  He is discharged in stable condition. [TR]      ED Course User Index  [TR] Sandro Monteiro Jr., MD          MDM  Number of Diagnoses or Management Options  Vertigo: new and requires workup     Amount and/or Complexity of Data Reviewed  Clinical lab tests: ordered and reviewed  Tests in the radiology section of CPT®: ordered and reviewed  Tests in the medicine section of CPT®: ordered and reviewed    Risk of Complications, Morbidity, and/or Mortality  Presenting problems: moderate  Diagnostic procedures: moderate  Management options: moderate    Patient Progress  Patient progress: stable      Final diagnoses:   Vertigo          Sandro Monteiro Jr., MD  04/12/22 1404       Sandro Monteiro Jr., MD  04/12/22 140

## 2022-04-14 ENCOUNTER — TELEPHONE (OUTPATIENT)
Dept: FAMILY MEDICINE CLINIC | Age: 52
End: 2022-04-14

## 2022-04-14 NOTE — TELEPHONE ENCOUNTER
----- Message from Mayi Reed sent at 4/13/2022  2:56 PM CDT -----  Subject: Message to Provider    QUESTIONS  Information for Provider? pt is requesting to get a call back so he can   get released for work and whether his ED follow up should be VV or in   person. please advise.  ---------------------------------------------------------------------------  --------------  CALL BACK INFO  What is the best way for the office to contact you? OK to leave message on   voicemail  Preferred Call Back Phone Number? [phone]  ---------------------------------------------------------------------------  --------------  SCRIPT ANSWERS  Relationship to Patient?  Self

## 2022-04-14 NOTE — TELEPHONE ENCOUNTER
----- Message from Dianna Miranda sent at 4/13/2022  2:18 PM CDT -----  Subject: Appointment Request    Reason for Call: Routine ED Follow Up Visit    QUESTIONS  Type of Appointment? Established Patient  Reason for appointment request? Available appointments did not meet   patient need  Additional Information for Provider? Patient is in need of an appt for ed   follow up. Please contact patient regarding this message. ---------------------------------------------------------------------------  --------------  "[employer]" [facility] INFO  What is the best way for the office to contact you? OK to leave message on   voicemail  Preferred Call Back Phone Number? [phone]  ---------------------------------------------------------------------------  --------------  SCRIPT ANSWERS  Relationship to Patient? Self  (Patient requests to see provider urgently. )? No  Do you have any questions for your primary care provider that need to be   answered prior to your appointment? No  Have you been diagnosed with, awaiting test results for, or told that you   are suspected of having COVID-19 (Coronavirus)? (If patient has tested   negative or was tested as a requirement for work, school, or travel and   not based on symptoms, answer no)? No  Within the past 10 days have you developed any of the following symptoms   (answer no if symptoms have been present longer than 10 days or began   more than 10 days ago)? Fever or Chills, Cough, Shortness of breath or   difficulty breathing, Loss of taste or smell, Sore throat, Nasal   congestion, Sneezing or runny nose, Fatigue or generalized body aches   (answer no if pain is specific to a body part e.g. back pain), Diarrhea,   Headache? No  Have you had close contact with someone with COVID-19 in the last 7 days? No  (Service Expert  click yes below to proceed with [employer] As Usual   Scheduling)?  Yes

## 2022-04-15 LAB
QT INTERVAL: 428 MS
QTC INTERVAL: 452 MS

## 2022-05-11 ENCOUNTER — TELEPHONE (OUTPATIENT)
Dept: FAMILY MEDICINE CLINIC | Age: 52
End: 2022-05-11

## 2022-05-11 ENCOUNTER — OFFICE VISIT (OUTPATIENT)
Dept: FAMILY MEDICINE CLINIC | Age: 52
End: 2022-05-11
Payer: COMMERCIAL

## 2022-05-11 VITALS
WEIGHT: 288.6 LBS | HEART RATE: 84 BPM | DIASTOLIC BLOOD PRESSURE: 70 MMHG | HEIGHT: 76 IN | RESPIRATION RATE: 18 BRPM | SYSTOLIC BLOOD PRESSURE: 134 MMHG | TEMPERATURE: 97.6 F | BODY MASS INDEX: 35.14 KG/M2 | OXYGEN SATURATION: 98 %

## 2022-05-11 DIAGNOSIS — E11.9 TYPE 2 DIABETES MELLITUS WITHOUT COMPLICATION, WITHOUT LONG-TERM CURRENT USE OF INSULIN (HCC): Primary | ICD-10-CM

## 2022-05-11 DIAGNOSIS — R11.0 NAUSEA: ICD-10-CM

## 2022-05-11 DIAGNOSIS — R42 DIZZINESS: ICD-10-CM

## 2022-05-11 DIAGNOSIS — H66.002 NON-RECURRENT ACUTE SUPPURATIVE OTITIS MEDIA OF LEFT EAR WITHOUT SPONTANEOUS RUPTURE OF TYMPANIC MEMBRANE: ICD-10-CM

## 2022-05-11 LAB — HBA1C MFR BLD: 9.8 %

## 2022-05-11 PROCEDURE — 99214 OFFICE O/P EST MOD 30 MIN: CPT | Performed by: CLINICAL NURSE SPECIALIST

## 2022-05-11 PROCEDURE — 83036 HEMOGLOBIN GLYCOSYLATED A1C: CPT | Performed by: CLINICAL NURSE SPECIALIST

## 2022-05-11 PROCEDURE — 3046F HEMOGLOBIN A1C LEVEL >9.0%: CPT | Performed by: CLINICAL NURSE SPECIALIST

## 2022-05-11 RX ORDER — METOCLOPRAMIDE 10 MG/1
10 TABLET ORAL 3 TIMES DAILY PRN
Qty: 30 TABLET | Refills: 0 | Status: SHIPPED | OUTPATIENT
Start: 2022-05-11 | End: 2022-09-05 | Stop reason: ALTCHOICE

## 2022-05-11 RX ORDER — PREDNISONE 20 MG/1
20 TABLET ORAL 2 TIMES DAILY
Qty: 10 TABLET | Refills: 0 | Status: SHIPPED | OUTPATIENT
Start: 2022-05-11 | End: 2022-05-16

## 2022-05-11 RX ORDER — FLUTICASONE PROPIONATE 50 MCG
2 SPRAY, SUSPENSION (ML) NASAL DAILY
Qty: 16 G | Refills: 0 | Status: SHIPPED | OUTPATIENT
Start: 2022-05-11 | End: 2022-09-05 | Stop reason: ALTCHOICE

## 2022-05-11 RX ORDER — AMOXICILLIN AND CLAVULANATE POTASSIUM 875; 125 MG/1; MG/1
1 TABLET, FILM COATED ORAL 2 TIMES DAILY
Qty: 20 TABLET | Refills: 0 | Status: SHIPPED | OUTPATIENT
Start: 2022-05-11 | End: 2022-05-21

## 2022-05-11 ASSESSMENT — PATIENT HEALTH QUESTIONNAIRE - PHQ9
SUM OF ALL RESPONSES TO PHQ9 QUESTIONS 1 & 2: 1
SUM OF ALL RESPONSES TO PHQ QUESTIONS 1-9: 1
1. LITTLE INTEREST OR PLEASURE IN DOING THINGS: 1
SUM OF ALL RESPONSES TO PHQ QUESTIONS 1-9: 1
SUM OF ALL RESPONSES TO PHQ QUESTIONS 1-9: 1
2. FEELING DOWN, DEPRESSED OR HOPELESS: 0
SUM OF ALL RESPONSES TO PHQ QUESTIONS 1-9: 1

## 2022-05-11 NOTE — TELEPHONE ENCOUNTER
Patient called in and was transferred from the [facility] ([facility]) on nurse triage line. Patient reports he feels like his equilibrium is off. He stated he has some dizziness & nausea, he said he come home last night from work and had a hard time getting into his home. He said he took a nausea medication & laid down and went to sleep. He woke up feeling the same way this morning and he took another nausea tablet. Patient stated he feels the same way he did last time he was sick and seen. His last appointment was a virtual visit back in January. We attempted to provide him the appointment at 11:45 and he said he could not make that because he was at work. He asked for a later appointment so he is scheduled for 3:00 and he was unsure if he could make that appointment. I informed him to let us know if he could not but recommended if he was unable to be seen his primary provider, he needed be seen in urgent care or emergency room.

## 2022-05-11 NOTE — PROGRESS NOTES
SUBJECTIVE:  Irina Riley is a 46 y.o. who presents today for Nausea, Dizziness, and Headache      HPI    Arden presents today for acute visit. He had presented to ER on 4/12 with severe dizziness and nausea. ER work up with negative CT and labs other than his glucose was over 200. He was treated with steroids and reglan- symptoms resolved. His symptoms returned again last night upon arising from chair. They continue this morning. He is dizzy with position change and movement of head. Some nausea, no vomiting. Has had some sinus drainage and congestion with fullness in ears. Has been prediabetic, on glipizide and then glyburide due to insurance coverage. Noted his hyperglycemia in ER, a1c today is checked at 9.8    Past Medical History:   Diagnosis Date    Acid reflux     Adenomatous goiter     2013 left thyroidectomy  Dr Wesley Boyce Arthralgia of multiple sites     Arthritis     osteo    Chronic back pain     Effusion of patella     Gastric ulcer     Knee pain     Medial meniscus tear     Mixed hyperlipidemia     Obesity     Pain of right hand     Postoperative hypothyroidism     left lobectomy, Dr Wesley Boyce Prediabetes     Prolonged emergence from general anesthesia     fights upon emergence; works in [email or web address], woke feeling he was at work and being threatened.     Simple goiter     Solitary thyroid nodule     Thyroid disease      Past Surgical History:   Procedure Laterality Date    CHOLECYSTECTOMY, LAPAROSCOPIC      HERNIA REPAIR      umbilical    INGUINAL HERNIA REPAIR Left     age 10    KNEE ARTHROSCOPY Right     x2    KNEE CARTILAGE SURGERY Left 5/12/2016    ARTHROSCOPY PARTIAL MEDIAL MENISCECTOMY KNEE performed by Roddy Louis DO at [facility]      wisdom teeth removed    THYROIDECTOMY, PARTIAL      Left thyroidectomy, Dr. Wesley Sosa, 2013    UPPER GASTROINTESTINAL ENDOSCOPY      VENTRAL HERNIA REPAIR       Family History   Problem Relation Age of Onset    Other Mother         hypothyroidism    Heart Disease Mother     Diabetes Mother     Heart Attack Father     Breast Cancer Maternal Aunt     Cancer Paternal Aunt      Social History     Tobacco Use    Smoking status: Never Smoker    Smokeless tobacco: Former User     Types: Chew   Substance Use Topics    Alcohol use: No     Current Outpatient Medications   Medication Sig Dispense Refill    amoxicillin-clavulanate (AUGMENTIN) 875-125 MG per tablet Take 1 tablet by mouth 2 times daily for 10 days 20 tablet 0    predniSONE (DELTASONE) 20 MG tablet Take 1 tablet by mouth 2 times daily for 5 days 10 tablet 0    metoclopramide (REGLAN) 10 MG tablet Take 1 tablet by mouth 3 times daily as needed (nausea) 30 tablet 0    fluticasone (FLONASE) 50 MCG/ACT nasal spray 2 sprays by Each Nostril route daily 16 g 0    hydroCHLOROthiazide (HYDRODIURIL) 25 MG tablet TAKE 1 TABLET BY MOUTH EVERY MORNING 30 tablet 1    albuterol sulfate HFA (VENTOLIN HFA) 108 (90 Base) MCG/ACT inhaler Inhale 2 puffs into the lungs 4 times daily as needed for Wheezing 18 g 5    glyBURIDE (DIABETA) 5 MG tablet TAKE 1 TABLET BY MOUTH DAILY (WITH BREAKFAST) 30 tablet 3    clotrimazole-betamethasone (LOTRISONE) 1-0.05 % cream Apply topically 2 times daily. 45 g 0    levothyroxine (SYNTHROID) 100 MCG tablet Take 1 tablet by mouth Daily 30 tablet 5    ondansetron (ZOFRAN) 4 MG tablet Take 1 tablet by mouth 3 times daily as needed for Nausea or Vomiting 30 tablet 0    losartan (COZAAR) 25 MG tablet Take 1 tablet by mouth daily 30 tablet 5     No current facility-administered medications for this visit. No Known Allergies    Review of Systems   Constitutional: Negative for appetite change, chills, diaphoresis, fatigue and fever. HENT: Positive for congestion. Negative for ear pain, hearing loss, postnasal drip, sinus pressure, sore throat and trouble swallowing. Eyes: Negative for pain, discharge and visual disturbance. Respiratory: Negative for cough, chest tightness, shortness of breath and wheezing. Cardiovascular: Negative for chest pain and palpitations. Gastrointestinal: Positive for nausea. Negative for abdominal pain, constipation, diarrhea and vomiting. Genitourinary: Negative for difficulty urinating, dysuria, flank pain, frequency, hematuria and urgency. Musculoskeletal: Negative for arthralgias, back pain, joint swelling and neck pain. Skin: Negative for color change and rash. Neurological: Positive for dizziness. Negative for syncope, weakness, light-headedness and headaches. Hematological: Negative for adenopathy. Does not bruise/bleed easily. Psychiatric/Behavioral: Negative for behavioral problems, confusion and dysphoric mood. The patient is not nervous/anxious. OBJECTIVE:  /70   Pulse 84   Temp 97.6 °F (36.4 °C) (Temporal)   Resp 18   Ht 6' 4\" (1.93 m)   Wt 288 lb 9.6 oz (130.9 kg)   SpO2 98%   BMI 35.13 kg/m²    Physical Exam  Vitals reviewed. Constitutional:       General: He is not in acute distress. Appearance: He is well-developed. He is not ill-appearing or toxic-appearing. HENT:      Head: Normocephalic and atraumatic. Left Ear: Tympanic membrane is erythematous. Eyes:      General:         Right eye: No discharge. Left eye: No discharge. Extraocular Movements: Extraocular movements intact. Conjunctiva/sclera: Conjunctivae normal.      Pupils: Pupils are equal, round, and reactive to light. Neck:      Thyroid: No thyromegaly. Trachea: No tracheal deviation. Cardiovascular:      Rate and Rhythm: Normal rate and regular rhythm. Heart sounds: No murmur heard. Pulmonary:      Effort: Pulmonary effort is normal. No respiratory distress. Breath sounds: Normal breath sounds. No wheezing or rales. Genitourinary:     Penis: No tenderness. Musculoskeletal:         General: No deformity. Normal range of motion. Cervical back: Normal range of motion and neck supple. Lymphadenopathy:      Cervical: No cervical adenopathy. Skin:     General: Skin is warm and dry. Findings: No erythema or rash. Neurological:      General: No focal deficit present. Mental Status: He is alert and oriented to person, place, and time. Mental status is at baseline. Psychiatric:         Mood and Affect: Mood normal.         Behavior: Behavior normal.         Thought Content: Thought content normal.         Judgment: Judgment normal.         Lab Results   Component Value Date    LABA1C 9.8 05/11/2022     No results found for: EAG    ASSESSMENT/PLAN:  1. Type 2 diabetes mellitus without complication, without long-term current use of insulin (HCC)  Uncontrolled. Need to see if he ever took metformin- will need to adjust his med regimen greatly today and make diet changes and follow closely  - POCT glycosylated hemoglobin (Hb A1C)    2. Non-recurrent acute suppurative otitis media of left ear without spontaneous rupture of tympanic membrane  - amoxicillin-clavulanate (AUGMENTIN) 875-125 MG per tablet; Take 1 tablet by mouth 2 times daily for 10 days  Dispense: 20 tablet; Refill: 0  - predniSONE (DELTASONE) 20 MG tablet; Take 1 tablet by mouth 2 times daily for 5 days  Dispense: 10 tablet; Refill: 0  - fluticasone (FLONASE) 50 MCG/ACT nasal spray; 2 sprays by Each Nostril route daily  Dispense: 16 g; Refill: 0    3. Nausea  - metoclopramide (REGLAN) 10 MG tablet; Take 1 tablet by mouth 3 times daily as needed (nausea)  Dispense: 30 tablet; Refill: 0    4. Dizziness          Return if symptoms worsen or fail to improve.

## 2022-05-13 ASSESSMENT — ENCOUNTER SYMPTOMS
NAUSEA: 1
SINUS PRESSURE: 0
CONSTIPATION: 0
BACK PAIN: 0
DIARRHEA: 0
EYE DISCHARGE: 0
EYE PAIN: 0
ABDOMINAL PAIN: 0
COLOR CHANGE: 0
WHEEZING: 0
VOMITING: 0
SHORTNESS OF BREATH: 0
COUGH: 0
CHEST TIGHTNESS: 0
TROUBLE SWALLOWING: 0
SORE THROAT: 0

## 2022-05-18 ENCOUNTER — OFFICE VISIT (OUTPATIENT)
Dept: ENT CLINIC | Age: 52
End: 2022-05-18
Payer: COMMERCIAL

## 2022-05-18 VITALS
HEART RATE: 81 BPM | BODY MASS INDEX: 34.61 KG/M2 | OXYGEN SATURATION: 97 % | HEIGHT: 76 IN | WEIGHT: 284.2 LBS | DIASTOLIC BLOOD PRESSURE: 72 MMHG | TEMPERATURE: 97.5 F | SYSTOLIC BLOOD PRESSURE: 128 MMHG

## 2022-05-18 DIAGNOSIS — H81.13 BENIGN PAROXYSMAL POSITIONAL VERTIGO DUE TO BILATERAL VESTIBULAR DISORDER: Primary | ICD-10-CM

## 2022-05-18 PROCEDURE — 99214 OFFICE O/P EST MOD 30 MIN: CPT | Performed by: PHYSICIAN ASSISTANT

## 2022-05-18 RX ORDER — MECLIZINE HYDROCHLORIDE 25 MG/1
25 TABLET ORAL 3 TIMES DAILY PRN
Qty: 30 TABLET | Refills: 2 | Status: SHIPPED | OUTPATIENT
Start: 2022-05-18 | End: 2022-05-28

## 2022-05-18 RX ORDER — PREDNISONE 10 MG/1
TABLET ORAL
Qty: 64 TABLET | Refills: 0 | Status: SHIPPED | OUTPATIENT
Start: 2022-05-18 | End: 2022-07-12 | Stop reason: ALTCHOICE

## 2022-05-18 RX ORDER — ONDANSETRON 4 MG/1
TABLET, ORALLY DISINTEGRATING ORAL
COMMUNITY
Start: 2022-04-12

## 2022-05-18 ASSESSMENT — ENCOUNTER SYMPTOMS
SORE THROAT: 0
EYE PAIN: 0
PHOTOPHOBIA: 0
SINUS PRESSURE: 0
TROUBLE SWALLOWING: 0
FACIAL SWELLING: 0
VOICE CHANGE: 0
SINUS PAIN: 0
RHINORRHEA: 0

## 2022-05-18 NOTE — PROGRESS NOTES
[facility]/ENT  Mr. Kendall Salas is a pleasant 54-year-old  male that [facility] problems with [facility]igo. Patient reports that the onset began about a month ago and has been persisting more frequent. He reports that when he bends over or turns his head, he has an acute flareup of vertigo with nausea/vomiting. He has required 1 visit to the ER due to severity of the nausea/vomiting. Overall he denies a history of vertigo and denies a family history of Ménière's disease. He also denies any trauma to the head region. Allergies: Patient has no known allergies. Current Outpatient Medications   Medication Sig Dispense Refill    ondansetron (ZOFRAN-ODT) 4 MG disintegrating tablet DISSOLVE 1 TABLET UNDER THE TONGUE EVERY 4 HOURS AS NEEDED FOR NAUSEA OR VOMITING      predniSONE (DELTASONE) 10 MG tablet Take 3 tabs po bid x 6 days, then decrease to 2 tabs po bid x 4 days, then decrease 1 tab po q day for 4 days 64 tablet 0    meclizine (ANTIVERT) 25 MG tablet Take 1 tablet by mouth 3 times daily as needed for Dizziness 30 tablet 2    amoxicillin-clavulanate (AUGMENTIN) 875-125 MG per tablet Take 1 tablet by mouth 2 times daily for 10 days 20 tablet 0    metoclopramide (REGLAN) 10 MG tablet Take 1 tablet by mouth 3 times daily as needed (nausea) 30 tablet 0    fluticasone (FLONASE) 50 MCG/ACT nasal spray 2 sprays by Each Nostril route daily 16 g 0    hydroCHLOROthiazide (HYDRODIURIL) 25 MG tablet TAKE 1 TABLET BY MOUTH EVERY MORNING 30 tablet 1    albuterol sulfate HFA (VENTOLIN HFA) 108 (90 Base) MCG/ACT inhaler Inhale 2 puffs into the lungs 4 times daily as needed for Wheezing 18 g 5    glyBURIDE (DIABETA) 5 MG tablet TAKE 1 TABLET BY MOUTH DAILY (WITH BREAKFAST) 30 tablet 3    clotrimazole-betamethasone (LOTRISONE) 1-0.05 % cream Apply topically 2 times daily.  45 g 0    levothyroxine (SYNTHROID) 100 MCG tablet Take 1 tablet by mouth Daily 30 tablet 5    ondansetron (ZOFRAN) 4 MG tablet Take 1 tablet by mouth 3 times daily as needed for Nausea or Vomiting 30 tablet 0    losartan (COZAAR) 25 MG tablet Take 1 tablet by mouth daily 30 tablet 5     No current facility-administered medications for this visit. Past Surgical History:   Procedure Laterality Date    CHOLECYSTECTOMY, LAPAROSCOPIC      HERNIA REPAIR      umbilical    INGUINAL HERNIA REPAIR Left     age 9   [facility] KNEE ARTHROSCOPY Right     x2    KNEE CARTILAGE SURGERY Left 5/12/2016    ARTHROSCOPY PARTIAL MEDIAL MENISCECTOMY KNEE performed by Genevieve Smallwood DO at [address] [place] [address]      wisdom teeth removed    THYROIDECTOMY, PARTIAL      Left thyroidectomy, Dr. Rohan Mendes, 2013    UPPER GASTROINTESTINAL ENDOSCOPY      [facility]         Past Medical History:   Diagnosis Date    Acid reflux     Adenomatous goiter     2013 left thyroidectomy  Dr Rohan Mendes   [facility] Arthralgia of multiple sites     Arthritis     osteo    Chronic back pain     Effusion of patella     Gastric ulcer     Knee pain     Medial meniscus tear     Mixed hyperlipidemia     Obesity     Pain of right hand     Postoperative hypothyroidism     left lobectomy, Dr Rohan Mendes    [facility] Prediabetes     Prolonged emergence from general anesthesia     fights upon emergence; works in [employer], woke feeling he was at work and being threatened.     Simple goiter     Solitary thyroid nodule     Thyroid disease        Family History   Problem Relation Age of Onset    Other Mother         hypothyroidism    Heart Disease Mother     Diabetes Mother     Heart Attack Father     Breast Cancer Maternal Aunt     Cancer Paternal Aunt        Social History     Tobacco Use    Smoking status: Never Smoker    Smokeless tobacco: Former User     Types: Chew   Substance Use Topics    Alcohol use: No           REVIEW OF SYSTEMS:  all other systems reviewed and are negative  Review of Systems   Constitutional: Negative for chills and fever.   HENT: Negative for congestion, dental problem, ear discharge, ear pain, facial swelling, hearing loss, postnasal drip, rhinorrhea, sinus pressure, sinus pain, sore throat, tinnitus, trouble swallowing and voice change. Eyes: Negative for photophobia, pain and visual disturbance. Neurological: Negative for dizziness, seizures, syncope and headaches. Comments:     PHYSICAL EXAM:    /72   Pulse 81   Temp 97.5 °F (36.4 °C)   Ht 6' 4\" (1.93 m)   Wt 284 lb 3.2 oz (128.9 kg)   SpO2 97%   BMI 34.59 kg/m²   Body mass index is 34.59 kg/m². General Appearance: well developed  and well nourished  Head/ Face: normocephalic and atraumatic  Vocal Quality: good/ normal  Ears: Right Ear: External: external ears normal Otoscopy Ear Canal: canal clear Otoscopy TM: TM's normal and TM's mobile Left Ear: External: external ears normal Otoscopy Ear Canal: canal clear Otoscopy TM: TM's normal and TM's mobile  Hearing: grossly intact  Nose: nares normal and septum midline  Neck: supple and adenopathy none palpable  Thyroid: normal    Assessment & Plan:    Problem List Items Addressed This Visit     Benign paroxysmal positional vertigo due to bilateral vestibular disorder - Primary     Vertigo likely secondary to BPPV based on patient history  Plan: Due to the patient having such an acute episode gives suspicion for vestibulitis, I will place him on prednisone and refer him to physical therapy for vestibular rehab. Patient is to follow-up as needed.          Relevant Orders    External Referral To Physical Therapy          Orders Placed This Encounter   Procedures    External Referral To Physical Therapy     Referral Priority:   Routine     Referral Type:   Eval and Treat     Referral Reason:   Specialty Services Required     Requested Specialty:   Physical Therapist     Number of Visits Requested:   1       Orders Placed This Encounter   Medications    predniSONE (DELTASONE) 10 MG tablet     Sig: Take 3 tabs po bid x 6 days, then decrease to 2 tabs po bid x 4 days, then decrease 1 tab po q day for 4 days     Dispense:  64 tablet     Refill:  0    meclizine (ANTIVERT) 25 MG tablet     Sig: Take 1 tablet by mouth 3 times daily as needed for Dizziness     Dispense:  30 tablet     Refill:  2       Electronically signed by Meryl Scott PA-C on 5/18/22 at 5:47 PM CDT          Please note that this chart was generated using dragon dictation software. Although every effort was made to ensure the accuracy of this automated transcription, some errors in transcription may have occurred.

## 2022-05-18 NOTE — ASSESSMENT & PLAN NOTE
Vertigo likely secondary to BPPV based on patient history  Plan: Due to the patient having such an acute episode gives suspicion for vestibulitis, I will place him on prednisone and refer him to physical therapy for vestibular rehab. Patient is to follow-up as needed.

## 2022-06-07 ENCOUNTER — TELEPHONE (OUTPATIENT)
Dept: FAMILY MEDICINE CLINIC | Age: 52
End: 2022-06-07

## 2022-06-07 NOTE — LETTER
[facility]  [address] [address]  Phone: [phone]  Fax: [phone]    DENIS Llanos        June 7, 2022     Patient: Familia Cleveland. YOB: 1970   Date of Visit: 6/7/2022       To Whom it May Concern:    Liberty Montejo is a patient of mine and reports that he is free of positional dizziness. Based on this, he is released to return to full duty at this time. If symptoms present again, this may be reconsidered. If you have any questions or concerns, please don't hesitate to call.     Sincerely,         DENIS Llanos

## 2022-06-07 NOTE — TELEPHONE ENCOUNTER
If patient has no existing dizziness and has been free of positional dizziness for atleast one week we can send note stating he is released for full duty.

## 2022-06-07 NOTE — TELEPHONE ENCOUNTER
Patient called stating he is needing a letter for work stating he is okay to drive again. He states he saw ENT and they have helped with the issue he was having. Please advise if approved.

## 2022-06-28 DIAGNOSIS — E03.9 ACQUIRED HYPOTHYROIDISM: ICD-10-CM

## 2022-06-28 RX ORDER — LEVOTHYROXINE SODIUM 0.1 MG/1
100 TABLET ORAL DAILY
Qty: 30 TABLET | Refills: 5 | Status: SHIPPED | OUTPATIENT
Start: 2022-06-28

## 2022-07-12 ENCOUNTER — LAB (OUTPATIENT)
Dept: LAB | Facility: HOSPITAL | Age: 52
End: 2022-07-12

## 2022-07-12 ENCOUNTER — TELEMEDICINE (OUTPATIENT)
Dept: FAMILY MEDICINE CLINIC | Age: 52
End: 2022-07-12
Payer: COMMERCIAL

## 2022-07-12 ENCOUNTER — TELEPHONE (OUTPATIENT)
Dept: FAMILY MEDICINE CLINIC | Facility: CLINIC | Age: 52
End: 2022-07-12

## 2022-07-12 ENCOUNTER — OFFICE VISIT (OUTPATIENT)
Dept: FAMILY MEDICINE CLINIC | Facility: CLINIC | Age: 52
End: 2022-07-12

## 2022-07-12 VITALS
HEIGHT: 76 IN | OXYGEN SATURATION: 97 % | HEART RATE: 80 BPM | WEIGHT: 289 LBS | TEMPERATURE: 98 F | DIASTOLIC BLOOD PRESSURE: 93 MMHG | SYSTOLIC BLOOD PRESSURE: 137 MMHG | BODY MASS INDEX: 35.19 KG/M2

## 2022-07-12 DIAGNOSIS — J06.9 UPPER RESPIRATORY TRACT INFECTION DUE TO COVID-19 VIRUS: Primary | ICD-10-CM

## 2022-07-12 DIAGNOSIS — R05.9 COUGH: ICD-10-CM

## 2022-07-12 DIAGNOSIS — U07.1 UPPER RESPIRATORY TRACT INFECTION DUE TO COVID-19 VIRUS: Primary | ICD-10-CM

## 2022-07-12 DIAGNOSIS — U07.1 COVID-19: ICD-10-CM

## 2022-07-12 DIAGNOSIS — Z20.822 EXPOSURE TO COVID-19 VIRUS: ICD-10-CM

## 2022-07-12 DIAGNOSIS — Z20.822 EXPOSURE TO COVID-19 VIRUS: Primary | ICD-10-CM

## 2022-07-12 DIAGNOSIS — R50.9 FEVER, UNSPECIFIED FEVER CAUSE: ICD-10-CM

## 2022-07-12 DIAGNOSIS — E11.9 TYPE 2 DIABETES MELLITUS WITHOUT COMPLICATION, WITHOUT LONG-TERM CURRENT USE OF INSULIN (HCC): ICD-10-CM

## 2022-07-12 LAB — SARS-COV-2 RNA PNL SPEC NAA+PROBE: DETECTED

## 2022-07-12 PROCEDURE — 87635 SARS-COV-2 COVID-19 AMP PRB: CPT

## 2022-07-12 PROCEDURE — C9803 HOPD COVID-19 SPEC COLLECT: HCPCS

## 2022-07-12 PROCEDURE — 99213 OFFICE O/P EST LOW 20 MIN: CPT | Performed by: NURSE PRACTITIONER

## 2022-07-12 PROCEDURE — 3046F HEMOGLOBIN A1C LEVEL >9.0%: CPT | Performed by: CLINICAL NURSE SPECIALIST

## 2022-07-12 PROCEDURE — 99214 OFFICE O/P EST MOD 30 MIN: CPT | Performed by: CLINICAL NURSE SPECIALIST

## 2022-07-12 RX ORDER — LEVOTHYROXINE SODIUM 0.1 MG/1
1 TABLET ORAL DAILY
COMMUNITY
Start: 2022-06-28

## 2022-07-12 ASSESSMENT — ENCOUNTER SYMPTOMS
SINUS PRESSURE: 1
WHEEZING: 0
SORE THROAT: 0
EYE DISCHARGE: 0
FACIAL SWELLING: 0
SHORTNESS OF BREATH: 0
CHEST TIGHTNESS: 1
RHINORRHEA: 0
COUGH: 0
EYE PAIN: 0
NAUSEA: 0
VOMITING: 0
SINUS PAIN: 1

## 2022-07-12 NOTE — PROGRESS NOTES
SUBJECTIVE:  Jolene Daniels. is a 46 y.o. who presents today for Positive For Covid-19      HPI    Mr Bhavani Akins was evaluated today via doxy. me for an acute visit. Last night he developed acute fatigue, sinus pressure and congestion. Today with ongoing symptoms, feeling feverish and abdominal cramping. No nausea or diarrhea. Some chest tightness. No cough or shortness of breath. Had [address] for months following his initial COVID illness in 2020. No CKD. CV + today at [facility]. Noted results on Care Everywhere. Type 2 DM without complication, uncontrolled. Last visit a1c 9.8 but had difficulty reaching him with labs. He is compliant with glyburide. States he has never been on metformin. Is not monitoring blood sugar at home. Could do better on diet. Past Medical History:   Diagnosis Date    Acid reflux     Adenomatous goiter     2013 left thyroidectomy  Dr Dinora Garrison Arthralgia of multiple sites     Arthritis     osteo    Chronic back pain     Effusion of patella     Gastric ulcer     Knee pain     Medial meniscus tear     Mixed hyperlipidemia     Obesity     Pain of right hand     Postoperative hypothyroidism     left lobectomy, Dr Dinora Garrison Prediabetes     Prolonged emergence from general anesthesia     fights upon emergence; works in [employer], woke feeling he was at work and being threatened.     Simple goiter     Solitary thyroid nodule     Thyroid disease      Past Surgical History:   Procedure Laterality Date    CHOLECYSTECTOMY, LAPAROSCOPIC      HERNIA REPAIR      umbilical    INGUINAL HERNIA REPAIR Left     age 10    KNEE ARTHROSCOPY Right     x2    KNEE CARTILAGE SURGERY Left 5/12/2016    ARTHROSCOPY PARTIAL MEDIAL MENISCECTOMY KNEE performed by Irlanda Heaton DO at [address] [place] [address]      [facility]    THYROIDECTOMY, PARTIAL      Left thyroidectomy, Dr. Dinora Chi, 2013    [facility] Family History   Problem Relation Age of Onset    Other Mother         hypothyroidism    Heart Disease Mother     Diabetes Mother     Heart Attack Father     Breast Cancer Maternal Aunt     Cancer Paternal Aunt      Social History     Tobacco Use    Smoking status: Never Smoker    Smokeless tobacco: Former User     Types: Chew   Substance Use Topics    Alcohol use: No     Current Outpatient Medications   Medication Sig Dispense Refill    nirmatrelvir/ritonavir (PAXLOVID) 20 x 150 MG & 10 x 100MG Take 3 tablets (two 150 mg nirmatrelvir and one 100 mg ritonavir tablets) by mouth every 12 hours for 5 days. 30 tablet 0    metFORMIN (GLUCOPHAGE) 500 MG tablet Take 1 tablet by mouth 2 times daily (with meals) 60 tablet 5    levothyroxine (SYNTHROID) 100 MCG tablet TAKE 1 TABLET BY MOUTH DAILY 30 tablet 5    ondansetron (ZOFRAN-ODT) 4 MG disintegrating tablet DISSOLVE 1 TABLET UNDER THE TONGUE EVERY 4 HOURS AS NEEDED FOR NAUSEA OR VOMITING      metoclopramide (REGLAN) 10 MG tablet Take 1 tablet by mouth 3 times daily as needed (nausea) 30 tablet 0    fluticasone (FLONASE) 50 MCG/ACT nasal spray 2 sprays by Each Nostril route daily 16 g 0    hydroCHLOROthiazide (HYDRODIURIL) 25 MG tablet TAKE 1 TABLET BY MOUTH EVERY MORNING 30 tablet 1    albuterol sulfate HFA (VENTOLIN HFA) 108 (90 Base) MCG/ACT inhaler Inhale 2 puffs into the lungs 4 times daily as needed for Wheezing 18 g 5    glyBURIDE (DIABETA) 5 MG tablet TAKE 1 TABLET BY MOUTH DAILY (WITH BREAKFAST) 30 tablet 3    clotrimazole-betamethasone (LOTRISONE) 1-0.05 % cream Apply topically 2 times daily. 45 g 0    ondansetron (ZOFRAN) 4 MG tablet Take 1 tablet by mouth 3 times daily as needed for Nausea or Vomiting 30 tablet 0    losartan (COZAAR) 25 MG tablet Take 1 tablet by mouth daily 30 tablet 5     No current facility-administered medications for this visit.      No Known Allergies    Review of Systems   Constitutional: Positive for fatigue and fever. Negative for appetite change and chills. HENT: Positive for congestion, sinus pressure and sinus pain. Negative for ear discharge, ear pain, facial swelling, hearing loss, postnasal drip, rhinorrhea and sore throat. Eyes: Negative for pain, discharge and visual disturbance. Respiratory: Positive for chest tightness. Negative for cough, shortness of breath and wheezing. Cardiovascular: Negative for chest pain. Gastrointestinal: Negative for nausea and vomiting. Genitourinary: Negative for dysuria, frequency and urgency. Musculoskeletal: Positive for arthralgias and myalgias. Neurological: Negative for weakness, light-headedness and headaches. OBJECTIVE:  There were no vitals taken for this visit. Physical Exam  Constitutional:       General: He is not in acute distress. Appearance: He is ill-appearing. He is not toxic-appearing. HENT:      Head: Normocephalic. Pulmonary:      Effort: No respiratory distress. Neurological:      Mental Status: He is alert and oriented to person, place, and time. Psychiatric:         Mood and Affect: Mood normal.         Behavior: Behavior normal.         Thought Content: Thought content normal.         Judgment: Judgment normal.         ASSESSMENT/PLAN:  1. Upper respiratory tract infection due to COVID-19 virus  New. Albuterol inh/neb every 4-6 hours prn wheezing, shortness of breath  Rest, push fluids. - nirmatrelvir/ritonavir (PAXLOVID) 20 x 150 MG & 10 x 100MG; Take 3 tablets (two 150 mg nirmatrelvir and one 100 mg ritonavir tablets) by mouth every 12 hours for 5 days. Dispense: 30 tablet; Refill: 0    2. Type 2 diabetes mellitus without complication, without long-term current use of insulin (HCC)  Uncontrolled. Add metformin. Start once daily, titrate up to BID after one week  Discussed side effects  Plan a1c at appt next month  - metFORMIN (GLUCOPHAGE) 500 MG tablet;  Take 1 tablet by mouth 2 times daily (with meals)  Dispense: 60 tablet; Refill: 5      Arden Duran., was evaluated through a synchronous (real-time) audio-video encounter. The patient (or guardian if applicable) is aware that this is a billable service, which includes applicable co-pays. This Virtual Visit was conducted with patient's (and/or legal guardian's) consent. The visit was conducted pursuant to the emergency declaration under the [address] [place] [address] and the [employer] and [employer] General Act. Patient identification was verified, and a caregiver was present when appropriate. The patient was located at Home: [facility] [id number]. Provider was located at [facility] (Appt Dept): [address] [facility],  [address] Return for already scheduled.

## 2022-07-12 NOTE — PROGRESS NOTES
"Chief Complaint  Fever, Fatigue, and URI (Patient is here today for sinus issues including fever, fatigue and chest congestion. Patient states son tested positive yesterday.)    Subjective        Rolando Andrade Jr. presents to [facility]  Patient is here today for sinus issues including fever, fatigue and chest congestion. Patient states son tested positive yesterday.      Objective   Vital Signs:  /93   Pulse 80   Temp 98 °F (36.7 °C)   Ht 193 cm (76\")   Wt 131 kg (289 lb)   SpO2 97%   BMI 35.18 kg/m²   Estimated body mass index is 35.18 kg/m² as calculated from the following:    Height as of this encounter: 193 cm (76\").    Weight as of this encounter: 131 kg (289 lb).        Physical Exam  Constitutional:       Appearance: Normal appearance. He is well-developed.   HENT:      Head: Normocephalic and atraumatic.      Right Ear: External ear normal.      Left Ear: External ear normal.      Nose: Congestion present. No nasal tenderness.      Mouth/Throat:      Lips: Pink. No lesions.      Mouth: Mucous membranes are moist. No oral lesions.      Dentition: Normal dentition.      Pharynx: Oropharynx is clear. Posterior oropharyngeal erythema present. No pharyngeal swelling or oropharyngeal exudate.   Eyes:      General: Lids are normal. Vision grossly intact. No scleral icterus.        Right eye: No discharge.         Left eye: No discharge.      Extraocular Movements: Extraocular movements intact.      Conjunctiva/sclera: Conjunctivae normal.      Right eye: Right conjunctiva is not injected.      Left eye: Left conjunctiva is not injected.      Pupils: Pupils are equal, round, and reactive to light.   Cardiovascular:      Rate and Rhythm: Normal rate and regular rhythm.      Heart sounds: Normal heart sounds. No murmur heard.    No gallop.   Pulmonary:      Effort: Pulmonary effort is normal.      Breath sounds: Normal breath sounds and air entry. No wheezing, rhonchi " or rales.   Musculoskeletal:         General: No tenderness or deformity. Normal range of motion.      Cervical back: Full passive range of motion without pain, normal range of motion and neck supple.      Right lower leg: No edema.      Left lower leg: No edema.   Skin:     General: Skin is warm and dry.      Coloration: Skin is not jaundiced.      Findings: No rash.   Neurological:      Mental Status: He is alert and oriented to person, place, and time.      Cranial Nerves: Cranial nerves are intact.      Sensory: Sensation is intact.      Motor: Motor function is intact.      Coordination: Coordination is intact.      Gait: Gait is intact.   Psychiatric:         Attention and Perception: Attention normal.         Mood and Affect: Mood and affect normal.         Behavior: Behavior is not hyperactive. Behavior is cooperative.         Thought Content: Thought content normal.         Judgment: Judgment normal.        Result Review :                Assessment and Plan   Diagnoses and all orders for this visit:    1. Exposure to COVID-19 virus (Primary)  -     COVID-19,[employer] IN-HOUSE,Nasal Swab No Transport Media 3-4 HR TAT - Swab, Nasal Cavity; Future    2. Cough    3. Fever, unspecified fever cause    4. COVID-19    Patient comes in today complaining of a fever and cough that started last night.  His son was diagnosed with COVID yesterday.  I did discuss the likelihood of this being COVID even if he has a negative test today with how soon we are doing the testing.  I did recommend with a negative test retesting in a few days.  Also recommended with a positive test he discussing it further with his primary care doctor on whether or not that they want him to proceed with any type of treatment.  He voiced understanding.         Follow Up   Return if symptoms worsen or fail to improve.  Patient was given instructions and counseling regarding his condition or for health maintenance advice. Please see specific  information pulled into the AVS if appropriate.

## 2022-08-12 RX ORDER — GLYBURIDE 5 MG/1
5 TABLET ORAL
Qty: 30 TABLET | Refills: 0 | Status: SHIPPED | OUTPATIENT
Start: 2022-08-12 | End: 2022-09-15 | Stop reason: SDUPTHER

## 2022-08-12 NOTE — TELEPHONE ENCOUNTER
Tray Riley. called to request a refill on his medication.       Last office visit : 5/11/2022   Next office visit : Visit date not found     Requested Prescriptions     Pending Prescriptions Disp Refills    glyBURIDE (DIABETA) 5 MG tablet [Pharmacy Med Name: GLYBURIDE 5 MG TABLET 5 Tablet] 30 tablet 3     Sig: TAKE 1 TABLET BY MOUTH DAILY (WITH BREAKFAST)            Kranthi Estrada, DEBRAN

## 2022-09-05 ENCOUNTER — HOSPITAL ENCOUNTER (INPATIENT)
Age: 52
LOS: 3 days | Discharge: HOME | DRG: 392 | End: 2022-09-08
Attending: PEDIATRICS | Admitting: HOSPITALIST
Payer: COMMERCIAL

## 2022-09-05 ENCOUNTER — APPOINTMENT (OUTPATIENT)
Dept: CT IMAGING | Age: 52
DRG: 392 | End: 2022-09-05
Payer: COMMERCIAL

## 2022-09-05 DIAGNOSIS — A09: ICD-10-CM

## 2022-09-05 DIAGNOSIS — R10.84 GENERALIZED ABDOMINAL PAIN: Primary | ICD-10-CM

## 2022-09-05 DIAGNOSIS — K52.9 ENTERITIS: ICD-10-CM

## 2022-09-05 PROBLEM — D72.9 NEUTROPHILIA: Status: ACTIVE | Noted: 2022-09-05

## 2022-09-05 PROBLEM — D72.829 LEUKOCYTOSIS: Status: ACTIVE | Noted: 2022-09-05

## 2022-09-05 PROBLEM — D72.828 NEUTROPHILIA: Status: ACTIVE | Noted: 2022-09-05

## 2022-09-05 PROBLEM — E87.20 LACTIC ACIDOSIS: Status: ACTIVE | Noted: 2022-09-05

## 2022-09-05 LAB
ALBUMIN SERPL-MCNC: 4.4 G/DL (ref 3.5–5.2)
ALP BLD-CCNC: 92 U/L (ref 40–130)
ALT SERPL-CCNC: 24 U/L (ref 5–41)
ANION GAP SERPL CALCULATED.3IONS-SCNC: 11 MMOL/L (ref 7–19)
AST SERPL-CCNC: 16 U/L (ref 5–40)
BASOPHILS ABSOLUTE: 0.1 K/UL (ref 0–0.2)
BASOPHILS RELATIVE PERCENT: 0.5 % (ref 0–1)
BILIRUB SERPL-MCNC: <0.2 MG/DL (ref 0.2–1.2)
BILIRUBIN URINE: NEGATIVE
BLOOD, URINE: NEGATIVE
BUN BLDV-MCNC: 18 MG/DL (ref 6–20)
CALCIUM SERPL-MCNC: 9.5 MG/DL (ref 8.6–10)
CHLORIDE BLD-SCNC: 99 MMOL/L (ref 98–111)
CLARITY: CLEAR
CO2: 26 MMOL/L (ref 22–29)
COLOR: YELLOW
CREAT SERPL-MCNC: 0.9 MG/DL (ref 0.5–1.2)
EOSINOPHILS ABSOLUTE: 0.8 K/UL (ref 0–0.6)
EOSINOPHILS RELATIVE PERCENT: 6.4 % (ref 0–5)
GFR AFRICAN AMERICAN: >59
GFR NON-AFRICAN AMERICAN: >60
GLUCOSE BLD-MCNC: 143 MG/DL (ref 70–99)
GLUCOSE BLD-MCNC: 149 MG/DL (ref 70–99)
GLUCOSE BLD-MCNC: 162 MG/DL (ref 70–99)
GLUCOSE BLD-MCNC: 252 MG/DL (ref 70–99)
GLUCOSE BLD-MCNC: 300 MG/DL (ref 74–109)
GLUCOSE URINE: =>1000 MG/DL
HBA1C MFR BLD: 8.7 % (ref 4–6)
HCT VFR BLD CALC: 50 % (ref 42–52)
HEMOGLOBIN: 16 G/DL (ref 14–18)
IMMATURE GRANULOCYTES #: 0.1 K/UL
KETONES, URINE: ABNORMAL MG/DL
LACTIC ACID: 1.6 MMOL/L (ref 0.5–1.9)
LACTIC ACID: 2.3 MMOL/L (ref 0.5–1.9)
LACTIC ACID: 2.5 MMOL/L (ref 0.5–1.9)
LEUKOCYTE ESTERASE, URINE: NEGATIVE
LIPASE: 32 U/L (ref 13–60)
LYMPHOCYTES ABSOLUTE: 2.3 K/UL (ref 1.1–4.5)
LYMPHOCYTES RELATIVE PERCENT: 19.4 % (ref 20–40)
MCH RBC QN AUTO: 27 PG (ref 27–31)
MCHC RBC AUTO-ENTMCNC: 32 G/DL (ref 33–37)
MCV RBC AUTO: 84.3 FL (ref 80–94)
MONOCYTES ABSOLUTE: 0.6 K/UL (ref 0–0.9)
MONOCYTES RELATIVE PERCENT: 5.2 % (ref 0–10)
NEUTROPHILS ABSOLUTE: 7.9 K/UL (ref 1.5–7.5)
NEUTROPHILS RELATIVE PERCENT: 67.4 % (ref 50–65)
NITRITE, URINE: NEGATIVE
PDW BLD-RTO: 13.5 % (ref 11.5–14.5)
PERFORMED ON: ABNORMAL
PH UA: 5.5 (ref 5–8)
PLATELET # BLD: 251 K/UL (ref 130–400)
PMV BLD AUTO: 9 FL (ref 9.4–12.4)
POTASSIUM SERPL-SCNC: 4.5 MMOL/L (ref 3.5–5)
PROTEIN UA: NEGATIVE MG/DL
RBC # BLD: 5.93 M/UL (ref 4.7–6.1)
SARS-COV-2, NAAT: NOT DETECTED
SODIUM BLD-SCNC: 136 MMOL/L (ref 136–145)
SPECIFIC GRAVITY UA: >=1.045 (ref 1–1.03)
TOTAL PROTEIN: 7.5 G/DL (ref 6.6–8.7)
UROBILINOGEN, URINE: 1 E.U./DL
WBC # BLD: 11.7 K/UL (ref 4.8–10.8)

## 2022-09-05 PROCEDURE — 2580000003 HC RX 258: Performed by: HOSPITALIST

## 2022-09-05 PROCEDURE — 82947 ASSAY GLUCOSE BLOOD QUANT: CPT

## 2022-09-05 PROCEDURE — 74177 CT ABD & PELVIS W/CONTRAST: CPT | Performed by: RADIOLOGY

## 2022-09-05 PROCEDURE — 83036 HEMOGLOBIN GLYCOSYLATED A1C: CPT

## 2022-09-05 PROCEDURE — 85025 COMPLETE CBC W/AUTO DIFF WBC: CPT

## 2022-09-05 PROCEDURE — 2580000003 HC RX 258: Performed by: PEDIATRICS

## 2022-09-05 PROCEDURE — 83605 ASSAY OF LACTIC ACID: CPT

## 2022-09-05 PROCEDURE — 81003 URINALYSIS AUTO W/O SCOPE: CPT

## 2022-09-05 PROCEDURE — 6360000002 HC RX W HCPCS: Performed by: PEDIATRICS

## 2022-09-05 PROCEDURE — 74177 CT ABD & PELVIS W/CONTRAST: CPT

## 2022-09-05 PROCEDURE — 80053 COMPREHEN METABOLIC PANEL: CPT

## 2022-09-05 PROCEDURE — 36415 COLL VENOUS BLD VENIPUNCTURE: CPT

## 2022-09-05 PROCEDURE — 96374 THER/PROPH/DIAG INJ IV PUSH: CPT

## 2022-09-05 PROCEDURE — 6360000002 HC RX W HCPCS: Performed by: HOSPITALIST

## 2022-09-05 PROCEDURE — 87040 BLOOD CULTURE FOR BACTERIA: CPT

## 2022-09-05 PROCEDURE — 99285 EMERGENCY DEPT VISIT HI MDM: CPT

## 2022-09-05 PROCEDURE — 83690 ASSAY OF LIPASE: CPT

## 2022-09-05 PROCEDURE — 96375 TX/PRO/DX INJ NEW DRUG ADDON: CPT

## 2022-09-05 PROCEDURE — 87635 SARS-COV-2 COVID-19 AMP PRB: CPT

## 2022-09-05 PROCEDURE — 6370000000 HC RX 637 (ALT 250 FOR IP): Performed by: HOSPITALIST

## 2022-09-05 PROCEDURE — 6360000004 HC RX CONTRAST MEDICATION: Performed by: PEDIATRICS

## 2022-09-05 PROCEDURE — 1210000000 HC MED SURG R&B

## 2022-09-05 RX ORDER — MORPHINE SULFATE 4 MG/ML
4 INJECTION, SOLUTION INTRAMUSCULAR; INTRAVENOUS ONCE
Status: COMPLETED | OUTPATIENT
Start: 2022-09-05 | End: 2022-09-05

## 2022-09-05 RX ORDER — INSULIN GLARGINE 100 [IU]/ML
15 INJECTION, SOLUTION SUBCUTANEOUS NIGHTLY
Status: DISCONTINUED | OUTPATIENT
Start: 2022-09-05 | End: 2022-09-06

## 2022-09-05 RX ORDER — DEXTROSE MONOHYDRATE 100 MG/ML
INJECTION, SOLUTION INTRAVENOUS CONTINUOUS PRN
Status: DISCONTINUED | OUTPATIENT
Start: 2022-09-05 | End: 2022-09-08 | Stop reason: HOSPADM

## 2022-09-05 RX ORDER — SODIUM CHLORIDE 0.9 % (FLUSH) 0.9 %
5-40 SYRINGE (ML) INJECTION EVERY 12 HOURS SCHEDULED
Status: DISCONTINUED | OUTPATIENT
Start: 2022-09-05 | End: 2022-09-08 | Stop reason: HOSPADM

## 2022-09-05 RX ORDER — ONDANSETRON 2 MG/ML
4 INJECTION INTRAMUSCULAR; INTRAVENOUS EVERY 6 HOURS PRN
Status: DISCONTINUED | OUTPATIENT
Start: 2022-09-05 | End: 2022-09-08 | Stop reason: HOSPADM

## 2022-09-05 RX ORDER — POLYETHYLENE GLYCOL 3350 17 G/17G
17 POWDER, FOR SOLUTION ORAL DAILY PRN
Status: DISCONTINUED | OUTPATIENT
Start: 2022-09-05 | End: 2022-09-08 | Stop reason: HOSPADM

## 2022-09-05 RX ORDER — SODIUM CHLORIDE 9 MG/ML
INJECTION, SOLUTION INTRAVENOUS PRN
Status: DISCONTINUED | OUTPATIENT
Start: 2022-09-05 | End: 2022-09-08 | Stop reason: HOSPADM

## 2022-09-05 RX ORDER — POTASSIUM CHLORIDE 20 MEQ/1
40 TABLET, EXTENDED RELEASE ORAL PRN
Status: DISCONTINUED | OUTPATIENT
Start: 2022-09-05 | End: 2022-09-08 | Stop reason: HOSPADM

## 2022-09-05 RX ORDER — ONDANSETRON 2 MG/ML
4 INJECTION INTRAMUSCULAR; INTRAVENOUS ONCE
Status: COMPLETED | OUTPATIENT
Start: 2022-09-05 | End: 2022-09-05

## 2022-09-05 RX ORDER — POTASSIUM CHLORIDE 7.45 MG/ML
10 INJECTION INTRAVENOUS PRN
Status: DISCONTINUED | OUTPATIENT
Start: 2022-09-05 | End: 2022-09-08 | Stop reason: HOSPADM

## 2022-09-05 RX ORDER — GLIPIZIDE 5 MG/1
7.5 TABLET ORAL
Status: DISCONTINUED | OUTPATIENT
Start: 2022-09-05 | End: 2022-09-05

## 2022-09-05 RX ORDER — MECOBALAMIN 5000 MCG
5 TABLET,DISINTEGRATING ORAL NIGHTLY PRN
Status: DISCONTINUED | OUTPATIENT
Start: 2022-09-05 | End: 2022-09-08 | Stop reason: HOSPADM

## 2022-09-05 RX ORDER — ACETAMINOPHEN 325 MG/1
650 TABLET ORAL EVERY 6 HOURS PRN
Status: DISCONTINUED | OUTPATIENT
Start: 2022-09-05 | End: 2022-09-08 | Stop reason: HOSPADM

## 2022-09-05 RX ORDER — MAGNESIUM SULFATE IN WATER 40 MG/ML
2000 INJECTION, SOLUTION INTRAVENOUS PRN
Status: DISCONTINUED | OUTPATIENT
Start: 2022-09-05 | End: 2022-09-08 | Stop reason: HOSPADM

## 2022-09-05 RX ORDER — HYDROMORPHONE HYDROCHLORIDE 1 MG/ML
0.5 INJECTION, SOLUTION INTRAMUSCULAR; INTRAVENOUS; SUBCUTANEOUS
Status: DISCONTINUED | OUTPATIENT
Start: 2022-09-05 | End: 2022-09-08 | Stop reason: HOSPADM

## 2022-09-05 RX ORDER — SODIUM CHLORIDE, SODIUM LACTATE, POTASSIUM CHLORIDE, CALCIUM CHLORIDE 600; 310; 30; 20 MG/100ML; MG/100ML; MG/100ML; MG/100ML
INJECTION, SOLUTION INTRAVENOUS CONTINUOUS
Status: DISCONTINUED | OUTPATIENT
Start: 2022-09-05 | End: 2022-09-08 | Stop reason: HOSPADM

## 2022-09-05 RX ORDER — ACETAMINOPHEN 650 MG/1
650 SUPPOSITORY RECTAL EVERY 6 HOURS PRN
Status: DISCONTINUED | OUTPATIENT
Start: 2022-09-05 | End: 2022-09-08 | Stop reason: HOSPADM

## 2022-09-05 RX ORDER — CALCIUM CARBONATE 200(500)MG
500 TABLET,CHEWABLE ORAL 3 TIMES DAILY PRN
Status: DISCONTINUED | OUTPATIENT
Start: 2022-09-05 | End: 2022-09-08 | Stop reason: HOSPADM

## 2022-09-05 RX ORDER — NALOXONE HYDROCHLORIDE 0.4 MG/ML
0.4 INJECTION, SOLUTION INTRAMUSCULAR; INTRAVENOUS; SUBCUTANEOUS PRN
Status: DISCONTINUED | OUTPATIENT
Start: 2022-09-05 | End: 2022-09-08 | Stop reason: HOSPADM

## 2022-09-05 RX ORDER — INSULIN LISPRO 100 [IU]/ML
0-8 INJECTION, SOLUTION INTRAVENOUS; SUBCUTANEOUS
Status: DISCONTINUED | OUTPATIENT
Start: 2022-09-05 | End: 2022-09-08 | Stop reason: HOSPADM

## 2022-09-05 RX ORDER — HYDROMORPHONE HYDROCHLORIDE 1 MG/ML
1 INJECTION, SOLUTION INTRAMUSCULAR; INTRAVENOUS; SUBCUTANEOUS
Status: DISCONTINUED | OUTPATIENT
Start: 2022-09-05 | End: 2022-09-08 | Stop reason: HOSPADM

## 2022-09-05 RX ORDER — LEVOTHYROXINE SODIUM 0.1 MG/1
100 TABLET ORAL DAILY
Status: DISCONTINUED | OUTPATIENT
Start: 2022-09-05 | End: 2022-09-08 | Stop reason: HOSPADM

## 2022-09-05 RX ORDER — OXYCODONE HYDROCHLORIDE 5 MG/1
10 TABLET ORAL EVERY 4 HOURS PRN
Status: DISCONTINUED | OUTPATIENT
Start: 2022-09-05 | End: 2022-09-08 | Stop reason: HOSPADM

## 2022-09-05 RX ORDER — GLYBURIDE 5 MG/1
5 TABLET ORAL
Status: DISCONTINUED | OUTPATIENT
Start: 2022-09-05 | End: 2022-09-05 | Stop reason: CLARIF

## 2022-09-05 RX ORDER — OXYCODONE HYDROCHLORIDE 5 MG/1
5 TABLET ORAL EVERY 4 HOURS PRN
Status: DISCONTINUED | OUTPATIENT
Start: 2022-09-05 | End: 2022-09-08 | Stop reason: HOSPADM

## 2022-09-05 RX ORDER — ENOXAPARIN SODIUM 100 MG/ML
30 INJECTION SUBCUTANEOUS 2 TIMES DAILY
Status: DISCONTINUED | OUTPATIENT
Start: 2022-09-05 | End: 2022-09-08 | Stop reason: HOSPADM

## 2022-09-05 RX ORDER — INSULIN LISPRO 100 [IU]/ML
0-4 INJECTION, SOLUTION INTRAVENOUS; SUBCUTANEOUS NIGHTLY
Status: DISCONTINUED | OUTPATIENT
Start: 2022-09-05 | End: 2022-09-08 | Stop reason: HOSPADM

## 2022-09-05 RX ORDER — ONDANSETRON 4 MG/1
4 TABLET, ORALLY DISINTEGRATING ORAL EVERY 8 HOURS PRN
Status: DISCONTINUED | OUTPATIENT
Start: 2022-09-05 | End: 2022-09-08 | Stop reason: HOSPADM

## 2022-09-05 RX ORDER — HYDROMORPHONE HYDROCHLORIDE 1 MG/ML
1 INJECTION, SOLUTION INTRAMUSCULAR; INTRAVENOUS; SUBCUTANEOUS ONCE
Status: COMPLETED | OUTPATIENT
Start: 2022-09-05 | End: 2022-09-05

## 2022-09-05 RX ORDER — INSULIN GLARGINE 100 [IU]/ML
10 INJECTION, SOLUTION SUBCUTANEOUS ONCE
Status: COMPLETED | OUTPATIENT
Start: 2022-09-05 | End: 2022-09-05

## 2022-09-05 RX ORDER — 0.9 % SODIUM CHLORIDE 0.9 %
1000 INTRAVENOUS SOLUTION INTRAVENOUS ONCE
Status: COMPLETED | OUTPATIENT
Start: 2022-09-05 | End: 2022-09-05

## 2022-09-05 RX ORDER — HYDROMORPHONE HYDROCHLORIDE 1 MG/ML
0.5 INJECTION, SOLUTION INTRAMUSCULAR; INTRAVENOUS; SUBCUTANEOUS ONCE
Status: DISCONTINUED | OUTPATIENT
Start: 2022-09-05 | End: 2022-09-08 | Stop reason: HOSPADM

## 2022-09-05 RX ORDER — SODIUM CHLORIDE 0.9 % (FLUSH) 0.9 %
5-40 SYRINGE (ML) INJECTION PRN
Status: DISCONTINUED | OUTPATIENT
Start: 2022-09-05 | End: 2022-09-08 | Stop reason: HOSPADM

## 2022-09-05 RX ADMIN — PIPERACILLIN AND TAZOBACTAM 3375 MG: 3; .375 INJECTION, POWDER, LYOPHILIZED, FOR SOLUTION INTRAVENOUS at 13:32

## 2022-09-05 RX ADMIN — PIPERACILLIN SODIUM AND TAZOBACTAM SODIUM 4500 MG: 4; .5 INJECTION, POWDER, LYOPHILIZED, FOR SOLUTION INTRAVENOUS at 07:14

## 2022-09-05 RX ADMIN — PIPERACILLIN AND TAZOBACTAM 3375 MG: 3; .375 INJECTION, POWDER, LYOPHILIZED, FOR SOLUTION INTRAVENOUS at 21:06

## 2022-09-05 RX ADMIN — ACETAMINOPHEN 650 MG: 325 TABLET, FILM COATED ORAL at 17:15

## 2022-09-05 RX ADMIN — SODIUM CHLORIDE, POTASSIUM CHLORIDE, SODIUM LACTATE AND CALCIUM CHLORIDE: 600; 310; 30; 20 INJECTION, SOLUTION INTRAVENOUS at 09:45

## 2022-09-05 RX ADMIN — HYDROMORPHONE HYDROCHLORIDE 0.5 MG: 1 INJECTION, SOLUTION INTRAMUSCULAR; INTRAVENOUS; SUBCUTANEOUS at 07:05

## 2022-09-05 RX ADMIN — ENOXAPARIN SODIUM 30 MG: 100 INJECTION SUBCUTANEOUS at 21:06

## 2022-09-05 RX ADMIN — ONDANSETRON 4 MG: 2 INJECTION INTRAMUSCULAR; INTRAVENOUS at 18:38

## 2022-09-05 RX ADMIN — SODIUM CHLORIDE: 9 INJECTION, SOLUTION INTRAVENOUS at 13:32

## 2022-09-05 RX ADMIN — MORPHINE SULFATE 4 MG: 4 INJECTION, SOLUTION INTRAMUSCULAR; INTRAVENOUS at 04:07

## 2022-09-05 RX ADMIN — ONDANSETRON 4 MG: 2 INJECTION INTRAMUSCULAR; INTRAVENOUS at 13:23

## 2022-09-05 RX ADMIN — SODIUM CHLORIDE 1000 ML: 9 INJECTION, SOLUTION INTRAVENOUS at 07:05

## 2022-09-05 RX ADMIN — OXYCODONE 5 MG: 5 TABLET ORAL at 13:41

## 2022-09-05 RX ADMIN — INSULIN LISPRO 4 UNITS: 100 INJECTION, SOLUTION INTRAVENOUS; SUBCUTANEOUS at 08:40

## 2022-09-05 RX ADMIN — OXYCODONE 10 MG: 5 TABLET ORAL at 08:38

## 2022-09-05 RX ADMIN — IOPAMIDOL 75 ML: 755 INJECTION, SOLUTION INTRAVENOUS at 04:36

## 2022-09-05 RX ADMIN — HYDROMORPHONE HYDROCHLORIDE 0.5 MG: 1 INJECTION, SOLUTION INTRAMUSCULAR; INTRAVENOUS; SUBCUTANEOUS at 11:17

## 2022-09-05 RX ADMIN — SODIUM CHLORIDE 1000 ML: 9 INJECTION, SOLUTION INTRAVENOUS at 04:06

## 2022-09-05 RX ADMIN — INSULIN GLARGINE 10 UNITS: 100 INJECTION, SOLUTION SUBCUTANEOUS at 08:39

## 2022-09-05 RX ADMIN — LEVOTHYROXINE SODIUM 100 MCG: 100 TABLET ORAL at 08:39

## 2022-09-05 RX ADMIN — ONDANSETRON 4 MG: 2 INJECTION INTRAMUSCULAR; INTRAVENOUS at 04:07

## 2022-09-05 RX ADMIN — HYDROMORPHONE HYDROCHLORIDE 1 MG: 1 INJECTION, SOLUTION INTRAMUSCULAR; INTRAVENOUS; SUBCUTANEOUS at 05:48

## 2022-09-05 RX ADMIN — HYDROMORPHONE HYDROCHLORIDE 0.5 MG: 1 INJECTION, SOLUTION INTRAMUSCULAR; INTRAVENOUS; SUBCUTANEOUS at 18:38

## 2022-09-05 RX ADMIN — SODIUM CHLORIDE, POTASSIUM CHLORIDE, SODIUM LACTATE AND CALCIUM CHLORIDE: 600; 310; 30; 20 INJECTION, SOLUTION INTRAVENOUS at 18:54

## 2022-09-05 ASSESSMENT — LIFESTYLE VARIABLES
HOW MANY STANDARD DRINKS CONTAINING ALCOHOL DO YOU HAVE ON A TYPICAL DAY: PATIENT DOES NOT DRINK
HOW OFTEN DO YOU HAVE A DRINK CONTAINING ALCOHOL: NEVER

## 2022-09-05 ASSESSMENT — ENCOUNTER SYMPTOMS
NAUSEA: 0
BACK PAIN: 0
EYE DISCHARGE: 0
COUGH: 0
SHORTNESS OF BREATH: 0
BLOOD IN STOOL: 0
DIARRHEA: 0
VOMITING: 0
ABDOMINAL DISTENTION: 1
CONSTIPATION: 0
ABDOMINAL PAIN: 1
VOMITING: 1
NAUSEA: 1
WHEEZING: 0
COLOR CHANGE: 0
RHINORRHEA: 0

## 2022-09-05 ASSESSMENT — PAIN DESCRIPTION - DESCRIPTORS
DESCRIPTORS: JABBING
DESCRIPTORS: DISCOMFORT;JABBING
DESCRIPTORS: SHARP
DESCRIPTORS: POUNDING
DESCRIPTORS: SHARP;STABBING

## 2022-09-05 ASSESSMENT — PAIN - FUNCTIONAL ASSESSMENT
PAIN_FUNCTIONAL_ASSESSMENT: PREVENTS OR INTERFERES SOME ACTIVE ACTIVITIES AND ADLS
PAIN_FUNCTIONAL_ASSESSMENT: 0-10
PAIN_FUNCTIONAL_ASSESSMENT: PREVENTS OR INTERFERES SOME ACTIVE ACTIVITIES AND ADLS
PAIN_FUNCTIONAL_ASSESSMENT: PREVENTS OR INTERFERES SOME ACTIVE ACTIVITIES AND ADLS

## 2022-09-05 ASSESSMENT — PAIN DESCRIPTION - LOCATION
LOCATION: ABDOMEN

## 2022-09-05 ASSESSMENT — PAIN SCALES - GENERAL
PAINLEVEL_OUTOF10: 10
PAINLEVEL_OUTOF10: 3
PAINLEVEL_OUTOF10: 4
PAINLEVEL_OUTOF10: 6
PAINLEVEL_OUTOF10: 7
PAINLEVEL_OUTOF10: 10
PAINLEVEL_OUTOF10: 0
PAINLEVEL_OUTOF10: 5
PAINLEVEL_OUTOF10: 7
PAINLEVEL_OUTOF10: 3

## 2022-09-05 ASSESSMENT — PAIN DESCRIPTION - ORIENTATION
ORIENTATION: MID

## 2022-09-05 NOTE — ED PROVIDER NOTES
[facility] 3 HAZEL/VAS/MED  eMERGENCY dEPARTMENT eNCOUnter      Pt Name: Rosas Zuniga MRN: [id number]  Birthdate 1970  Date of evaluation: 9/5/2022  Provider: Harvinder Hickman MD    CHIEF COMPLAINT       Chief Complaint   Patient presents with    Abdominal Pain     Abdominal pain x 2 hrs had 2 bms yesterday         HISTORY OF PRESENT ILLNESS   (Location/Symptom, Timing/Onset,Context/Setting, Quality, Duration, Modifying Factors, Severity)  Note limiting factors. Rosas Zuniga is a 46 y.o. male who presents to the emergency department with abdominal pain. Patient states that abdominal pain began 2 hours prior to arrival.  Severity is \"severe\" and patient rates it as a 7 out of 10. Patient points to the midline of his abdomen as source of pain and then runs his hand across the entire abdomen. Pain radiates to the back. Pain is increased with movement. Patient has not identified any alleviating factors as yet. Patient has a history of benign hernia surgery x2 in the past.  Associated symptoms include nausea. Patient's last bowel movement was yesterday and was soft. Patient denies vomiting, black or bloody stools, difficulty urinating, burning with urination, or fever. HPI    NursingNotes were reviewed. REVIEW OF SYSTEMS    (2-9 systems for level 4, 10 or more for level 5)     Review of Systems   Constitutional:  Negative for chills and fever. HENT:  Negative for congestion and rhinorrhea. Eyes:  Negative for discharge. Respiratory:  Negative for cough and shortness of breath. Cardiovascular:  Negative for chest pain and palpitations. Gastrointestinal:  Positive for abdominal pain and nausea. Negative for blood in stool and vomiting. Genitourinary:  Negative for difficulty urinating and dysuria. Musculoskeletal:  Negative for back pain and neck pain. Skin:  Negative for color change and pallor. Neurological:  Negative for syncope and light-headedness.    Psychiatric/Behavioral: Negative for agitation and confusion. All other systems reviewed and are negative. PAST MEDICALHISTORY     Past Medical History:   Diagnosis Date    Acid reflux     Adenomatous goiter     2013 left thyroidectomy  Dr Marcus Villaseñor    Arthralgia of multiple sites     Arthritis     osteo    Chronic back pain     COVID-19     Effusion of patella     Gastric ulcer     Knee pain     Medial meniscus tear     Mixed hyperlipidemia     Obesity     Pain of right hand     Postoperative hypothyroidism     left lobectomy, Dr Marcus Villaseñor     Prediabetes     Prolonged emergence from general anesthesia     fights upon emergence; works in [employer], woke feeling he was at work and being threatened.     Simple goiter     Solitary thyroid nodule     Thyroid disease          SURGICAL HISTORY       Past Surgical History:   Procedure Laterality Date    CHOLECYSTECTOMY, LAPAROSCOPIC      HERNIA REPAIR      umbilical    INGUINAL HERNIA REPAIR Left     age 9    KNEE ARTHROSCOPY Right     x2    KNEE CARTILAGE SURGERY Left 5/12/2016    ARTHROSCOPY PARTIAL MEDIAL MENISCECTOMY KNEE performed by Edison Dietz DO at [facility] 59      wisdom teeth removed    THYROIDECTOMY, PARTIAL      Left thyroidectomy, Dr. Marcus Villaseñor, 2013    [address]     Current Discharge Medication List        CONTINUE these medications which have NOT CHANGED    Details   glyBURIDE (DIABETA) 5 MG tablet TAKE 1 TABLET BY MOUTH DAILY (WITH BREAKFAST)  Qty: 30 tablet, Refills: 0      metFORMIN (GLUCOPHAGE) 500 MG tablet Take 1 tablet by mouth 2 times daily (with meals)  Qty: 60 tablet, Refills: 5    Associated Diagnoses: Type 2 diabetes mellitus without complication, without long-term current use of insulin (HCC)      levothyroxine (SYNTHROID) 100 MCG tablet TAKE 1 TABLET BY MOUTH DAILY  Qty: 30 tablet, Refills: 5    Associated Diagnoses: Acquired hypothyroidism      ondansetron Final Result   1. Short segment inflammatory wall thickening 11 mm involving 6.5 cm segment of the distal jejunal loops with mild adjacent fat stranding in the right lumbar region. Few enlarged mesenteric lymph nodes are identified, largest measuring 11 mm. 2. Moderate dilatation of the mid and distal jejunal loops. Ileal loops are collapsed. Ileocecal junction appears unremarkable. Scattered diverticulosis of the transverse colon without evidence of diverticulitis. 3. Post cholecystectomy status. Moderate hepatomegaly with diffuse fatty infiltration of liver. Mild splenomegaly. 4. Pancreas and appendix appear unremarkable except for mild fatty infiltration of pancreas. No evidence of renal or ureteric calculus. Small 18 mm posterior cortical cyst in the left kidney lower pole. No follow-up is indicated. Recommendation: Follow up as clinically indicated. All CT scans at this facility utilize dose modulation, iterative reconstruction, and/or weight based dosing when appropriate to reduce radiation dose to as low as reasonably achievable.    Electronically Signed by Ambika Tran MD at 05-Sep-2022 06:23:29 AM                       LABS:  Labs Reviewed   CBC WITH AUTO DIFFERENTIAL - Abnormal; Notable for the following components:       Result Value    WBC 11.7 (*)     MCHC 32.0 (*)     MPV 9.0 (*)     Neutrophils % 67.4 (*)     Lymphocytes % 19.4 (*)     Eosinophils % 6.4 (*)     Neutrophils Absolute 7.9 (*)     Eosinophils Absolute 0.80 (*)     All other components within normal limits   COMPREHENSIVE METABOLIC PANEL - Abnormal; Notable for the following components:    Glucose 300 (*)     All other components within normal limits   LACTIC ACID - Abnormal; Notable for the following components:    Lactic Acid 2.5 (*)     All other components within normal limits    Narrative:     CALL  [facility] tel. ,  Chemistry results called to and read back by Rose Childress RN in ED, 09/05/2022  05:04, by Hollie Diez URINALYSIS WITH REFLEX TO CULTURE - Abnormal; Notable for the following components:    Ketones, Urine TRACE (*)     All other components within normal limits   POCT GLUCOSE - Abnormal; Notable for the following components:    POC Glucose 252 (*)     All other components within normal limits   COVID-19, RAPID   CULTURE, BLOOD 2   CULTURE, BLOOD 1   CULTURE, BLOOD 1   CULTURE, BLOOD 2   LIPASE   HEMOGLOBIN A1C   LACTIC ACID   POCT GLUCOSE   POCT GLUCOSE   POCT GLUCOSE       All other labs were within normal range or not returned as of this dictation. EMERGENCY DEPARTMENT COURSE and DIFFERENTIAL DIAGNOSIS/MDM:   Vitals:    Vitals:    09/05/22 0411 09/05/22 0540 09/05/22 0656 09/05/22 0705   BP: (!) 145/98 (!) 144/78 (!) 154/76 (!) 148/74   Pulse: 84  78 74   Resp: 20 20 20 20   Temp: 98 °F (36.7 °C)  98 °F (36.7 °C) 98 °F (36.7 °C)   SpO2: 95% 98% 98% 98%   Weight: 289 lb (131.1 kg)      Height: 6' 4\" (1.93 m)          MDM     Amount and/or Complexity of Data Reviewed  Clinical lab tests: reviewed  Tests in the radiology section of CPT®: reviewed    80-year-old male presents with severe abdominal pain beginning approximately 2 hours prior to arrival.  Lab and radiology results reviewed. Patient given IV fluids, IV antibiotics, and IV nausea and pain medication. CT indicates that patient has some enteritis. Despite morphine 4 mg and Dilaudid 1 mg IV patient still has significant pain. Patient is unable to move from his right side on the bed without difficulty. Discussed with Dr. Porsha Gasca, hospitalist, who will admit patient for unrelenting pain in his abdomen. Patient and wife verbalized understanding and agreement with plan of care. CONSULTS:  None    PROCEDURES:  Unless otherwise noted below, none     Procedures    FINAL IMPRESSION      1. Generalized abdominal pain    2.  Enteritis          DISPOSITION/PLAN   DISPOSITION Admitted 09/05/2022 06:38:57 AM               (Please note that portions of this note were completed with a voice recognition program.  Efforts were made to edit thedictations but occasionally words are mis-transcribed.)    Janice Ta MD (electronically signed)  Attending Emergency Physician          Janice Ta MD  09/05/22 2009

## 2022-09-05 NOTE — LETTER
[facility] 3 HAZEL/VAS/MED  [address]  Phone: [phone]             September 8, 2022    Patient: Orville Ji. YOB: 1970   Date of Visit: 9/5/2022       To Whom It May Concern:    Sarah Broussard was seen and treated in our facility  beginning 9/5/2022 until 9/8/2022. He may return to work on 9/12/2022.       Sincerely,       Henry Coleman RN         Signature:__________________________________

## 2022-09-05 NOTE — ED NOTES
Patient placed in a gown Patient placed on cardiac monitor, continuous pulse oximeter, and NIBP monitor.  Monitor alarms on.       Rafi Rosales RN  09/05/22 [id number]

## 2022-09-05 NOTE — PROGRESS NOTES
4 Eyes Skin Assessment    Arden Walker Jr. is being assessed upon: Admission    I agree that To Jackson, RN, along with Buffy Rose RN (either 2 RN's or 1 LPN and 1 RN) have performed a thorough Head to Toe Skin Assessment on the patient. ALL assessment sites listed below have been assessed. Areas assessed by both nurses:     [x]   Head, Face, and Ears   [x]   Shoulders, Back, and Chest  [x]   Arms, Elbows, and Hands   [x]   Coccyx, Sacrum, and Ischium  [x]   Legs, Feet, and Heels    Does the Patient have Skin Breakdown?  No    Trae Prevention initiated: No  Wound Care Orders initiated: No    [facility] nurse consulted for Pressure Injury (Stage 3,4, Unstageable, DTI, NWPT, and Complex wounds) and New or Established Ostomies: No        Primary Nurse eSignature: Samantha Schwartz RN on 9/5/2022 at 12:25 PM      Co-Signer eSignature: Electronically signed by Kingston Rojas RN on 9/5/22 at 12:26 PM CDT

## 2022-09-05 NOTE — PLAN OF CARE
Problem: Discharge Planning  Goal: Discharge to home or other facility with appropriate resources  Outcome: Progressing  Flowsheets  Taken 9/5/2022 1054  Discharge to home or other facility with appropriate resources: Identify barriers to discharge with patient and caregiver  Taken 9/5/2022 1051  Discharge to home or other facility with appropriate resources:   Identify barriers to discharge with patient and caregiver   Arrange for needed discharge resources and transportation as appropriate   Arrange for interpreters to assist at discharge as needed   Identify discharge learning needs (meds, wound care, etc)   Refer to discharge planning if patient needs post-hospital services based on physician order or complex needs related to functional status, cognitive ability or social support system     Problem: Pain  Goal: Verbalizes/displays adequate comfort level or baseline comfort level  Outcome: Progressing     Problem: Safety - Adult  Goal: Free from fall injury  Outcome: Progressing

## 2022-09-05 NOTE — H&P
[facility]      Hospitalist - History & Physical      PCP: DENIS Bravo    Date of Admission: 9/5/2022    Date of Service: 9/5/2022    Chief Complaint: ABD pn    History Of Present Illness: The patient is a 46 y.o. male with PMH of thyroid disease and type 2 diabetes who presented to [facility] ED on 9/5/2022 complaining of severe abdominal pain. He states that he woke up in the night with severe dull abdominal pain. He had episodes of nausea without vomiting. He reports that the pain became so unbearable he reported to the ED for further evaluation. Pain is worsened with movement and radiates to his back. Denies fever or chills. Denies diarrhea, constipation, rectal bleeding, or hematochezia. Denies any suspicious foods or recent ill contacts. He denies any previous symptoms or illnesses of this nature. Further ED work-up revealed chemistries within normal limits. Lactic acid 2.5. Glucose 300. WBCs 11.7-CBC otherwise unremarkable. UA showed trace ketones otherwise negative. COVID swab negative. CT of the abdomen and pelvis with IV contrast revealed short segment inflow Tory wall thickening the distal jejunal loops with mild adjacent fat stranding in the right lumbar region with few enlarged mesenteric lymph nodes. Moderate dilatation of the mid to distal jejunal loops and scattered diverticulosis of the transverse colon. He will be admitted to hospital medicine for septic enteritis.     Past Medical History:        Diagnosis Date    Acid reflux     Adenomatous goiter     2013 left thyroidectomy  Dr Diana Waddell    Arthralgia of multiple sites     Arthritis     osteo    Chronic back pain     COVID-19     Effusion of patella     Gastric ulcer     Knee pain     Medial meniscus tear     Mixed hyperlipidemia     Obesity     Pain of right hand     Postoperative hypothyroidism     left lobectomy, Dr Diana Waddell     Prediabetes     Prolonged emergence from general anesthesia     fights upon emergence; works in [employer], woke feeling he was at work and being threatened. Simple goiter     Solitary thyroid nodule     Thyroid disease        Past Surgical History:        Procedure Laterality Date    CHOLECYSTECTOMY, LAPAROSCOPIC      HERNIA REPAIR      umbilical    INGUINAL HERNIA REPAIR Left     age 9    KNEE ARTHROSCOPY Right     x2    KNEE CARTILAGE SURGERY Left 5/12/2016    ARTHROSCOPY PARTIAL MEDIAL MENISCECTOMY KNEE [facility]      wisdom teeth removed    THYROIDECTOMY, PARTIAL      Left thyroidectomy, Dr. Tasia Olsen, 2013    [facility] Medications:  Prior to Admission medications    Medication Sig Start Date End Date Taking? Authorizing Provider   glyBURIDE (DIABETA) 5 MG tablet TAKE 1 TABLET BY MOUTH DAILY (WITH BREAKFAST) 8/12/22   Scheryl Seip, APRN   metFORMIN (GLUCOPHAGE) 500 MG tablet Take 1 tablet by mouth 2 times daily (with meals) 7/12/22   Scheryl Seip, APRN   levothyroxine (SYNTHROID) 100 MCG tablet TAKE 1 TABLET BY MOUTH DAILY 6/28/22   DENIS Jones   ondansetron (ZOFRAN-ODT) 4 MG disintegrating tablet DISSOLVE 1 TABLET UNDER THE TONGUE EVERY 4 HOURS AS NEEDED FOR NAUSEA OR VOMITING 4/12/22   Historical Provider, MD   clotrimazole-betamethasone (LOTRISONE) 1-0.05 % cream Apply topically 2 times daily. 9/10/21   Scheryl Seip, APRN       Allergies:    Patient has no known allergies. Social History:    The patient currently lives at home with spouse  Tobacco:   reports that he has never smoked. He has quit using smokeless tobacco.  His smokeless tobacco use included chew. Alcohol:   reports no history of alcohol use.   Illicit Drugs: Never    Family History:      Problem Relation Age of Onset    Other Mother         hypothyroidism    Heart Disease Mother     Diabetes Mother     Heart Attack Father     Breast Cancer Maternal Aunt     Cancer Paternal Aunt        Review of Systems:   Review of Systems   Constitutional:  Positive for appetite change and fatigue. Negative for chills, diaphoresis and fever. HENT:  Negative for congestion and ear pain. Eyes:  Negative for visual disturbance. Respiratory:  Negative for cough, shortness of breath and wheezing. Cardiovascular:  Negative for chest pain, palpitations and leg swelling. Gastrointestinal:  Positive for abdominal distention, abdominal pain and vomiting. Negative for blood in stool, constipation, diarrhea and nausea. Endocrine: Negative for cold intolerance and heat intolerance. Genitourinary:  Negative for difficulty urinating, flank pain, frequency and urgency. Musculoskeletal:  Negative for arthralgias and myalgias. Skin:  Negative for color change and wound. Neurological:  Negative for dizziness, syncope, weakness, light-headedness, numbness and headaches. Hematological:  Does not bruise/bleed easily. Psychiatric/Behavioral:  Negative for agitation, confusion and dysphoric mood. 14 point review of systems is negative except as specifically addressed above. Physical Examination:  BP (!) 148/74   Pulse 74   Temp 98 °F (36.7 °C)   Resp 16   Ht 6' 4\" (1.93 m)   Wt 289 lb (131.1 kg)   SpO2 98%   BMI 35.18 kg/m²   Physical Exam  Vitals and nursing note reviewed. Constitutional:       General: He is not in acute distress. Appearance: Normal appearance. He is obese. He is not ill-appearing. Comments: Appears uncomfortable. HENT:      Head: Normocephalic and atraumatic. Right Ear: External ear normal.      Left Ear: External ear normal.      Nose: Nose normal.      Mouth/Throat:      Mouth: Mucous membranes are moist.   Eyes:      Extraocular Movements: Extraocular movements intact. Conjunctiva/sclera: Conjunctivae normal.      Pupils: Pupils are equal, round, and reactive to light.    Cardiovascular:      Rate and Rhythm: Normal rate and regular rhythm. Pulses: Normal pulses. Heart sounds: Normal heart sounds. Pulmonary:      Effort: Pulmonary effort is normal. No respiratory distress. Breath sounds: Normal breath sounds. No wheezing, rhonchi or rales. Abdominal:      General: Bowel sounds are normal. There is distension (More distended in the RUQ and RLQ). Palpations: Abdomen is soft. Tenderness: There is abdominal tenderness (TTP RUQ and RLQ). Musculoskeletal:         General: No swelling, tenderness or deformity. Normal range of motion. Cervical back: Normal range of motion and neck supple. No muscular tenderness. Right lower leg: No edema. Left lower leg: No edema. Skin:     General: Skin is warm and dry. Findings: No bruising or lesion. Neurological:      Mental Status: He is alert and oriented to person, place, and time. Psychiatric:         Mood and Affect: Mood normal.         Behavior: Behavior normal.         Thought Content: Thought content normal.        Diagnostic Data:  CBC:  Recent Labs     09/05/22 0400   WBC 11.7*   HGB 16.0   HCT 50.0        BMP:  Recent Labs     09/05/22 0400      K 4.5   CL 99   CO2 26   BUN 18   CREATININE 0.9   CALCIUM 9.5     Recent Labs     09/05/22 0400   AST 16   ALT 24   BILITOT <0.2   ALKPHOS 92     Coag Panel: No results for input(s): INR, PROTIME, APTT in the last 72 hours. Cardiac Enzymes: No results for input(s): Paulina Fuchs in the last 72 hours. ABGs:No results found for: PHART, PO2ART, EUK7VFD  Urinalysis:  Lab Results   Component Value Date/Time    NITRU Negative 09/05/2022 05:26 AM    BLOODU Negative 09/05/2022 05:26 AM    SPECGRAV >=1.045 09/05/2022 05:26 AM    GLUCOSEU =>1000 09/05/2022 05:26 AM     A1C:   Recent Labs     09/05/22 0400   LABA1C 8.7*     ABG:No results for input(s): PHART, EHM9VEK, PO2ART, ILJ9GZS, BEART, HGBAE, P3FBGDLH, CARBOXHGBART in the last 72 hours.     CT ABDOMEN PELVIS W IV CONTRAST Additional Contrast? None    Result Date: 9/5/2022  NO PRIOR REPORT AVAILABLE Exam: CT OF THE ABDOMEN/PELVIS WITH IV CONTRAST Clinical data: Diffuse abdominal pain and distention. Technique:Direct contiguousaxial CT images were acquired through the abdomen and pelvis with intravenouscontrastusing soft tissue and bone algorithms. Oral contrast was not administered. Reformatted/MPR images were performed. Radiation dose: CTDIvol =28.85 mGy, DLP =1591 mGy x cm. Limitations: Lack of oral contrast limits evaluation of the bowel loops. Prior Studies: No prior studies submitted. Findings: Lung bases: Clear Liver: Moderate to severe hepatomegaly 0.4 cm. No obvious focal lesion. No evidence of mass. No evidence of dilated ducts. Post cholecystectomy status. Spleen: Grossly unremarkable. Pancreas/adrenal glands: Grossly unremarkable size, contour and density for mild fatty infiltration of the pancreas. Kidneys: In anatomic position. Grossly unremarkablerenal size, contour and density. No renal or ureteral calculi. No evidence of a renal mass. A 18 mm cortical cyst from the left kidney lower pole. Perinephric space is unremarkable. Retroperitoneum: No enlarged retroperitoneal lymphadenopathy. The aorta and IVC appear unremarkable. Peritoneal cavity: No evidence of free air or ascites. Gastrointestinal tract: Short segment inflammatory wall thickening 11 mm involving 6.5 cm segment of the distal jejunal loops with mild adjacent fat stranding in the right lumbar region. Few enlarged mesenteric lymph nodes are identified, largest measuring 11 mm. Moderate dilatation of the mid and distal jejunal loops. Ileal loops are collapsed. Ileocecal junction appears unremarkable. Scattered diverticulosis of the transverse colon without evidence of diverticulitis. Appendix: Unremarkable Pelvis: Solid and hollow viscera grossly unremarkable. Osseous structures: No acute or destructive bony process identified.      1. Short segment inflammatory wall thickening 11 mm involving 6.5 cm segment of the distal jejunal loops with mild adjacent fat stranding in the right lumbar region. Few enlarged mesenteric lymph nodes are identified, largest measuring 11 mm. 2. Moderate dilatation of the mid and distal jejunal loops. Ileal loops are collapsed. Ileocecal junction appears unremarkable. Scattered diverticulosis of the transverse colon without evidence of diverticulitis. 3. Post cholecystectomy status. Moderate hepatomegaly with diffuse fatty infiltration of liver. Mild splenomegaly. 4. Pancreas and appendix appear unremarkable except for mild fatty infiltration of pancreas. No evidence of renal or ureteric calculus. Small 18 mm posterior cortical cyst in the left kidney lower pole. No follow-up is indicated. Recommendation: Follow up as clinically indicated. All CT scans at this facility utilize dose modulation, iterative reconstruction, and/or weight based dosing when appropriate to reduce radiation dose to as low as reasonably achievable.  Electronically Signed by Parish Mora MD at 05-Sep-2022 06:23:29 AM               Assessment/Plan:  Principal Problem:    Septic enteritis  -Admit to med/surg-Full code   -NPO except sips and chips of ice   -Zosyn 3.375 g IV Q6 hours   -Dilaudid pain panel prn   -Zofran prn N/V   -Lovenox SQ for VTE prophylaxis   -Vitals per unit routine   -Strict I&O   -Up as tolerated   -LR at 150ml/hr   -electrolyte replacement protocol   -Labs in the AM   -Continue to monitor and manage with supportive medical care     Active Problems:    Type 2 diabetes mellitus without complication, without long-term current use of insulin (HCC)/  Elevated glucose   -Continue home lantus dose   -Hold Metformin and Glyburide   -Medium dose corrective protocol   -Accuchecks      Lactic acidosis   -IVF's   -Repeat lactic acid at 1200   -Repeat in the AM       Leukocytosis/Neutrophilia   -likely secondary to infection   -Monitor vital signs   -Daily labs      Hypothyroidism   -Continue home dose of levothyroxine   -TSH with T4 in the AM     Resolved Problems:    * No resolved hospital problems.  *       Signed:  DENIS Frost, 9/5/2022 10:40 AM

## 2022-09-05 NOTE — PLAN OF CARE
EP Sign Out:    Septic Enteritis  Firm Abdomen  No Hx obstruction  Passing stools, last was soft one yesterday  Uncertain if passes gas still      Preliminary Assessments & Plans:    Septic Enteritis:  Admit to medical [facility]  Zosyn 4.5g IV Q8h  CBC with Diff daily  BMP with Mag reflex daily  NPO and advance as tolerated towards diabetic diet  Stroct Is and Os  Daily weights  elevate HoB  Elevate Legs  LR at 150cc/h    Hx DM type 2:  HOLD metformin as had contrast  Lantus 10 units now x once, then 15 nightly  Medium Dose ISS  POCT schedukled TIDWM and QHS as well as PRN  Diabetic diet modifier    Hx Hypothyroididm:  Continue Synthoroid 100mcg  PO Qday    DVT PPx: Lovenox SQ

## 2022-09-06 ENCOUNTER — APPOINTMENT (OUTPATIENT)
Dept: CT IMAGING | Age: 52
DRG: 392 | End: 2022-09-06
Payer: COMMERCIAL

## 2022-09-06 ENCOUNTER — APPOINTMENT (OUTPATIENT)
Dept: GENERAL RADIOLOGY | Age: 52
DRG: 392 | End: 2022-09-06
Payer: COMMERCIAL

## 2022-09-06 LAB
ANION GAP SERPL CALCULATED.3IONS-SCNC: 11 MMOL/L (ref 7–19)
BASOPHILS ABSOLUTE: 0.1 K/UL (ref 0–0.2)
BASOPHILS RELATIVE PERCENT: 0.7 % (ref 0–1)
BUN BLDV-MCNC: 15 MG/DL (ref 6–20)
CALCIUM SERPL-MCNC: 8.5 MG/DL (ref 8.6–10)
CHLORIDE BLD-SCNC: 98 MMOL/L (ref 98–111)
CO2: 28 MMOL/L (ref 22–29)
CREAT SERPL-MCNC: 0.8 MG/DL (ref 0.5–1.2)
EOSINOPHILS ABSOLUTE: 0.7 K/UL (ref 0–0.6)
EOSINOPHILS RELATIVE PERCENT: 8.1 % (ref 0–5)
GFR AFRICAN AMERICAN: >59
GFR NON-AFRICAN AMERICAN: >60
GLUCOSE BLD-MCNC: 140 MG/DL (ref 70–99)
GLUCOSE BLD-MCNC: 145 MG/DL (ref 70–99)
GLUCOSE BLD-MCNC: 149 MG/DL (ref 70–99)
GLUCOSE BLD-MCNC: 172 MG/DL (ref 74–109)
HCT VFR BLD CALC: 43.7 % (ref 42–52)
HEMOGLOBIN: 13.6 G/DL (ref 14–18)
IMMATURE GRANULOCYTES #: 0.1 K/UL
LACTIC ACID: 1.4 MMOL/L (ref 0.5–1.9)
LYMPHOCYTES ABSOLUTE: 1.9 K/UL (ref 1.1–4.5)
LYMPHOCYTES RELATIVE PERCENT: 20.9 % (ref 20–40)
MCH RBC QN AUTO: 27.4 PG (ref 27–31)
MCHC RBC AUTO-ENTMCNC: 31.1 G/DL (ref 33–37)
MCV RBC AUTO: 88.1 FL (ref 80–94)
MONOCYTES ABSOLUTE: 0.6 K/UL (ref 0–0.9)
MONOCYTES RELATIVE PERCENT: 6.1 % (ref 0–10)
NEUTROPHILS ABSOLUTE: 5.7 K/UL (ref 1.5–7.5)
NEUTROPHILS RELATIVE PERCENT: 63.4 % (ref 50–65)
PDW BLD-RTO: 13.6 % (ref 11.5–14.5)
PERFORMED ON: ABNORMAL
PLATELET # BLD: 189 K/UL (ref 130–400)
PMV BLD AUTO: 9.1 FL (ref 9.4–12.4)
POTASSIUM REFLEX MAGNESIUM: 4 MMOL/L (ref 3.5–5)
RBC # BLD: 4.96 M/UL (ref 4.7–6.1)
SODIUM BLD-SCNC: 137 MMOL/L (ref 136–145)
TSH REFLEX FT4: 1.37 UIU/ML (ref 0.35–5.5)
WBC # BLD: 9 K/UL (ref 4.8–10.8)

## 2022-09-06 PROCEDURE — 74018 RADEX ABDOMEN 1 VIEW: CPT

## 2022-09-06 PROCEDURE — 83605 ASSAY OF LACTIC ACID: CPT

## 2022-09-06 PROCEDURE — 99221 1ST HOSP IP/OBS SF/LOW 40: CPT | Performed by: SPECIALIST

## 2022-09-06 PROCEDURE — 74174 CTA ABD&PLVS W/CONTRAST: CPT

## 2022-09-06 PROCEDURE — 1210000000 HC MED SURG R&B

## 2022-09-06 PROCEDURE — 80048 BASIC METABOLIC PNL TOTAL CA: CPT

## 2022-09-06 PROCEDURE — 82947 ASSAY GLUCOSE BLOOD QUANT: CPT

## 2022-09-06 PROCEDURE — 74174 CTA ABD&PLVS W/CONTRAST: CPT | Performed by: RADIOLOGY

## 2022-09-06 PROCEDURE — 74019 RADEX ABDOMEN 2 VIEWS: CPT | Performed by: RADIOLOGY

## 2022-09-06 PROCEDURE — 84443 ASSAY THYROID STIM HORMONE: CPT

## 2022-09-06 PROCEDURE — 85025 COMPLETE CBC W/AUTO DIFF WBC: CPT

## 2022-09-06 PROCEDURE — 6370000000 HC RX 637 (ALT 250 FOR IP): Performed by: HOSPITALIST

## 2022-09-06 PROCEDURE — 6360000002 HC RX W HCPCS: Performed by: HOSPITALIST

## 2022-09-06 PROCEDURE — 2580000003 HC RX 258: Performed by: HOSPITALIST

## 2022-09-06 PROCEDURE — 36415 COLL VENOUS BLD VENIPUNCTURE: CPT

## 2022-09-06 PROCEDURE — 6370000000 HC RX 637 (ALT 250 FOR IP): Performed by: NURSE PRACTITIONER

## 2022-09-06 RX ORDER — INSULIN GLARGINE 100 [IU]/ML
17 INJECTION, SOLUTION SUBCUTANEOUS NIGHTLY
Status: DISCONTINUED | OUTPATIENT
Start: 2022-09-06 | End: 2022-09-08 | Stop reason: HOSPADM

## 2022-09-06 RX ADMIN — ACETAMINOPHEN 650 MG: 325 TABLET, FILM COATED ORAL at 08:16

## 2022-09-06 RX ADMIN — SODIUM CHLORIDE, POTASSIUM CHLORIDE, SODIUM LACTATE AND CALCIUM CHLORIDE: 600; 310; 30; 20 INJECTION, SOLUTION INTRAVENOUS at 10:44

## 2022-09-06 RX ADMIN — PIPERACILLIN AND TAZOBACTAM 3375 MG: 3; .375 INJECTION, POWDER, LYOPHILIZED, FOR SOLUTION INTRAVENOUS at 05:59

## 2022-09-06 RX ADMIN — PIPERACILLIN AND TAZOBACTAM 3375 MG: 3; .375 INJECTION, POWDER, LYOPHILIZED, FOR SOLUTION INTRAVENOUS at 13:04

## 2022-09-06 RX ADMIN — HYDROMORPHONE HYDROCHLORIDE 1 MG: 1 INJECTION, SOLUTION INTRAMUSCULAR; INTRAVENOUS; SUBCUTANEOUS at 13:53

## 2022-09-06 RX ADMIN — HYDROMORPHONE HYDROCHLORIDE 1 MG: 1 INJECTION, SOLUTION INTRAMUSCULAR; INTRAVENOUS; SUBCUTANEOUS at 10:45

## 2022-09-06 RX ADMIN — LEVOTHYROXINE SODIUM 100 MCG: 100 TABLET ORAL at 05:59

## 2022-09-06 RX ADMIN — PIPERACILLIN AND TAZOBACTAM 3375 MG: 3; .375 INJECTION, POWDER, LYOPHILIZED, FOR SOLUTION INTRAVENOUS at 20:59

## 2022-09-06 RX ADMIN — HYDROMORPHONE HYDROCHLORIDE 1 MG: 1 INJECTION, SOLUTION INTRAMUSCULAR; INTRAVENOUS; SUBCUTANEOUS at 16:56

## 2022-09-06 RX ADMIN — INSULIN GLARGINE 17 UNITS: 100 INJECTION, SOLUTION SUBCUTANEOUS at 21:01

## 2022-09-06 ASSESSMENT — ENCOUNTER SYMPTOMS
COUGH: 0
WHEEZING: 0
SHORTNESS OF BREATH: 0
ABDOMINAL DISTENTION: 1
VOMITING: 0
BLOOD IN STOOL: 0
NAUSEA: 1
CONSTIPATION: 0
DIARRHEA: 0
ABDOMINAL PAIN: 1
COLOR CHANGE: 0

## 2022-09-06 ASSESSMENT — PAIN DESCRIPTION - LOCATION
LOCATION: ABDOMEN

## 2022-09-06 ASSESSMENT — PAIN - FUNCTIONAL ASSESSMENT: PAIN_FUNCTIONAL_ASSESSMENT: ACTIVITIES ARE NOT PREVENTED

## 2022-09-06 ASSESSMENT — PAIN DESCRIPTION - DESCRIPTORS
DESCRIPTORS: SHARP;STABBING
DESCRIPTORS: CRAMPING

## 2022-09-06 ASSESSMENT — PAIN DESCRIPTION - ORIENTATION
ORIENTATION: MID

## 2022-09-06 ASSESSMENT — PAIN SCALES - GENERAL
PAINLEVEL_OUTOF10: 0
PAINLEVEL_OUTOF10: 7
PAINLEVEL_OUTOF10: 4
PAINLEVEL_OUTOF10: 8
PAINLEVEL_OUTOF10: 9

## 2022-09-06 NOTE — CONSULTS
Pt Name: Britt Galdamez. MRN: [id number]  [id number]  YOB: 1970  Admit Date: 9/5/2022  3:50 AM  Date of evaluation: 9/6/2022  Primary Care Physician: DENIS Yates   [id number]       Requesting Provider: Hospitalist service. GI Consult      Assessment:    Enteritis on imaging, CT scan of the abdomen. Patient on Zosyn. Short segment wall thickening 11 mm involving 6.5 cm segment of the distal jejunal loops with mild adjacent fat stranding in the right lumbar region. Few enlarged mesenteric lymph nodes, largest measuring 11 mm. Etiology not clear. Moderate dilation of the mid and distal jejunal loops. Ileal loops collapsed. Ileocecal junction unremarkable. Diverticulosis of the transverse colon. Postcholecystectomy status. Moderate hepatomegaly with diffuse fatty infiltration liver. Mild splenomegaly. Abdominal pain, nausea, no emesis. Pain worsens with movement and radiates to the back. Impression:    The cause of distal jejunal wall thickening, adjacent fat stranding, enlarged mesenteric lymph nodes and dilation of mid and distal jejunal loops is not clear. The cause could be infectious, due to food poisoning and possible small bowel ischemia. Plan:    CTA of the abdomen. Surgical consultation. Stool PCR. Reason : \"Septic enteritis\". 9/5/2022: Admission history and physical examination: DENIS Apple: Reviewed: Plaint of abdominal pain. HPI: Past medical history of thyroid disease, type 2 diabetes. Presented to ED on 9/5/2022 complaining of severe abdominal pain. He woke up in the night with severe abdominal pain. Had episodes of nausea. No emesis. Pain became unbearable. He reported to the ER. Pain is worsened with movement and radiates to his back. No fever or chills. No diarrhea, constipation, rectal bleeding or hematochezia. Denies any suspicious foods no recent ill contacts.   He denies any previous symptoms or illnesses of this nature. Further work-up in ED reviewed chemistries are normal limits. Lactic acid 2.5. Glucose 300. WBC 11.7 thousand. Urinalysis trace ketones otherwise negative. COVID swab test negative. CT of the abdomen and pelvis with IV contrast revealed short segment wall thickening of the distal jejunum with mild adjacent fat stranding in the right lumbar region with few enlarged mesenteric lymph nodes. GI History:    The patient is a 46 y.o. male. Patient has past medical history of GE reflux, gastric ulcer, diverticulosis cholecystectomy. Patient was admitted through the ER for abdominal pain and nausea. CT scan of the abdomen showed thickening of distal jejunal loops, dilation of mid and distal jejunal loops. Ileocecal junction unremarkable. Diverticulosis of transverse colon. Cholecystectomy status. Hepatomegaly with diffuse fatty liver. Mild splenomegaly. Home medication included: Glipizide, metformin, Synthroid, Zofran. Premedications include: Acetaminophen, calcium carbonate, Lovenox, hydromorphone, insulin, levothyroxine, melatonin, magnesium sulfate, Zofran, oxycodone, Zosyn, GlycoLax, potassium chloride. Patient's wife and patient's nurse at bedside. Patient was in usual state of health until 2-3 days ago when he developed mid abdominal pain and nausea. No hematemesis, melena, hematochezia. Patient has chronic diarrhea since his cholecystectomy. The pain started around midnight. Patient does not member what he ate.  2 days prior to that he ate hamburger. Nobody also ate the same food had similar symptoms. Patient has had EGD years ago. He was told, his stomach was all \"eaten up\". He has had EGD. Results are not known. He never had a colonoscopy. He denies blood, pus, fat in the stools. Never had colonoscopy. He is not known to have celiac disease, ulcerative colitis or Crohn's disease.   He denies personal or family history of celiac disease or IBD. He denies family history of GI malignancy. He denies fever or chills. Last EGD: Years ago possibly 20 years ago. He had EGD. Results of EGD not available. He was told that his stomach was eaten. Never had a colonoscopy. Last Colonoscopy: Never had a colonoscopy. Hospital problems: Type 2 diabetes, septic enteritis, lactic acidosis, leukocytosis, neutrophilia, hypothyroidism, elevated glucose. Nonhospital problems: Benign paroxysmal positional vertigo due to bilateral vestibular disorder, left knee pain, arthritis, left median meniscus tear, hypothyroidism,diverticulosis, hyperlipidemia diverticulitis without perforation or abscess, chronic fatigue, migraine, herpes zoster, hypertension, dyspnea on exertion, class II obesity, COVID 19 infection, groundglass opacity on imaging of the lungs, obstructive sleep apnea, hypersomnia, generalized edema, and sleep-related hypoxia, restrictive lung disease. Medical history: Acid reflux, COVID-19, gastric ulcer. Diagnosis Date    Acid reflux     Adenomatous goiter     2013 left thyroidectomy  Dr Maryuri Mcgee    Arthralgia of multiple sites     Arthritis     osteo    Chronic back pain     COVID-19     Effusion of patella     Gastric ulcer     Knee pain     Medial meniscus tear     Mixed hyperlipidemia     Obesity     Pain of right hand     Postoperative hypothyroidism     left lobectomy, Dr Maryuri Mcgee     Prediabetes     Prolonged emergence from general anesthesia     fights upon emergence; works in [employer], woke feeling he was at work and being threatened. Simple goiter     Solitary thyroid nodule     Thyroid disease        Past Surgical History:    Laparoscopic cholecystectomy, hernia repair, inguinal hernia repair, EGD. Not given, ventral hernia repair.         Procedure Laterality Date    CHOLECYSTECTOMY, LAPAROSCOPIC      HERNIA REPAIR      umbilical    INGUINAL HERNIA REPAIR Left     age 9    KNEE ARTHROSCOPY Right     x2 alternative oral replacement **OR** potassium chloride, magnesium sulfate, polyethylene glycol, melatonin, calcium carbonate      Social History     Socioeconomic History    Marital status:      Spouse name: Not on file    Number of children: Not on file    Years of education: Not on file    Highest education level: Not on file   Occupational History    Occupation:    Tobacco Use    Smoking status: Never    Smokeless tobacco: Former     Types: Chew   Vaping Use    Vaping Use: Never used   Substance and Sexual Activity    Alcohol use: No    Drug use: No    Sexual activity: Yes     Partners: Female   Other Topics Concern    Not on file   Social History Narrative    Not on file     Social Determinants of Health     Financial Resource Strain: Not on file   Food Insecurity: Not on file   Transportation Needs: Not on file   Physical Activity: Not on file   Stress: Not on file   Social Connections: Not on file   Intimate Partner Violence: Not on file   Housing Stability: Not on file       Please see: Mother hypothyroidism, heart disease and diabetes. Father heart attack. Maternal aunt breast cancer. Paternal aunt cancer. Family History   Problem Relation Age of Onset    Other Mother         hypothyroidism    Heart Disease Mother     Diabetes Mother     Heart Attack Father     Breast Cancer Maternal Aunt     Cancer Paternal Aunt        ROS:    A pertinent review of all body system was done. Review of systems is as per HPI. Rest of the review of systems either negative, not pertinent or patient was unable to provide review of systems. Physical Exam:      Vitals:    09/05/22 1746 09/05/22 1908 09/06/22 0047 09/06/22 0602   BP: 103/67  125/87 (!) 134/90   Pulse: 64  56 83   Resp: 18 18 17 17   Temp: 97.7 °F (36.5 °C)  97.5 °F (36.4 °C) (!) 96.6 °F (35.9 °C)   TempSrc: Oral      SpO2: 94%  96% 97%   Weight:       Height:             General appearance: Moderately built and nourished.       Head: normocephalic. No jaundice. Neck: Supple. Abdomen: Obese. Midline vertical scar from previous abdominal surgery/ventral hernia repair. There is a tenderness over the mid abdomen. No rebound or point tenderness. Tenderness is to palpation. No gross organomegaly or masses. Extremities: No leg edema. Skin: No jaundice. Neurologic: Alert and oriented. Labs:       Recent Labs     09/05/22  0400 09/06/22  0242   WBC 11.7* 9.0   RBC 5.93 4.96   HGB 16.0 13.6*   HCT 50.0 43.7   MCV 84.3 88.1   MCH 27.0 27.4   MCHC 32.0* 31.1*    189       @LABRCNT(NA:3,K:3,ANIONGAP:3,CL:3,CO2:3,BUN:3,CREATININE:3,GLUCOSE:3,    GFR:3,CALCIUM:3)@    No results for input(s): MG, PHOS in the last 72 hours. Recent Labs     09/05/22  0400   AST 16   ALT 24   BILITOT <0.2   ALKPHOS 92       HgBA1c:  No components found for: HGBA1C    FLP:    Lab Results   Component Value Date/Time    TRIG 107 10/09/2020 08:46 AM    HDL 41 10/09/2020 08:46 AM    LDLCALC 114 10/09/2020 08:46 AM    LDLDIRECT 128 07/06/2017 08:41 AM       TSH:    Lab Results   Component Value Date/Time    TSH 4.480 03/16/2021 11:26 AM       Troponin T: No results for input(s): TROPONINI in the last 72 hours. INR: No results for input(s): INR in the last 72 hours. Recent Labs     09/05/22  0400   LIPASE 32         Radiology:    Reviewed available results of the pertinent imaging studies. 9/5/22: CT abdomen and pelvis with IV contrast:    Short segment inflammatory wall thickening 11 mm involving 6.5 cm segment of the distal jejunal loop with mild adjacent fat stranding in the right lumbar region. Few large mesenteric lymph nodes identified, largest measuring 11 mm. Moderate dilation of the mid and distal jejunal loops. Ileal loops are collapsed. Ileocecal junction appears unremarkable. Scattered diverticulosis of the transverse colon without evidence of diverticulitis. Postcholecystectomy status.   Moderate hepatomegaly with diffuse fatty infiltration of liver. Mild splenomegaly. Pancreas and appendix unremarkable except for mild fatty infiltration of pancreas. Disclaimer speech recognition software       (Please note that portions of this note were completed with a voice recognition program. Efforts were made to edit the dictations but occasionally words are mis-transcribed.)      Thank you for the consultation and allowing me to participate in this patient's care alongside with you!     Arthur Goncalves MD    9/6/2022

## 2022-09-06 NOTE — PROGRESS NOTES
[id number] [facility]      Patient:  Jus Caceres. YOB: 1970  Date of Service: 9/6/2022  MRN: [id number]   Acct: [de-identified]   Primary Care Physician: DENIS Connor  Advance Directive: Full Code  Admit Date: 9/5/2022       Hospital Day: 1  Portions of this note have been copied forward, however, changed to reflect the most current clinical status of this patient. CHIEF COMPLAINT Abdominal pain    SUBJECTIVE:  States that his pain has improved slightly. He is passing flatus, but very little. CUMULATIVE HOSPITAL STAY:  The patient is a 46 y.o. male with PMH of thyroid disease and type 2 diabetes who presented to [facility] ED on 9/5/2022 complaining of severe abdominal pain. He states that he woke up in the night with severe dull abdominal pain. He had episodes of nausea without vomiting. He reports that the pain became so unbearable he reported to the ED for further evaluation. Pain is worsened with movement and radiates to his back. Denied fever or chills. Denied diarrhea, constipation, rectal bleeding, or hematochezia. Denied any suspicious foods or recent ill contacts. He denied any previous symptoms or illnesses of this nature. Further ED work-up revealed chemistries within normal limits. Lactic acid 2.5. Glucose 300. WBCs 11.7-CBC otherwise unremarkable. UA showed trace ketones otherwise negative. COVID swab negative. CT of the abdomen and pelvis with IV contrast revealed short segment inflammatory wall thickening the distal jejunal loops with mild adjacent fat stranding in the right lumbar region with few enlarged mesenteric lymph nodes. Moderate dilatation of the mid to distal jejunal loops and scattered diverticulosis of the transverse colon. He was admitted to hospital medicine for septic enteritis. He is remained afebrile. His leukocytosis is resolved with a WBC count of 9.0. Lactic acid 1.4 this a.m.   Does continue to have abdominal distention with type O active bowel sounds. GI consulted due to ongoing abdominal pain and distention. Remains afebrile vital signs stable. Review of Systems:   Review of Systems   Constitutional:  Positive for appetite change. Negative for chills, diaphoresis, fatigue and fever. HENT:  Negative for congestion and ear pain. Eyes:  Negative for visual disturbance. Respiratory:  Negative for cough, shortness of breath and wheezing. Cardiovascular:  Negative for chest pain, palpitations and leg swelling. Gastrointestinal:  Positive for abdominal distention, abdominal pain and nausea. Negative for blood in stool, constipation, diarrhea and vomiting. Endocrine: Negative for cold intolerance and heat intolerance. Genitourinary:  Negative for difficulty urinating, flank pain, frequency and urgency. Musculoskeletal:  Negative for arthralgias and myalgias. Skin:  Negative for color change and wound. Neurological:  Negative for dizziness, syncope, weakness, light-headedness, numbness and headaches. Hematological:  Does not bruise/bleed easily. Psychiatric/Behavioral:  Negative for agitation, confusion and dysphoric mood. 14 point review of systems is negative except as specifically addressed above. Objective:   VITALS:  BP (!) 134/90   Pulse 83   Temp (!) 96.6 °F (35.9 °C)   Resp 17   Ht 6' 4\" (1.93 m)   Wt 289 lb (131.1 kg)   SpO2 97%   BMI 35.18 kg/m²   24HR INTAKE/OUTPUT:    Intake/Output Summary (Last 24 hours) at 9/6/2022 1139  Last data filed at 9/5/2022 1421  Gross per 24 hour   Intake 0 ml   Output --   Net 0 ml       Physical Exam  Constitutional:       General: He is not in acute distress. Appearance: Normal appearance. He is obese. He is not ill-appearing. HENT:      Head: Normocephalic and atraumatic.       Right Ear: External ear normal.      Left Ear: External ear normal.      Nose: Nose normal.      Mouth/Throat:      Mouth: Mucous membranes are moist.   Eyes:      Extraocular Movements: Extraocular movements intact. Conjunctiva/sclera: Conjunctivae normal.      Pupils: Pupils are equal, round, and reactive to light. Cardiovascular:      Rate and Rhythm: Normal rate and regular rhythm. Pulses: Normal pulses. Heart sounds: Normal heart sounds. Pulmonary:      Effort: Pulmonary effort is normal. No respiratory distress. Breath sounds: Normal breath sounds. No wheezing, rhonchi or rales. Abdominal:      General: Bowel sounds are normal. There is distension. Palpations: Abdomen is soft. Tenderness: There is abdominal tenderness (RUQ RLQ). Musculoskeletal:         General: No swelling, tenderness or deformity. Normal range of motion. Cervical back: Normal range of motion and neck supple. No muscular tenderness. Right lower leg: No edema. Left lower leg: No edema. Skin:     General: Skin is warm and dry. Findings: No bruising or lesion. Neurological:      Mental Status: He is alert and oriented to person, place, and time. Psychiatric:         Mood and Affect: Mood normal.         Behavior: Behavior normal.         Thought Content:  Thought content normal.           Medications:      dextrose      sodium chloride 15 mL/hr at 09/05/22 1332    lactated ringers 150 mL/hr at 09/06/22 1044      insulin glargine  17 Units SubCUTAneous Nightly    levothyroxine  100 mcg Oral Daily    insulin lispro  0-8 Units SubCUTAneous TID WC    insulin lispro  0-4 Units SubCUTAneous Nightly    sodium chloride flush  5-40 mL IntraVENous 2 times per day    enoxaparin  30 mg SubCUTAneous BID    HYDROmorphone  0.5 mg IntraVENous Once    piperacillin-tazobactam  3,375 mg IntraVENous Q8H     glucose, dextrose bolus **OR** dextrose bolus, glucagon (rDNA), dextrose, HYDROmorphone **OR** HYDROmorphone, oxyCODONE **OR** oxyCODONE, naloxone, sodium chloride flush, sodium chloride, ondansetron **OR** ondansetron, acetaminophen **OR** acetaminophen, potassium chloride **OR** potassium alternative oral replacement **OR** potassium chloride, magnesium sulfate, polyethylene glycol, melatonin, calcium carbonate  ADULT DIET; Clear Liquid; 5 carb choices (75 gm/meal)     Lab and other Data:     Recent Labs     09/05/22 0400 09/06/22 0242   WBC 11.7* 9.0   HGB 16.0 13.6*    189     Recent Labs     09/05/22 0400 09/06/22 0242    137   K 4.5 4.0   CL 99 98   CO2 26 28   BUN 18 15   CREATININE 0.9 0.8   GLUCOSE 300* 172*     Recent Labs     09/05/22 0400   AST 16   ALT 24   BILITOT <0.2   ALKPHOS 92     Troponin T: No results for input(s): TROPONINI in the last 72 hours. Pro-BNP: No results for input(s): BNP in the last 72 hours. INR: No results for input(s): INR in the last 72 hours. UA:  Recent Labs     09/05/22 0526   COLORU YELLOW   PHUR 5.5   CLARITYU Clear   SPECGRAV >=1.045   LEUKOCYTESUR Negative   UROBILINOGEN 1.0   BILIRUBINUR Negative   BLOODU Negative   GLUCOSEU =>1000     A1C:   Recent Labs     09/05/22 0400   LABA1C 8.7*     ABG:No results for input(s): PHART, SCO8NUY, PO2ART, LLU6OTV, BEART, HGBAE, I8ICFSJQ, CARBOXHGBART in the last 72 hours. RAD:   CT ABDOMEN PELVIS W IV CONTRAST Additional Contrast? None    Result Date: 9/5/2022  1. Short segment inflammatory wall thickening 11 mm involving 6.5 cm segment of the distal jejunal loops with mild adjacent fat stranding in the right lumbar region. Few enlarged mesenteric lymph nodes are identified, largest measuring 11 mm. 2. Moderate dilatation of the mid and distal jejunal loops. Ileal loops are collapsed. Ileocecal junction appears unremarkable. Scattered diverticulosis of the transverse colon without evidence of diverticulitis. 3. Post cholecystectomy status. Moderate hepatomegaly with diffuse fatty infiltration of liver. Mild splenomegaly. 4. Pancreas and appendix appear unremarkable except for mild fatty infiltration of pancreas. No evidence of renal or ureteric calculus.   Small 18 mm posterior cortical cyst in the left kidney lower pole. No follow-up is indicated. Recommendation: Follow up as clinically indicated. All CT scans at this facility utilize dose modulation, iterative reconstruction, and/or weight based dosing when appropriate to reduce radiation dose to as low as reasonably achievable.  Electronically Signed by Rand Hodge MD at 05-Sep-2022 06:23:29 AM                Assessment/Plan   Principal Problem:    Septic enteritis   -Continue Zosyn   -Consult GI-recommendations appreciated   -KUB now   -IVF's   -Advance diet to clear liquids   -PRN pain meds   -PRN nausea meds   -Daily labs   -Supportive care    Active Problems:    Type 2 diabetes mellitus without complication, without long-term current use of insulin (HCC)   -Continue medium dose coverage   -Increase lantus to 17 units at HS      Hypothyroidism   -continue Levothyroxine      Elevated glucose   -Lantus increased     Resolved Problems:  Lactic acidosis    Leukocytosis    Neutrophilia      Antibiotic: zosyn     DVT Prophylaxis: Lovenox    Disposition: DENIS Jolly, 9/6/2022 11:39 AM

## 2022-09-07 PROBLEM — R10.84 GENERALIZED ABDOMINAL PAIN: Status: ACTIVE | Noted: 2022-09-07

## 2022-09-07 LAB
ADENOVIRUS F 40 41 PCR: NOT DETECTED
ANION GAP SERPL CALCULATED.3IONS-SCNC: 10 MMOL/L (ref 7–19)
ASTROVIRUS PCR: NOT DETECTED
BASOPHILS ABSOLUTE: 0.1 K/UL (ref 0–0.2)
BASOPHILS RELATIVE PERCENT: 0.6 % (ref 0–1)
BUN BLDV-MCNC: 14 MG/DL (ref 6–20)
CALCIUM SERPL-MCNC: 8.6 MG/DL (ref 8.6–10)
CAMPYLOBACTER PCR: NOT DETECTED
CHLORIDE BLD-SCNC: 98 MMOL/L (ref 98–111)
CLOSTRIDIUM DIFFICILE, PCR: NOT DETECTED
CO2: 29 MMOL/L (ref 22–29)
CREAT SERPL-MCNC: 0.8 MG/DL (ref 0.5–1.2)
CRYPTOSPORIDIUM PCR: NOT DETECTED
CYCLOSPORA CAYETANENSIS PCR: NOT DETECTED
E COLI ENTEROAGGREGATIVE PCR: NOT DETECTED
E COLI ENTEROPATHOGENIC PCR: NOT DETECTED
E COLI ENTEROTOXIGENIC PCR: NOT DETECTED
E COLI SHIGELLA/ENTEROINVASIVE PCR: NOT DETECTED
ENTAMOEBA HISTOLYTICA PCR: NOT DETECTED
EOSINOPHILS ABSOLUTE: 0.9 K/UL (ref 0–0.6)
EOSINOPHILS RELATIVE PERCENT: 11.2 % (ref 0–5)
GFR AFRICAN AMERICAN: >59
GFR NON-AFRICAN AMERICAN: >60
GIARDIA LAMBLIA PCR: NOT DETECTED
GLUCOSE BLD-MCNC: 102 MG/DL (ref 70–99)
GLUCOSE BLD-MCNC: 112 MG/DL (ref 70–99)
GLUCOSE BLD-MCNC: 129 MG/DL (ref 74–109)
GLUCOSE BLD-MCNC: 141 MG/DL (ref 70–99)
GLUCOSE BLD-MCNC: 170 MG/DL (ref 70–99)
HCT VFR BLD CALC: 43.8 % (ref 42–52)
HEMOGLOBIN: 13.6 G/DL (ref 14–18)
IMMATURE GRANULOCYTES #: 0.1 K/UL
LYMPHOCYTES ABSOLUTE: 2 K/UL (ref 1.1–4.5)
LYMPHOCYTES RELATIVE PERCENT: 23.3 % (ref 20–40)
MCH RBC QN AUTO: 26.9 PG (ref 27–31)
MCHC RBC AUTO-ENTMCNC: 31.1 G/DL (ref 33–37)
MCV RBC AUTO: 86.7 FL (ref 80–94)
MONOCYTES ABSOLUTE: 0.5 K/UL (ref 0–0.9)
MONOCYTES RELATIVE PERCENT: 6.1 % (ref 0–10)
NEUTROPHILS ABSOLUTE: 4.9 K/UL (ref 1.5–7.5)
NEUTROPHILS RELATIVE PERCENT: 57.8 % (ref 50–65)
NOROVIRUS GI GII PCR: NOT DETECTED
PDW BLD-RTO: 13.5 % (ref 11.5–14.5)
PERFORMED ON: ABNORMAL
PLATELET # BLD: 198 K/UL (ref 130–400)
PLESIOMONAS SHIGELLOIDES PCR: NOT DETECTED
PMV BLD AUTO: 8.9 FL (ref 9.4–12.4)
POTASSIUM REFLEX MAGNESIUM: 4 MMOL/L (ref 3.5–5)
RBC # BLD: 5.05 M/UL (ref 4.7–6.1)
ROTAVIRUS A PCR: NOT DETECTED
SALMONELLA PCR: NOT DETECTED
SAPOVIRUS PCR: NOT DETECTED
SHIGA-LIKE TOXIN-PRODUCING E. COLI (STEC) STX1/STX2: NOT DETECTED
SODIUM BLD-SCNC: 137 MMOL/L (ref 136–145)
VIBRIO CHOLERAE PCR: NOT DETECTED
VIBRIO PCR: NOT DETECTED
WBC # BLD: 8.4 K/UL (ref 4.8–10.8)
YERSINIA ENTEROCOLITICA PCR: NOT DETECTED

## 2022-09-07 PROCEDURE — 6370000000 HC RX 637 (ALT 250 FOR IP): Performed by: NURSE PRACTITIONER

## 2022-09-07 PROCEDURE — 2580000003 HC RX 258: Performed by: HOSPITALIST

## 2022-09-07 PROCEDURE — 85025 COMPLETE CBC W/AUTO DIFF WBC: CPT

## 2022-09-07 PROCEDURE — 99221 1ST HOSP IP/OBS SF/LOW 40: CPT | Performed by: SURGERY

## 2022-09-07 PROCEDURE — 6360000002 HC RX W HCPCS: Performed by: HOSPITALIST

## 2022-09-07 PROCEDURE — 1210000000 HC MED SURG R&B

## 2022-09-07 PROCEDURE — 82947 ASSAY GLUCOSE BLOOD QUANT: CPT

## 2022-09-07 PROCEDURE — 80048 BASIC METABOLIC PNL TOTAL CA: CPT

## 2022-09-07 PROCEDURE — 36415 COLL VENOUS BLD VENIPUNCTURE: CPT

## 2022-09-07 PROCEDURE — 6370000000 HC RX 637 (ALT 250 FOR IP): Performed by: HOSPITALIST

## 2022-09-07 PROCEDURE — 87507 IADNA-DNA/RNA PROBE TQ 12-25: CPT

## 2022-09-07 PROCEDURE — 99232 SBSQ HOSP IP/OBS MODERATE 35: CPT | Performed by: SPECIALIST

## 2022-09-07 RX ORDER — DOCUSATE SODIUM 100 MG/1
100 CAPSULE, LIQUID FILLED ORAL 2 TIMES DAILY
Status: DISCONTINUED | OUTPATIENT
Start: 2022-09-07 | End: 2022-09-08 | Stop reason: HOSPADM

## 2022-09-07 RX ADMIN — OXYCODONE 5 MG: 5 TABLET ORAL at 01:31

## 2022-09-07 RX ADMIN — Medication 5 ML: at 08:47

## 2022-09-07 RX ADMIN — INSULIN GLARGINE 17 UNITS: 100 INJECTION, SOLUTION SUBCUTANEOUS at 20:19

## 2022-09-07 RX ADMIN — OXYCODONE 10 MG: 5 TABLET ORAL at 20:18

## 2022-09-07 RX ADMIN — DOCUSATE SODIUM 100 MG: 100 CAPSULE, LIQUID FILLED ORAL at 20:17

## 2022-09-07 RX ADMIN — PIPERACILLIN AND TAZOBACTAM 3375 MG: 3; .375 INJECTION, POWDER, LYOPHILIZED, FOR SOLUTION INTRAVENOUS at 05:58

## 2022-09-07 RX ADMIN — Medication 5 MG: at 20:18

## 2022-09-07 RX ADMIN — PIPERACILLIN AND TAZOBACTAM 3375 MG: 3; .375 INJECTION, POWDER, LYOPHILIZED, FOR SOLUTION INTRAVENOUS at 17:19

## 2022-09-07 RX ADMIN — ACETAMINOPHEN 650 MG: 325 TABLET, FILM COATED ORAL at 20:18

## 2022-09-07 RX ADMIN — LEVOTHYROXINE SODIUM 100 MCG: 100 TABLET ORAL at 08:46

## 2022-09-07 ASSESSMENT — ENCOUNTER SYMPTOMS
NAUSEA: 1
SHORTNESS OF BREATH: 0
ABDOMINAL DISTENTION: 1
COLOR CHANGE: 0
BLOOD IN STOOL: 0
SORE THROAT: 0
VOMITING: 0
CONSTIPATION: 0
COUGH: 0
ABDOMINAL PAIN: 1
EYE PAIN: 0
WHEEZING: 0
DIARRHEA: 0
EYE REDNESS: 0

## 2022-09-07 ASSESSMENT — PAIN DESCRIPTION - DESCRIPTORS
DESCRIPTORS: ACHING;CRAMPING;DISCOMFORT
DESCRIPTORS: ACHING;CRAMPING

## 2022-09-07 ASSESSMENT — PAIN SCALES - GENERAL
PAINLEVEL_OUTOF10: 5
PAINLEVEL_OUTOF10: 5
PAINLEVEL_OUTOF10: 1
PAINLEVEL_OUTOF10: 3
PAINLEVEL_OUTOF10: 9

## 2022-09-07 ASSESSMENT — PAIN DESCRIPTION - LOCATION
LOCATION: ABDOMEN
LOCATION: ABDOMEN

## 2022-09-07 ASSESSMENT — PAIN DESCRIPTION - ORIENTATION: ORIENTATION: MID

## 2022-09-07 ASSESSMENT — PAIN DESCRIPTION - PAIN TYPE: TYPE: ACUTE PAIN

## 2022-09-07 ASSESSMENT — PAIN - FUNCTIONAL ASSESSMENT: PAIN_FUNCTIONAL_ASSESSMENT: PREVENTS OR INTERFERES SOME ACTIVE ACTIVITIES AND ADLS

## 2022-09-07 NOTE — CARE COORDINATION
Pt was asleep, but was able to interview the spouse. Spouse states that patient is young and able at home and has no needs. Does not have [facility] or DME and does not require any to be ordered. Went over if they had any worries or concerns about DC and she stated no but that she will reach out if anything is needed. Electronically signed by Elenita Liu on 9/7/2022 at 2:22 PM       09/07/22 5318   Service Assessment   Patient Orientation Other (see comment)  (Was asleep but is at base line per spouse!)   Cognition Alert   History Provided By Significant Other   Primary Caregiver Self   Accompanied By/Relationship Spouse   Support Systems Spouse/Significant Other;Children;Family Members;Friends/Neighbors   PCP Verified by CM Yes   Prior Functional Level Independent in ADLs/IADLs   Current Functional Level Independent in ADLs/IADLs   Can patient return to prior living arrangement Yes   Ability to make needs known: Good   Family able to assist with home care needs: Yes   Social/Functional History   Lives With Spouse   Type of Home Mobile home   Discharge Planning   Type of Residence Trailer/Mobile Home   DME Ordered?  No  (No Needs)   Type of Home Care Services None  (No needs)   Follow Up Appointment: Best Day/Time  Wednesday AM

## 2022-09-07 NOTE — CONSULTS
Mr. Annita Martin is a 46year old male who presented with a chief complaint of acute onset abdominal pain. The pain started the night before admission. It was dull and constant, located in the mid abdomen. Worsened with movement. Associated with nausea. Denies diarrhea or constipation. CT was done at admission that showed a short segment of small bowel with wall thickening. GI was consulted, they were concerned about possibility of internal hernia. CTA was ordered and general surgery was consulted. CTA showed decrease in the small bowel wall thickening, no evidence of obstruction. The patient reports that his abdominal pain is some better. He is passing some flatus and has had a bowel movement. Past Medical History:   Diagnosis Date    Acid reflux     Adenomatous goiter     2013 left thyroidectomy  Dr Shyann Carroll    Arthralgia of multiple sites     Arthritis     osteo    Chronic back pain     COVID-19     Effusion of patella     Gastric ulcer     Knee pain     Medial meniscus tear     Mixed hyperlipidemia     Obesity     Pain of right hand     Postoperative hypothyroidism     left lobectomy, Dr Shyann Carroll     Prediabetes     Prolonged emergence from general anesthesia     fights upon emergence; works in [employer], woke feeling he was at work and being threatened.     Simple goiter     Solitary thyroid nodule     Thyroid disease      Past Surgical History:   Procedure Laterality Date    CHOLECYSTECTOMY, LAPAROSCOPIC      HERNIA REPAIR      umbilical    INGUINAL HERNIA REPAIR Left     age 9    KNEE ARTHROSCOPY Right     x2    KNEE CARTILAGE SURGERY Left 5/12/2016    ARTHROSCOPY PARTIAL MEDIAL MENISCECTOMY KNEE performed by Mayco Valenzuela DO at [address]      wisdom teeth removed    THYROIDECTOMY, PARTIAL      Left thyroidectomy, Dr. Shyann Carroll, 2013    [address]       Current Facility-Administered Medications   Medication Dose Route Frequency Provider Last Rate Last Admin    insulin glargine (LANTUS) injection vial 17 Units  17 Units SubCUTAneous Nightly DENIS Chavez   17 Units at 09/06/22 2101    levothyroxine (SYNTHROID) tablet 100 mcg  100 mcg Oral Daily Alesha De Dios MD   100 mcg at 09/07/22 0846    insulin lispro (HUMALOG) injection vial 0-8 Units  0-8 Units SubCUTAneous TID WC Alesha De Dios MD   4 Units at 09/05/22 0840    insulin lispro (HUMALOG) injection vial 0-4 Units  0-4 Units SubCUTAneous Nightly Alesha De Dios MD        glucose chewable tablet 16 g  4 tablet Oral PRN Alesha De Dios MD        dextrose bolus 10% 125 mL  125 mL IntraVENous PRN Alesha De Dios MD        Or    dextrose bolus 10% 250 mL  250 mL IntraVENous PRN Alesha De Dios MD        glucagon (rDNA) injection 1 mg  1 mg SubCUTAneous PRN Alesha De Dios MD        dextrose 10 % infusion   IntraVENous Continuous PRN Alesha De Dios MD        HYDROmorphone HCl PF (DILAUDID) injection 0.5 mg  0.5 mg IntraVENous Q3H PRN Alesha De Dios MD   0.5 mg at 09/05/22 6115    Or    HYDROmorphone HCl PF (DILAUDID) injection 1 mg  1 mg IntraVENous Q3H PRN Alesha De Dios MD   1 mg at 09/06/22 1656    oxyCODONE (ROXICODONE) immediate release tablet 5 mg  5 mg Oral Q4H PRN Alesha De Dios MD   5 mg at 09/07/22 0131    Or    oxyCODONE (ROXICODONE) immediate release tablet 10 mg  10 mg Oral Q4H PRN Alesha De Dios MD   10 mg at 09/05/22 0838    naloxone [facility]) injection 0.4 mg  0.4 mg IntraVENous PRN Alesha De Dios MD        sodium chloride flush 0.9 % injection 5-40 mL  5-40 mL IntraVENous 2 times per day Alesha De Dios MD   5 mL at 09/07/22 0847    sodium chloride flush 0.9 % injection 5-40 mL  5-40 mL IntraVENous PRN Alesha De Dios MD        0.9 % sodium chloride infusion   IntraVENous PRN Alesha De Dios MD 15 mL/hr at 09/05/22 1332 New Bag at 09/05/22 1332    enoxaparin Sodium (LOVENOX) injection 30 mg  30 mg SubCUTAneous BID Alesha De Dios MD   30 mg at 09/05/22 2108    ondansetron (ZOFRAN-ODT) disintegrating tablet 4 mg  4 mg Oral Q8H PRN Amie Murdock MD        Or    [facility]ans[facility]ron [facility]) injection 4 mg  4 mg IntraVENous Q6H PRN Amie Murdock MD   4 mg at 09/05/22 1838    acetaminophen (TYLENOL) tablet 650 mg  650 mg Oral Q6H PRN Amie Murdock MD   650 mg at 09/06/22 2233    Or    acetaminophen (TYLENOL) suppository 650 mg  650 mg Rectal Q6H PRN Amie Murdock MD        potassium chloride (KLOR-CON M) extended release tablet 40 mEq  40 mEq Oral PRN Amie Murdock MD        Or    potassium bicarb-citric acid (EFFER-K) effervescent tablet 40 mEq  40 mEq Oral PRN Amie Murdock MD        Or    potassium chloride 10 mEq/100 mL IVPB (Peripheral Line)  10 mEq IntraVENous PRN Amie Murdock MD        magnesium sulfate 2000 mg in 50 mL IVPB premix  2,000 mg IntraVENous PRN Amie Murdock MD        polyethylene glycol (GLYCOLAX) packet 17 g  17 g Oral Daily PRN Amie Murdock MD        melatonin disintegrating tablet 5 mg  5 mg Oral Nightly PRN Amie Murdock MD        calcium carbonate (TUMS) chewable tablet 500 mg  500 mg Oral TID PRN Amie Murdock MD        HYDROmorphone HCl PF (DILAUDID) injection 0.5 mg  0.5 mg IntraVENous Once Leslie Calixto MD        lactated ringers infusion   IntraVENous Continuous Amie Murdock  mL/hr at 09/06/22 1044 New Bag at 09/06/22 1044    piperacillin-tazobactam (ZOSYN) 3,375 mg in dextrose 5 % 50 mL IVPB (zmos2ynd)  3,375 mg IntraVENous Q8H Amie Murdock MD   Stopped at 09/07/22 1141     Allergies: Patient has no known allergies. Family History   Problem Relation Age of Onset    Other Mother         hypothyroidism    Heart Disease Mother     Diabetes Mother     Heart Attack Father     Breast Cancer Maternal Aunt     Cancer Paternal Aunt        Social History     Tobacco Use    Smoking status: Never    Smokeless tobacco: Former     Types: Chew   Substance Use Topics    Alcohol use: No       Review of Systems   Constitutional:  Negative for chills and fever. HENT:  Negative for congestion and sore throat. Eyes:  Negative for pain and redness. Respiratory:  Negative for cough and shortness of breath. Cardiovascular:  Negative for chest pain and palpitations. Gastrointestinal:  Positive for abdominal distention, abdominal pain and nausea. Negative for constipation and diarrhea. Endocrine: Negative for polydipsia and polyphagia. Genitourinary:  Negative for dysuria and hematuria. Musculoskeletal:  Negative for arthralgias and myalgias. Skin:  Negative for color change and pallor. Neurological:  Negative for dizziness and headaches. Psychiatric/Behavioral:  Negative for confusion and dysphoric mood. Physical Exam  Vitals reviewed. Constitutional:       General: He is not in acute distress. Appearance: He is obese. HENT:      Head: Normocephalic and atraumatic. Nose: Nose normal.   Eyes:      General: No scleral icterus. Pupils: Pupils are equal, round, and reactive to light. Cardiovascular:      Rate and Rhythm: Normal rate and regular rhythm. Pulmonary:      Effort: Pulmonary effort is normal. No respiratory distress. Abdominal:      General: There is no distension. Palpations: Abdomen is soft. There is no mass. Tenderness: There is abdominal tenderness (along midabdomen and periumbilical). There is no guarding. Hernia: No hernia is present. Musculoskeletal:         General: No swelling. Normal range of motion. Cervical back: Neck supple. No rigidity. Skin:     General: Skin is warm and dry. Neurological:      General: No focal deficit present. Mental Status: He is alert. Mental status is at baseline. Psychiatric:         Mood and Affect: Mood normal.         Behavior: Behavior normal.     Impression   No jejunal loops dilatation on the current study . Fat stranding is regressed since the prior study. Retroperitoneal and mesenteric lymphadenopathy .  No interval changes     Non complicated colonic diverticulosis . Splenomegaly and fatty hepatomegaly . Cholecystectomy . Recommendation:   Follow up as clinically indicated. All CT scans at this facility utilize dose modulation, iterative reconstruction, and/or weight based dosing when appropriate to reduce radiation dose to as low as reasonably achievable. Electronically Signed by Conner Ramsey MD at 06-Sep-2022 05:35:12 PM        Images personally reviewed by me. No evidence of obstruction. Improving small bowel wall thickening. I can see where there is mesh from a previous hernia repair, no recurrence noted. No evidence of internal hernia or ischemia    CBC:   Lab Results   Component Value Date/Time    WBC 8.4 09/07/2022 03:13 AM    RBC 5.05 09/07/2022 03:13 AM    HGB 13.6 09/07/2022 03:13 AM    HCT 43.8 09/07/2022 03:13 AM    MCV 86.7 09/07/2022 03:13 AM    MCH 26.9 09/07/2022 03:13 AM    MCHC 31.1 09/07/2022 03:13 AM    RDW 13.5 09/07/2022 03:13 AM     09/07/2022 03:13 AM    MPV 8.9 09/07/2022 03:13 AM     BMP:    Lab Results   Component Value Date/Time     09/07/2022 03:13 AM    K 4.0 09/07/2022 03:13 AM    CL 98 09/07/2022 03:13 AM    CO2 29 09/07/2022 03:13 AM    BUN 14 09/07/2022 03:13 AM    LABALBU 4.4 09/05/2022 04:00 AM    CREATININE 0.8 09/07/2022 03:13 AM    CALCIUM 8.6 09/07/2022 03:13 AM    GFRAA >59 09/07/2022 03:13 AM    LABGLOM >60 09/07/2022 03:13 AM    GLUCOSE 129 09/07/2022 03:13 AM     Lactic acid normalized    Assessment and plan:  46year old male with abdominal pain  Findings on CT most consistent with enteritis. No evidence of obstruction or indication for surgical intervention at this time. He does have some enlarged mesenteric lymph nodes. Would recommend patient follow up with their PCP for follow up imaging in about 3 months to see if there is resolution, lymphadenopathy may be reactive to an enteritis.  Okay for diet from general surgery perspective, would recommend bowel regimen as patient does have a decent amount of stool on CT. Other cares per medicine teams.     Deena Gabriel MD  9/7/2022  4:54 PM

## 2022-09-07 NOTE — PROGRESS NOTES
GI  - PROGRESS NOTE    Subjective:   Admit Date: 9/5/2022  PCP: DENIS Maravilla    Patient being seen for: 9/6/2022: GI consult: Enteritis on CT scan. Short segment wall thickening 11 mm involving 6.5 cm distal jejunum with mild adjacent fat stranding in the right lumbar region. Few enlarged mesenteric lymph nodes, largest measuring 11 mm. Etiology not clear. Moderate dilation of the mid and distal jejunal loops. Ileal loops collapsed. Ileocecal junction unremarkable. Diverticulosis of transverse colon. Post cholecystectomy state. Moderate hepatomegaly with diffuse fatty infiltration. Mild splenomegaly. Abdominal pain, nausea. Pain worse with movement and radiated to the back. Because of jejunal wall thickening, fat stranding, enlarged mesenteric lymph nodes and dilation of mid and distal jejunal loops not clear. Cause was felt to be infectious, due to possible food binding and possible small bowel ischemia. Plan was CT of the abdomen and surgical consultation and stool PCR because patient has history of diarrhea. 24 hr events/Interval History: Patient nurse Haley Single in the room. Patient complains of mid abdominal pain. He feels better. Denies nausea and vomiting. He always had diarrhea since his cholecystectomy. Never had EGD or colonoscopy. Never has seen a gastroenterologist for chronic diarrhea. Diarrhea is felt to be post cholecystectomy diarrhea. CT of the abdomen has not shown any large vessel disease. He does show lymphadenopathy. He denies family history of lymphoma. Distention of loops of jejunum has disappeared. 9/7/2022: Electrolytes and GFR normal.    9/5/2022: LFTs normal.    9/7/2022: WBC 8.4, hemoglobin 13.6 and stable. MCV 86. Platelet count 038,259.    9/7/2022: Stool PCR negative. 8/3/2021. GUS negative.   Rheumatoid factor less than 10.    9/6/2022: CTA of the abdomen with and without contrast: No jejunal loop dilation. Fat stranding regressed. Retroperitoneal and mesenteric lymphadenopathy no changes. Colonic diverticulosis. Splenomegaly and fatty hepatomegaly. Cholecystectomy. Recommendation was follow-up as clinically indicated. Medications:    Scheduled Meds:   insulin glargine  17 Units SubCUTAneous Nightly    levothyroxine  100 mcg Oral Daily    insulin lispro  0-8 Units SubCUTAneous TID WC    insulin lispro  0-4 Units SubCUTAneous Nightly    sodium chloride flush  5-40 mL IntraVENous 2 times per day    enoxaparin  30 mg SubCUTAneous BID    HYDROmorphone  0.5 mg IntraVENous Once    piperacillin-tazobactam  3,375 mg IntraVENous Q8H       Continuous Infusions:   dextrose      sodium chloride 15 mL/hr at 09/05/22 1332    lactated ringers 150 mL/hr at 09/06/22 1044       PRN Meds:.glucose, dextrose bolus **OR** dextrose bolus, glucagon (rDNA), dextrose, HYDROmorphone **OR** HYDROmorphone, oxyCODONE **OR** oxyCODONE, naloxone, sodium chloride flush, sodium chloride, ondansetron **OR** ondansetron, acetaminophen **OR** acetaminophen, potassium chloride **OR** potassium alternative oral replacement **OR** potassium chloride, magnesium sulfate, polyethylene glycol, melatonin, calcium carbonate      Labs:     Recent Labs     09/05/22 0400 09/06/22 0242 09/07/22 0313   WBC 11.7* 9.0 8.4   RBC 5.93 4.96 5.05   HGB 16.0 13.6* 13.6*   HCT 50.0 43.7 43.8   MCV 84.3 88.1 86.7   MCH 27.0 27.4 26.9*   MCHC 32.0* 31.1* 31.1*    189 198       Recent Labs     09/05/22  0400 09/06/22  0242 09/07/22  0313    137 137   K 4.5 4.0 4.0   ANIONGAP 11 11 10   CL 99 98 98   CO2 26 28 29   BUN 18 15 14   CREATININE 0.9 0.8 0.8   GLUCOSE 300* 172* 129*   CALCIUM 9.5 8.5* 8.6       No results for input(s): MG, PHOS in the last 72 hours.     Recent Labs     09/05/22 0400   AST 16   ALT 24   BILITOT <0.2   ALKPHOS 92       HgBA1c:  No components found for: HGBA1C    FLP:    Lab Results   Component Value Date/Time    TRIG 107 10/09/2020 08:46 AM    HDL 41 10/09/2020 08:46 AM    LDLCALC 114 10/09/2020 08:46 AM    LDLDIRECT 128 07/06/2017 08:41 AM       TSH:    Lab Results   Component Value Date/Time    TSH 4.480 03/16/2021 11:26 AM       Troponin T: No results for input(s): TROPONINI in the last 72 hours. INR: No results for input(s): INR in the last 72 hours. Recent Labs     09/05/22  0400   LIPASE 32     -----------------------------------------------------------------      Physical Exam:     Vitals:    09/07/22 0138 09/07/22 0508 09/07/22 0535 09/07/22 1216   BP: 118/77 115/79  112/64   Pulse: 69 69  67   Resp: 17 17  16   Temp: 96.8 °F (36 °C) 98.4 °F (36.9 °C)  98.1 °F (36.7 °C)   TempSrc:    Oral   SpO2: 96% 93%  97%   Weight:   288 lb (130.6 kg)    Height:           24HR INTAKE/OUTPUT:    Intake/Output Summary (Last 24 hours) at 9/7/2022 1539  Last data filed at 9/7/2022 1506  Gross per 24 hour   Intake 1640 ml   Output --   Net 1640 ml       General appearance: Alert and oriented. In no acute physical distress. Appears stated age. Head: normal cephalic. No jaundice. Neck: Supple. Abdomen: Obese. Midline well-healed vertical scar from prior surgery. No objective tenderness. Patient does have subjective pain/tenderness in the mid abdomen. Extremities: No leg edema. Skin: No jaundice. Neurologic: Alert and oriented. Impression:       Dilated jejunal loops not seen on CTA of 9/6/2022. CT abdomen pelvis on 9/5/2022 had shown thickening of the jejunal loops. Stool PCR is negative for infection. Patient may have had viral gastroenteritis or may have had food poisoning. However we cannot prove viral gastroenteritis or food poisoning because of lack of corroborating evidence. Patient does not appear to have celiac disease of the small intestine. The lymphadenopathy seen could be due to recent viral gastroenteritis. Enlarged mesenteric lymph nodes.   Patient will need repeat CAT scan in 1-3 months to prove resolution. It would be a good idea to consult hematology oncology to make sure patient does not have lymphoma. Diverticulosis coli. Plan:     CT enterography at some point in time to rule out Crohn's disease of small intestine. Suggest hematology oncology evaluation because of mesenteric lymphadenopathy. Repeat CT scan of the abdomen at some point in time in future to follow-up on mesenteric lymphadenopathy. Await surgical consultation.   Eusebio Thorne MD    9/7/2022    3:40 PM        Disclaimer speech recognition software       (Please note that portions of this note were completed with a voice recognition program. Efforts were made to edit the dictations but occasionally words are mis-transcribed.)

## 2022-09-07 NOTE — PROGRESS NOTES
[facility]      Patient:  Marianela Dubose. YOB: 1970  Date of Service: 9/7/2022  MRN: [id number]   Acct: [de-identified]   Primary Care Physician: DENIS Cruz  Advance Directive: Full Code  Admit Date: 9/5/2022       Hospital Day: 2  Portions of this note have been copied forward, however, changed to reflect the most current clinical status of this patient. CHIEF COMPLAINT Abdominal pain    SUBJECTIVE:  Abdominal pain and distention improved. He did have a soft stool this am.     [facility]:  The patient is a 46 y.o. male with PMH of thyroid disease and type 2 diabetes who presented to [address] [place] [address] ED on 9/5/2022 complaining of severe abdominal pain. He states that he woke up in the night with severe dull abdominal pain. He had episodes of nausea without vomiting. He reports that the pain became so unbearable he reported to the ED for further evaluation. Pain is worsened with movement and radiates to his back. Denied fever or chills. Denied diarrhea, constipation, rectal bleeding, or hematochezia. Denied any suspicious foods or recent ill contacts. He denied any previous symptoms or illnesses of this nature. Further ED work-up revealed chemistries within normal limits. Lactic acid 2.5. Glucose 300. WBCs 11.7-CBC otherwise unremarkable. UA showed trace ketones otherwise negative. COVID swab negative. CT of the abdomen and pelvis with IV contrast revealed short segment inflammatory wall thickening the distal jejunal loops with mild adjacent fat stranding in the right lumbar region with few enlarged mesenteric lymph nodes. Moderate dilatation of the mid to distal jejunal loops and scattered diverticulosis of the transverse colon. He was admitted to hospital medicine for septic enteritis. He is remained afebrile. His leukocytosis is resolved with a WBC count of 8.4. Does continue to have abdominal distention with type active bowel sounds.   GI was consulted recommended CTA and surgical consultation. CTA showed improvement of that stranding and resolution of enteritis with continued mesenteric lymphadenopathy. General surgery okay with regular diet recommend bowel regimen-Colace added. Review of Systems:   Review of Systems   Constitutional:  Positive for appetite change. Negative for chills, diaphoresis, fatigue and fever. HENT:  Negative for congestion and ear pain. Eyes:  Negative for visual disturbance. Respiratory:  Negative for cough, shortness of breath and wheezing. Cardiovascular:  Negative for chest pain, palpitations and leg swelling. Gastrointestinal:  Positive for abdominal distention, abdominal pain and nausea. Negative for blood in stool, constipation, diarrhea and vomiting. Endocrine: Negative for cold intolerance and heat intolerance. Genitourinary:  Negative for difficulty urinating, flank pain, frequency and urgency. Musculoskeletal:  Negative for arthralgias and myalgias. Skin:  Negative for color change and wound. Neurological:  Negative for dizziness, syncope, weakness, light-headedness, numbness and headaches. Hematological:  Does not bruise/bleed easily. Psychiatric/Behavioral:  Negative for agitation, confusion and dysphoric mood. 14 point review of systems is negative except as specifically addressed above. Objective:   VITALS:  /79   Pulse 69   Temp 98.4 °F (36.9 °C)   Resp 17   Ht 6' 4\" (1.93 m)   Wt 288 lb (130.6 kg)   SpO2 93%   BMI 35.06 kg/m²   24HR INTAKE/OUTPUT:    Intake/Output Summary (Last 24 hours) at 9/7/2022 1204  Last data filed at 9/7/2022 0950  Gross per 24 hour   Intake 1020 ml   Output --   Net 1020 ml         Physical Exam  Constitutional:       General: He is not in acute distress. Appearance: Normal appearance. He is obese. He is not ill-appearing. HENT:      Head: Normocephalic and atraumatic.       Right Ear: External ear normal.      Left Ear: External ear normal.      Nose: Nose normal.      Mouth/Throat:      Mouth: Mucous membranes are moist.   Eyes:      Extraocular Movements: Extraocular movements intact. Conjunctiva/sclera: Conjunctivae normal.      Pupils: Pupils are equal, round, and reactive to light. Cardiovascular:      Rate and Rhythm: Normal rate and regular rhythm. Pulses: Normal pulses. Heart sounds: Normal heart sounds. Pulmonary:      Effort: Pulmonary effort is normal. No respiratory distress. Breath sounds: Normal breath sounds. No wheezing, rhonchi or rales. Abdominal:      General: Bowel sounds are normal. There is distension (Improved). Palpations: Abdomen is soft. Tenderness: There is abdominal tenderness (Minimal). Musculoskeletal:         General: No swelling, tenderness or deformity. Normal range of motion. Cervical back: Normal range of motion and neck supple. No muscular tenderness. Right lower leg: No edema. Left lower leg: No edema. Skin:     General: Skin is warm and dry. Findings: No bruising or lesion. Neurological:      Mental Status: He is alert and oriented to person, place, and time. Psychiatric:         Mood and Affect: Mood normal.         Behavior: Behavior normal.         Thought Content:  Thought content normal.           Medications:      dextrose      sodium chloride 15 mL/hr at 09/05/22 1332    lactated ringers 150 mL/hr at 09/06/22 1044      insulin glargine  17 Units SubCUTAneous Nightly    levothyroxine  100 mcg Oral Daily    insulin lispro  0-8 Units SubCUTAneous TID WC    insulin lispro  0-4 Units SubCUTAneous Nightly    sodium chloride flush  5-40 mL IntraVENous 2 times per day    enoxaparin  30 mg SubCUTAneous BID    HYDROmorphone  0.5 mg IntraVENous Once    piperacillin-tazobactam  3,375 mg IntraVENous Q8H     glucose, dextrose bolus **OR** dextrose bolus, glucagon (rDNA), dextrose, HYDROmorphone **OR** HYDROmorphone, oxyCODONE **OR** oxyCODONE, naloxone, sodium chloride flush, sodium chloride, ondansetron **OR** ondansetron, acetaminophen **OR** acetaminophen, potassium chloride **OR** potassium alternative oral replacement **OR** potassium chloride, magnesium sulfate, polyethylene glycol, melatonin, calcium carbonate  ADULT DIET; Clear Liquid; 5 carb choices (75 gm/meal)     Lab and other Data:     Recent Labs     09/05/22  0400 09/06/22  0242 09/07/22  0313   WBC 11.7* 9.0 8.4   HGB 16.0 13.6* 13.6*    189 198       Recent Labs     09/05/22  0400 09/06/22  0242 09/07/22  0313    137 137   K 4.5 4.0 4.0   CL 99 98 98   CO2 26 28 29   BUN 18 15 14   CREATININE 0.9 0.8 0.8   GLUCOSE 300* 172* 129*       Recent Labs     09/05/22  0400   AST 16   ALT 24   BILITOT <0.2   ALKPHOS 92       Troponin T: No results for input(s): TROPONINI in the last 72 hours. Pro-BNP: No results for input(s): BNP in the last 72 hours. INR: No results for input(s): INR in the last 72 hours. UA:  Recent Labs     09/05/22  0526   COLORU YELLOW   PHUR 5.5   CLARITYU Clear   SPECGRAV >=1.045   LEUKOCYTESUR Negative   UROBILINOGEN 1.0   BILIRUBINUR Negative   BLOODU Negative   GLUCOSEU =>1000       A1C:   Recent Labs     09/05/22  0400   LABA1C 8.7*       ABG:No results for input(s): PHART, GEQ8LXB, PO2ART, STI6KQN, BEART, HGBAE, D9QRADIT, CARBOXHGBART in the last 72 hours. RAD:   CT ABDOMEN PELVIS W IV CONTRAST Additional Contrast? None    Result Date: 9/5/2022  1. Short segment inflammatory wall thickening 11 mm involving 6.5 cm segment of the distal jejunal loops with mild adjacent fat stranding in the right lumbar region. Few enlarged mesenteric lymph nodes are identified, largest measuring 11 mm. 2. Moderate dilatation of the mid and distal jejunal loops. Ileal loops are collapsed. Ileocecal junction appears unremarkable. Scattered diverticulosis of the transverse colon without evidence of diverticulitis. 3. Post cholecystectomy status.   Moderate hepatomegaly with diffuse fatty infiltration of liver. Mild splenomegaly. 4. Pancreas and appendix appear unremarkable except for mild fatty infiltration of pancreas. No evidence of renal or ureteric calculus. Small 18 mm posterior cortical cyst in the left kidney lower pole. No follow-up is indicated. Recommendation: Follow up as clinically indicated. All CT scans at this facility utilize dose modulation, iterative reconstruction, and/or weight based dosing when appropriate to reduce radiation dose to as low as reasonably achievable.  Electronically Signed by Tawanna Buchanan MD at 05-Sep-2022 06:23:29 AM                Assessment/Plan   Principal Problem:    Septic enteritis   -Continue Zosyn   -GI following   -General surgery following   -Colace added   -IVF's   -Advance diet    -PRN pain meds   -PRN nausea meds   -Daily labs   -Supportive care    Active Problems:    Type 2 diabetes mellitus without complication, without long-term current use of insulin (HCC)   -Continue medium dose coverage    -lantus to 17 units at HS      Hypothyroidism   -continue Levothyroxine      Elevated glucose   -Lantus increased     Resolved Problems:  Lactic acidosis    Leukocytosis    Neutrophilia      Antibiotic: zosyn     DVT Prophylaxis: Lovenox    Disposition: TBD-likely tomorrow    Deloris Stockton, DENIS, 9/7/2022 12:04 PM

## 2022-09-08 ENCOUNTER — TELEPHONE (OUTPATIENT)
Dept: GASTROENTEROLOGY | Age: 52
End: 2022-09-08

## 2022-09-08 VITALS
HEIGHT: 76 IN | SYSTOLIC BLOOD PRESSURE: 111 MMHG | BODY MASS INDEX: 35.07 KG/M2 | RESPIRATION RATE: 18 BRPM | DIASTOLIC BLOOD PRESSURE: 65 MMHG | HEART RATE: 70 BPM | WEIGHT: 288 LBS | OXYGEN SATURATION: 95 % | TEMPERATURE: 97.5 F

## 2022-09-08 DIAGNOSIS — R59.0 LYMPHADENOPATHY, RETROPERITONEAL: Primary | ICD-10-CM

## 2022-09-08 LAB
ANION GAP SERPL CALCULATED.3IONS-SCNC: 9 MMOL/L (ref 7–19)
BASOPHILS ABSOLUTE: 0.1 K/UL (ref 0–0.2)
BASOPHILS RELATIVE PERCENT: 0.7 % (ref 0–1)
BUN BLDV-MCNC: 14 MG/DL (ref 6–20)
CALCIUM SERPL-MCNC: 8.6 MG/DL (ref 8.6–10)
CHLORIDE BLD-SCNC: 101 MMOL/L (ref 98–111)
CO2: 29 MMOL/L (ref 22–29)
CREAT SERPL-MCNC: 1.1 MG/DL (ref 0.5–1.2)
EOSINOPHILS ABSOLUTE: 1.3 K/UL (ref 0–0.6)
EOSINOPHILS RELATIVE PERCENT: 14.3 % (ref 0–5)
GFR AFRICAN AMERICAN: >59
GFR NON-AFRICAN AMERICAN: >60
GLUCOSE BLD-MCNC: 120 MG/DL (ref 74–109)
GLUCOSE BLD-MCNC: 130 MG/DL (ref 70–99)
GLUCOSE BLD-MCNC: 186 MG/DL (ref 70–99)
HCT VFR BLD CALC: 44 % (ref 42–52)
HEMOGLOBIN: 14 G/DL (ref 14–18)
IMMATURE GRANULOCYTES #: 0.1 K/UL
LYMPHOCYTES ABSOLUTE: 2.2 K/UL (ref 1.1–4.5)
LYMPHOCYTES RELATIVE PERCENT: 25.6 % (ref 20–40)
MCH RBC QN AUTO: 27.5 PG (ref 27–31)
MCHC RBC AUTO-ENTMCNC: 31.8 G/DL (ref 33–37)
MCV RBC AUTO: 86.3 FL (ref 80–94)
MONOCYTES ABSOLUTE: 0.7 K/UL (ref 0–0.9)
MONOCYTES RELATIVE PERCENT: 8 % (ref 0–10)
NEUTROPHILS ABSOLUTE: 4.4 K/UL (ref 1.5–7.5)
NEUTROPHILS RELATIVE PERCENT: 50.4 % (ref 50–65)
PDW BLD-RTO: 13.3 % (ref 11.5–14.5)
PERFORMED ON: ABNORMAL
PERFORMED ON: ABNORMAL
PLATELET # BLD: 190 K/UL (ref 130–400)
PMV BLD AUTO: 8.9 FL (ref 9.4–12.4)
POTASSIUM REFLEX MAGNESIUM: 4.2 MMOL/L (ref 3.5–5)
RBC # BLD: 5.1 M/UL (ref 4.7–6.1)
SARS-COV-2, NAAT: NOT DETECTED
SODIUM BLD-SCNC: 139 MMOL/L (ref 136–145)
WBC # BLD: 8.8 K/UL (ref 4.8–10.8)

## 2022-09-08 PROCEDURE — 99222 1ST HOSP IP/OBS MODERATE 55: CPT | Performed by: INTERNAL MEDICINE

## 2022-09-08 PROCEDURE — 80048 BASIC METABOLIC PNL TOTAL CA: CPT

## 2022-09-08 PROCEDURE — 2580000003 HC RX 258: Performed by: HOSPITALIST

## 2022-09-08 PROCEDURE — 87635 SARS-COV-2 COVID-19 AMP PRB: CPT

## 2022-09-08 PROCEDURE — 6360000002 HC RX W HCPCS: Performed by: HOSPITALIST

## 2022-09-08 PROCEDURE — 85025 COMPLETE CBC W/AUTO DIFF WBC: CPT

## 2022-09-08 PROCEDURE — 99232 SBSQ HOSP IP/OBS MODERATE 35: CPT | Performed by: SPECIALIST

## 2022-09-08 PROCEDURE — 6370000000 HC RX 637 (ALT 250 FOR IP): Performed by: HOSPITALIST

## 2022-09-08 PROCEDURE — 82947 ASSAY GLUCOSE BLOOD QUANT: CPT

## 2022-09-08 PROCEDURE — 36415 COLL VENOUS BLD VENIPUNCTURE: CPT

## 2022-09-08 RX ORDER — DICYCLOMINE HYDROCHLORIDE 10 MG/1
20 CAPSULE ORAL 4 TIMES DAILY
Qty: 120 CAPSULE | Refills: 0 | Status: SHIPPED | OUTPATIENT
Start: 2022-09-08

## 2022-09-08 RX ORDER — INSULIN GLARGINE 100 [IU]/ML
17 INJECTION, SOLUTION SUBCUTANEOUS NIGHTLY
Qty: 10 ML | Refills: 0 | Status: SHIPPED | OUTPATIENT
Start: 2022-09-08 | End: 2022-09-15

## 2022-09-08 RX ORDER — OXYCODONE HYDROCHLORIDE 10 MG/1
10 TABLET ORAL EVERY 6 HOURS PRN
Qty: 12 TABLET | Refills: 0 | Status: SHIPPED | OUTPATIENT
Start: 2022-09-08 | End: 2022-09-11

## 2022-09-08 RX ORDER — METRONIDAZOLE 500 MG/1
500 TABLET ORAL 3 TIMES DAILY
Qty: 21 TABLET | Refills: 0 | Status: SHIPPED | OUTPATIENT
Start: 2022-09-08 | End: 2022-09-15

## 2022-09-08 RX ORDER — CIPROFLOXACIN 500 MG/1
500 TABLET, FILM COATED ORAL 2 TIMES DAILY
Qty: 14 TABLET | Refills: 0 | Status: SHIPPED | OUTPATIENT
Start: 2022-09-08 | End: 2022-09-15

## 2022-09-08 RX ORDER — POLYETHYLENE GLYCOL 3350 17 G/17G
17 POWDER, FOR SOLUTION ORAL DAILY
Status: DISCONTINUED | OUTPATIENT
Start: 2022-09-08 | End: 2022-09-08 | Stop reason: HOSPADM

## 2022-09-08 RX ORDER — DOCUSATE SODIUM 100 MG/1
100 CAPSULE, LIQUID FILLED ORAL 2 TIMES DAILY
Qty: 60 CAPSULE | Refills: 0 | Status: SHIPPED | OUTPATIENT
Start: 2022-09-08 | End: 2022-10-08

## 2022-09-08 RX ADMIN — SODIUM CHLORIDE 25 ML: 9 INJECTION, SOLUTION INTRAVENOUS at 02:25

## 2022-09-08 RX ADMIN — LEVOTHYROXINE SODIUM 100 MCG: 100 TABLET ORAL at 06:15

## 2022-09-08 RX ADMIN — PIPERACILLIN AND TAZOBACTAM 3375 MG: 3; .375 INJECTION, POWDER, LYOPHILIZED, FOR SOLUTION INTRAVENOUS at 02:26

## 2022-09-08 RX ADMIN — OXYCODONE 10 MG: 5 TABLET ORAL at 06:15

## 2022-09-08 ASSESSMENT — PAIN SCALES - GENERAL
PAINLEVEL_OUTOF10: 8
PAINLEVEL_OUTOF10: 2

## 2022-09-08 ASSESSMENT — PAIN DESCRIPTION - ORIENTATION: ORIENTATION: MID

## 2022-09-08 ASSESSMENT — PAIN DESCRIPTION - LOCATION: LOCATION: ABDOMEN

## 2022-09-08 NOTE — DISCHARGE SUMMARY
Arden Brushjacoby. :  1970  MRN:  [id number]    Admit date:  2022  Discharge date:  2022    Discharging Physician:  Dr. Mer Knox    Advance Directive: Full Code    Consults: IP CONSULT TO GI  IP CONSULT TO GENERAL SURGERY     Primary Care Physician:  DENIS Torres    Discharge Diagnoses:  Principal Problem:    Septic enteritis  Active Problems:    Type 2 diabetes mellitus without complication, without long-term current use of insulin (HCC)    Generalized abdominal pain    Hypothyroidism    Elevated glucose  Resolved Problems:    * No resolved hospital problems. *      Portions of this note have been copied forward, however, changed to reflect the most current clinical status of this patient. Hospital Course: The patient is a 46 y.o. male with PMH of thyroid disease and type 2 diabetes who presented to [facility] ED on 2022 complaining of severe abdominal pain. He states that he woke up in the night with severe dull abdominal pain. He had episodes of nausea without vomiting. He reports that the pain became so unbearable he reported to the ED for further evaluation. Pain is worsened with movement and radiates to his back. Denied fever or chills. Denied diarrhea, constipation, rectal bleeding, or hematochezia. Denied any suspicious foods or recent ill contacts. He denied any previous symptoms or illnesses of this nature. Further ED work-up revealed chemistries within normal limits. Lactic acid 2.5. Glucose 300. WBCs 11.7-CBC otherwise unremarkable. UA showed trace ketones otherwise negative. COVID swab negative. CT of the abdomen and pelvis with IV contrast revealed short segment inflammatory wall thickening the distal jejunal loops with mild adjacent fat stranding in the right lumbar region with few enlarged mesenteric lymph nodes. Moderate dilatation of the mid to distal jejunal loops and scattered diverticulosis of the transverse colon.   He was admitted to hospital medicine for septic enteritis. He is remained afebrile. His leukocytosis is resolved with WBC-8.8. His chemistries remain stable. His pain has improved, but continues. He has been cleared by GI to go home. He is to follow-up with GI on outpatient basis for follow-up CT. He is to follow-up with hematology for lymphadenopathy for follow-up as outpatient. He is to follow-up with his PCP within 1 week for hospital follow-up. He will be sent home with completion of antibiotics p.o., antispasmodics and pain medication. He will be discharged home in stable condition    Significant Diagnostic Studies:   CT ABDOMEN PELVIS W IV CONTRAST Additional Contrast? None    Result Date: 9/5/2022  NO PRIOR REPORT AVAILABLE Exam: CT OF THE ABDOMEN/PELVIS WITH IV CONTRAST Clinical data: Diffuse abdominal pain and distention. Technique:Direct contiguousaxial CT images were acquired through the abdomen and pelvis with intravenouscontrastusing soft tissue and bone algorithms. Oral contrast was not administered. Reformatted/MPR images were performed. Radiation dose: CTDIvol =28.85 mGy, DLP =1591 mGy x cm. Limitations: Lack of oral contrast limits evaluation of the bowel loops. Prior Studies: No prior studies submitted. Findings: Lung bases: Clear Liver: Moderate to severe hepatomegaly 0.4 cm. No obvious focal lesion. No evidence of mass. No evidence of dilated ducts. Post cholecystectomy status. Spleen: Grossly unremarkable. Pancreas/adrenal glands: Grossly unremarkable size, contour and density for mild fatty infiltration of the pancreas. Kidneys: In anatomic position. Grossly unremarkablerenal size, contour and density. No renal or ureteral calculi. No evidence of a renal mass. A 18 mm cortical cyst from the left kidney lower pole. Perinephric space is unremarkable. Retroperitoneum: No enlarged retroperitoneal lymphadenopathy. The aorta and IVC appear unremarkable. Peritoneal cavity: No evidence of free air or ascites. Gastrointestinal tract: Short segment inflammatory wall thickening 11 mm involving 6.5 cm segment of the distal jejunal loops with mild adjacent fat stranding in the right lumbar region. Few enlarged mesenteric lymph nodes are identified, largest measuring 11 mm. Moderate dilatation of the mid and distal jejunal loops. Ileal loops are collapsed. Ileocecal junction appears unremarkable. Scattered diverticulosis of the transverse colon without evidence of diverticulitis. Appendix: Unremarkable Pelvis: Solid and hollow viscera grossly unremarkable. Osseous structures: No acute or destructive bony process identified. 1. Short segment inflammatory wall thickening 11 mm involving 6.5 cm segment of the distal jejunal loops with mild adjacent fat stranding in the right lumbar region. Few enlarged mesenteric lymph nodes are identified, largest measuring 11 mm. 2. Moderate dilatation of the mid and distal jejunal loops. Ileal loops are collapsed. Ileocecal junction appears unremarkable. Scattered diverticulosis of the transverse colon without evidence of diverticulitis. 3. Post cholecystectomy status. Moderate hepatomegaly with diffuse fatty infiltration of liver. Mild splenomegaly. 4. Pancreas and appendix appear unremarkable except for mild fatty infiltration of pancreas. No evidence of renal or ureteric calculus. Small 18 mm posterior cortical cyst in the left kidney lower pole. No follow-up is indicated. Recommendation: Follow up as clinically indicated. All CT scans at this facility utilize dose modulation, iterative reconstruction, and/or weight based dosing when appropriate to reduce radiation dose to as low as reasonably achievable.  Electronically Signed by Rolando Valadez MD at 05-Sep-2022 06:23:29 AM             CTA ABDOMEN PELVIS W WO CONTRAST    Result Date: 9/6/2022  NO PRIOR REPORT AVAILABLE Exam: CTA OF ABDOMEN AND PELVIS Clinical data:Severe abdominal pain, thickening of the jejunal Recommendation: Follow up as clinically indicated. All CT scans at this facility utilize dose modulation, iterative reconstruction, and/or weight based dosing when appropriate to reduce radiation dose to as low as reasonably achievable. Electronically Signed by Sayra Blackwell MD at 06-Sep-2022 05:35:12 PM               Pertinent Labs:   CBC:   Recent Labs     09/06/22  0242 09/07/22  0313 09/08/22  0330   WBC 9.0 8.4 8.8   HGB 13.6* 13.6* 14.0    198 190     BMP:    Recent Labs     09/06/22  0242 09/07/22  0313 09/08/22  0330    137 139   K 4.0 4.0 4.2   CL 98 98 101   CO2 28 29 29   BUN 15 14 14   CREATININE 0.8 0.8 1.1   GLUCOSE 172* 129* 120*     INR: No results for input(s): INR in the last 72 hours. Physical Exam:  Vital Signs: /82   Pulse 62   Temp 97.9 °F (36.6 °C) (Temporal)   Resp 18   Ht 6' 4\" (1.93 m)   Wt 288 lb (130.6 kg)   SpO2 96%   BMI 35.06 kg/m²   Physical Exam  Vitals and nursing note reviewed. Constitutional:       General: He is not in acute distress. Appearance: Normal appearance. He is obese. He is not ill-appearing. HENT:      Head: Normocephalic and atraumatic. Right Ear: External ear normal.      Left Ear: External ear normal.      Nose: Nose normal.      Mouth/Throat:      Mouth: Mucous membranes are moist.   Eyes:      Extraocular Movements: Extraocular movements intact. Conjunctiva/sclera: Conjunctivae normal.      Pupils: Pupils are equal, round, and reactive to light. Cardiovascular:      Rate and Rhythm: Normal rate and regular rhythm. Pulses: Normal pulses. Heart sounds: Normal heart sounds. Pulmonary:      Effort: Pulmonary effort is normal. No respiratory distress. Breath sounds: Normal breath sounds. No wheezing, rhonchi or rales. Abdominal:      General: Bowel sounds are normal. There is no distension. Palpations: Abdomen is soft. Tenderness: There is no abdominal tenderness.    Musculoskeletal: General: No swelling, tenderness or deformity. Normal range of motion. Cervical back: Normal range of motion and neck supple. No muscular tenderness. Right lower leg: No edema. Left lower leg: No edema. Skin:     General: Skin is warm and dry. Findings: No bruising or lesion. Neurological:      Mental Status: He is alert and oriented to person, place, and time. Psychiatric:         Mood and Affect: Mood normal.         Behavior: Behavior normal.         Thought Content: Thought content normal.         Discharge Medications:         Medication List        START taking these medications      ciprofloxacin 500 MG tablet  Commonly known as: CIPRO  Take 1 tablet by mouth 2 times daily for 7 days     dicyclomine 10 MG capsule  Commonly known as: Bentyl  Take 2 capsules by mouth 4 times daily     docusate sodium 100 MG capsule  Commonly known as: COLACE  Take 1 capsule by mouth 2 times daily     insulin glargine 100 UNIT/ML injection vial  Commonly known as: LANTUS  Inject 17 Units into the skin nightly     metroNIDAZOLE 500 MG tablet  Commonly known as: FLAGYL  Take 1 tablet by mouth 3 times daily for 7 days     oxyCODONE HCl 10 MG immediate release tablet  Commonly known as: OXY-IR  Take 1 tablet by mouth every 6 hours as needed for Pain for up to 3 days. CONTINUE taking these medications      clotrimazole-betamethasone 1-0.05 % cream  Commonly known as: Lotrisone  Apply topically 2 times daily.      glyBURIDE 5 MG tablet  Commonly known as: DIABETA  TAKE 1 TABLET BY MOUTH DAILY (WITH BREAKFAST)     levothyroxine 100 MCG tablet  Commonly known as: SYNTHROID  TAKE 1 TABLET BY MOUTH DAILY     metFORMIN 500 MG tablet  Commonly known as: GLUCOPHAGE  Take 1 tablet by mouth 2 times daily (with meals)     ondansetron 4 MG disintegrating tablet  Commonly known as: ZOFRAN-ODT            STOP taking these medications      albuterol sulfate  (90 Base) MCG/ACT inhaler  Commonly known as: Ventolin HFA     fluticasone 50 MCG/ACT nasal spray  Commonly known as: FLONASE     hydroCHLOROthiazide 25 MG tablet  Commonly known as: HYDRODIURIL     losartan 25 MG tablet  Commonly known as: COZAAR     metoclopramide 10 MG tablet  Commonly known as: REGLAN     ondansetron 4 MG tablet  Commonly known as: ZOFRAN               Where to Get Your Medications        These medications were sent to [address] [place] [address], [id number]      Phone: [phone]   ciprofloxacin 500 MG tablet  dicyclomine 10 MG capsule  docusate sodium 100 MG capsule  insulin glargine 100 UNIT/ML injection vial  metroNIDAZOLE 500 MG tablet  oxyCODONE HCl 10 MG immediate release tablet         Discharge Instructions: Follow up with DENIS Gilbert within 1 week for hospital follow-up  Schedule follow-up appointment as soon as possible for [facility] gastroenterology. Follow-up with Dr. Quinteros Poster and after follow-up CT for hospital follow-up  Take medications as directed. Resume activity as tolerated. Diet: ADULT DIET; Regular     Disposition: Patient is Stableand will be discharged to Home. Time spent on discharge 36 minutes spent in assessing patient, reviewing medications, discussion with nursing, confirming safe discharge plan and preparation of discharge summary.     Signed:  DENIS Moreno, 9/8/2022 11:22 AM

## 2022-09-08 NOTE — TELEPHONE ENCOUNTER
[facility] schedule hospital follow-up and schedule CT with Gastro. However, pt hasn't been seen in office or by provider in office, pt has seen Dr. Deric Estevez. There also was no time frame listed. Please contact pt to discuss/schedule.     Thank you

## 2022-09-08 NOTE — PROGRESS NOTES
GI  - PROGRESS NOTE    Subjective:   Admit Date: 9/5/2022  PCP: DENIS Cloud    Patient being seen for: 9/6/2022: GI consult: Enteritis on CT scan. Short segment wall thickening 11 mm involving 6.5 cm distal jejunum with mild adjacent fat stranding in the right lumbar region. Few enlarged mesenteric lymph nodes, largest measuring 11 mm. Etiology not clear. Moderate dilation of the mid and distal jejunal loops. Ileal loops collapsed. Ileocecal junction unremarkable. Diverticulosis of transverse colon. Post cholecystectomy state. Moderate hepatomegaly with diffuse fatty infiltration. Mild splenomegaly. Abdominal pain, nausea. Pain worse with movement and radiated to the back. Because of jejunal wall thickening, fat stranding, enlarged mesenteric lymph nodes and dilation of mid and distal jejunal loops not clear. Cause was felt to be infectious, due to possible food binding and possible small bowel ischemia. Plan was CT of the abdomen and surgical consultation and stool PCR because patient has history of diarrhea. 24 hr events/Interval History:     Awaiting hematology oncology consultation for lymphadenopathy. Patient complains of mid abdominal pain. After eating he had  diarrhea twice. Denies pain after eating. If abdominal pain gets worse on coughing or sneezing. He always had diarrhea since his cholecystectomy. Never had EGD or colonoscopy. Never has seen a gastroenterologist for chronic diarrhea. Diarrhea is felt to be post cholecystectomy diarrhea. Appreciate surgical consultation. Reviewed surgery note. Findings consistent with enteritis. No obstruction or indication for surgical intervention. Some enlarged mesenteric lymph nodes. Would recommend follow-up with the PCP to follow-up on imaging in about 3 months to see if there is resolution of lymphadenopathy which may be reactive due to enteritis.   Okay for diet from general surgery. Would recommend bowel regimen. 9/7/2022: Electrolytes and GFR normal.    9/5/2022: LFTs normal.    9/7/2022: WBC 8.4, hemoglobin 13.6 and stable. MCV 86. Platelet count 648,193.    9/7/2022: Stool PCR negative. 8/3/2021. GUS negative. Rheumatoid factor less than 10.    9/6/2022: CTA of the abdomen with and without contrast: No jejunal loop dilation. Fat stranding regressed. Retroperitoneal and mesenteric lymphadenopathy no changes. Colonic diverticulosis. Splenomegaly and fatty hepatomegaly. Cholecystectomy. Recommendation was follow-up as clinically indicated.      Medications:    Scheduled Meds:   polyethylene glycol  17 g Oral Daily    docusate sodium  100 mg Oral BID    insulin glargine  17 Units SubCUTAneous Nightly    levothyroxine  100 mcg Oral Daily    insulin lispro  0-8 Units SubCUTAneous TID WC    insulin lispro  0-4 Units SubCUTAneous Nightly    sodium chloride flush  5-40 mL IntraVENous 2 times per day    enoxaparin  30 mg SubCUTAneous BID    HYDROmorphone  0.5 mg IntraVENous Once    piperacillin-tazobactam  3,375 mg IntraVENous Q8H       Continuous Infusions:   dextrose      sodium chloride 25 mL (09/08/22 0225)    lactated ringers Stopped (09/07/22 1915)       PRN Meds:.glucose, dextrose bolus **OR** dextrose bolus, glucagon (rDNA), dextrose, HYDROmorphone **OR** HYDROmorphone, oxyCODONE **OR** oxyCODONE, naloxone, sodium chloride flush, sodium chloride, ondansetron **OR** ondansetron, acetaminophen **OR** acetaminophen, potassium chloride **OR** potassium alternative oral replacement **OR** potassium chloride, magnesium sulfate, polyethylene glycol, melatonin, calcium carbonate      Labs:     Recent Labs     09/06/22  0242 09/07/22  0313 09/08/22  0330   WBC 9.0 8.4 8.8   RBC 4.96 5.05 5.10   HGB 13.6* 13.6* 14.0   HCT 43.7 43.8 44.0   MCV 88.1 86.7 86.3   MCH 27.4 26.9* 27.5   MCHC 31.1* 31.1* 31.8*    198 190         Recent Labs     09/06/22  0240 09/07/22  0313 09/08/22  0330    137 139   K 4.0 4.0 4.2   ANIONGAP 11 10 9   CL 98 98 101   CO2 28 29 29   BUN 15 14 14   CREATININE 0.8 0.8 1.1   GLUCOSE 172* 129* 120*   CALCIUM 8.5* 8.6 8.6         No results for input(s): MG, PHOS in the last 72 hours. No results for input(s): AST, ALT, ALB, BILITOT, ALKPHOS, ALB in the last 72 hours. HgBA1c:  No components found for: HGBA1C    FLP:    Lab Results   Component Value Date/Time    TRIG 107 10/09/2020 08:46 AM    HDL 41 10/09/2020 08:46 AM    LDLCALC 114 10/09/2020 08:46 AM    LDLDIRECT 128 07/06/2017 08:41 AM       TSH:    Lab Results   Component Value Date/Time    TSH 4.480 03/16/2021 11:26 AM       Troponin T: No results for input(s): TROPONINI in the last 72 hours. INR: No results for input(s): INR in the last 72 hours. No results for input(s): LIPASE in the last 72 hours. -----------------------------------------------------------------      Physical Exam:     Vitals:    09/07/22 1752 09/07/22 2048 09/08/22 0020 09/08/22 0558   BP: 134/87  128/80 120/82   Pulse: 61  59 62   Resp: 16 18 18 18   Temp: 97.7 °F (36.5 °C)  98.1 °F (36.7 °C) 97.9 °F (36.6 °C)   TempSrc: Oral  Oral Temporal   SpO2: 96%  95% 96%   Weight:       Height:           24HR INTAKE/OUTPUT:    Intake/Output Summary (Last 24 hours) at 9/8/2022 0935  Last data filed at 9/8/2022 9430  Gross per 24 hour   Intake 1924.25 ml   Output --   Net 1924.25 ml         General appearance: Alert and oriented. In no acute physical distress. Appears stated age. Head: normal cephalic. No jaundice. Neck: Supple. Abdomen: Obese. Midline well-healed vertical scar from prior surgery. No objective tenderness. Patient does have subjective pain/tenderness in the mid abdomen. Extremities: No leg edema. Skin: No jaundice. Neurologic: Alert and oriented. Impression:       Dilated jejunal loops not seen on CTA of 9/6/2022.   CT abdomen pelvis on 9/5/2022 had shown thickening of the jejunal loops. Stool PCR is negative for infection. Patient may have had viral gastroenteritis or may have had food poisoning. However we cannot prove viral gastroenteritis or food poisoning because of lack of corroborating evidence. Patient does not appear to have Crohn's disease of the small intestine. The lymphadenopathy seen could be due to recent viral gastroenteritis. Enlarged mesenteric lymph nodes. Patient will need repeat CAT scan in 1-3 months to prove resolution. It would be a good idea to consult hematology oncology to make sure patient does not have lymphoma. Diverticulosis coli. Plan:     CT enterography at some point in time to rule out Crohn's disease of small intestine. Hematology oncology consultation pending. Repeat CT scan of the abdomen at some point in time in future to follow-up on mesenteric lymphadenopathy.     Cait Mcgill MD    9/8/2022    9:35 AM        Disclaimer speech recognition software       (Please note that portions of this note were completed with a voice recognition program. Efforts were made to edit the dictations but occasionally words are mis-transcribed.)

## 2022-09-08 NOTE — CONSULTS
MEDICAL ONCOLOGY CONSULTATION    Pt Name: Amna Bo. MRN: [id number]  YOB: 1970  Date of evaluation: 9/8/2022    REASON FOR CONSULTATION: Mesenteric adenopathy  REQUESTING PHYSICIAN: GI    History Obtained From:    patient, electronic medical record    HISTORY OF PRESENT ILLNESS:  Lily Tolentino was first seen by me on 9/8/2022. I was consulted by the GI service during patient hospitalization at [facility].  The patient had findings of mesenteric adenopathy and therefore I was consulted. The patient is a 46years old male who has a history of type 2 diabetes and hypothyroidism. He presented to the ED department with acute complaints of severe abdominal pain. The patient had no nausea vomiting or diarrhea. He had no constipation. He denies any hematochezia or hematemesis. Work-up in the emergency showed elevated lactic acid. WBC count was mildly elevated. COVID was negative. 09/05/2022-CT abdomen/pelvis with IV contrast showed short segment inflammatory wall thickening 11 mm involving 6.5 cm segment of the distal jejunal loops with mild adjacent fat stranding in the right lumbar region. Few enlarged mesenteric lymph nodes are identified, largest measuring 11 mm. Moderate dilatation of the mid and distal jejunal loops. Ileal loops are collapsed. Ileocecal junction appears unremarkable. Scattered diverticulosis of the transverse colon without evidence of diverticulitis. Post cholecystectomy status. Moderate hepatomegaly with diffuse fatty infiltration of liver. Mild splenomegaly. Pancreas and appendix appear unremarkable except for mild fatty infiltration of pancreas. No evidence of renal or ureteric calculus. Small 18 mm posterior cortical cyst in the left kidney lower pole. No follow-up is indicated. 9/6/2022-CTA abdomen/pelvis with/without contrast showed no jejunal loops dilatation on the current study . Fat stranding is regressed since the prior study.  Retroperitoneal and mesenteric lymphadenopathy . No interval changes  Non complicated colonic diverticulosis . Splenomegaly and fatty hepatomegaly . Cholecystectomy         Past Medical History:    Past Medical History:   Diagnosis Date    Acid reflux     Adenomatous goiter     2013 left thyroidectomy  Dr Oziel Kothari    Arthralgia of multiple sites     Arthritis     osteo    Chronic back pain     COVID-19     Effusion of patella     Gastric ulcer     Knee pain     Medial meniscus tear     Mixed hyperlipidemia     Obesity     Pain of right hand     Postoperative hypothyroidism     left lobectomy, Dr Oziel Kothari     Prediabetes     Prolonged emergence from general anesthesia     fights upon emergence; works in [employer], woke feeling he was at work and being threatened. Simple goiter     Solitary thyroid nodule     Thyroid disease        Past Surgical History:    Past Surgical History:   Procedure Laterality Date    CHOLECYSTECTOMY, LAPAROSCOPIC      HERNIA REPAIR      umbilical    INGUINAL HERNIA REPAIR Left     age 9    KNEE ARTHROSCOPY Right     x2    KNEE CARTILAGE SURGERY Left 5/12/2016    ARTHROSCOPY PARTIAL MEDIAL MENISCECTOMY KNEE performed by Leax Wangs, DO at [facility] 59      wisdom teeth removed    THYROIDECTOMY, PARTIAL      Left thyroidectomy, Dr. Oziel Kothari, 2013    [facility]         Social History:    The patient currently lives at home with spouse  Tobacco:   reports that he has never smoked. He has quit using smokeless tobacco.  His smokeless tobacco use included chew. Alcohol:   reports no history of alcohol use.   Illicit Drugs: Never    Family History:   Family History   Problem Relation Age of Onset    Other Mother         hypothyroidism    Heart Disease Mother     Diabetes Mother     Heart Attack Father     Breast Cancer Maternal Aunt     Cancer Paternal Aunt        Current Hospital Medications:    Current Facility-Administered Medications 09/08/22 0225    enoxaparin Sodium (LOVENOX) injection 30 mg  30 mg SubCUTAneous BID Dalton Mack MD   30 mg at 09/05/22 2106    ondansetron (ZOFRAN-ODT) disintegrating tablet 4 mg  4 mg Oral Q8H PRN Dalton Mack MD        Or    ondansetron [facility]) injection 4 mg  4 mg IntraVENous Q6H PRN Dalton Mack MD   4 mg at 09/05/22 1838    acetaminophen (TYLENOL) tablet 650 mg  650 mg Oral Q6H PRN Dalton Mack MD   650 mg at 09/07/22 2018    Or    acetaminophen (TYLENOL) suppository 650 mg  650 mg Rectal Q6H PRN Dalton Mack MD        potassium chloride (KLOR-CON M) extended release tablet 40 mEq  40 mEq Oral PRN Dalton Mack MD        Or    potassium bicarb-citric acid (EFFER-K) effervescent tablet 40 mEq  40 mEq Oral PRN Dalton Mack MD        Or    potassium chloride 10 mEq/100 mL IVPB (Peripheral Line)  10 mEq IntraVENous PRN Dalton Mack MD        magnesium sulfate 2000 mg in 50 mL IVPB premix  2,000 mg IntraVENous PRN Dalton Mack MD        polyethylene glycol (GLYCOLAX) packet 17 g  17 g Oral Daily PRN Dalton Mack MD        melatonin disintegrating tablet 5 mg  5 mg Oral Nightly PRN Dalton Mack MD   5 mg at 09/07/22 2018    calcium carbonate (TUMS) chewable tablet 500 mg  500 mg Oral TID PRN Dalton Mack MD        HYDROmorphone HCl PF (DILAUDID) injection 0.5 mg  0.5 mg IntraVENous Once Levon Dong MD        lactated ringers infusion   IntraVENous Continuous Dalton Mack MD   Stopped at 09/07/22 1915    piperacillin-tazobactam (ZOSYN) 3,375 mg in dextrose 5 % 50 mL IVPB ([id number])  3,375 mg IntraVENous Q8H Dalton Mack MD 12.5 mL/hr at 09/08/22 0226 3,375 mg at 09/08/22 0226       Allergies: No Known Allergies      Subjective   REVIEW OF SYSTEMS:   Constitutional:  Positive for appetite change and fatigue. Negative for chills, diaphoresis and fever. HENT:  Negative for congestion and ear pain. Eyes:  Negative for visual disturbance.    Respiratory:  Negative for cough, shortness of breath and wheezing. Cardiovascular:  Negative for chest pain, palpitations and leg swelling. Gastrointestinal:  Positive for abdominal distention, abdominal pain and vomiting. Negative for blood in stool, constipation, diarrhea and nausea. Endocrine: Negative for cold intolerance and heat intolerance. Genitourinary:  Negative for difficulty urinating, flank pain, frequency and urgency. Musculoskeletal:  Negative for arthralgias and myalgias. Skin:  Negative for color change and wound. Neurological:  Negative for dizziness, syncope, weakness, light-headedness, numbness and headaches. Hematological:  Does not bruise/bleed easily. Objective   /82   Pulse 62   Temp 97.9 °F (36.6 °C) (Temporal)   Resp 18   Ht 6' 4\" (1.93 m)   Wt 288 lb (130.6 kg)   SpO2 96%   BMI 35.06 kg/m²     PHYSICAL EXAM:  CONSTITUTIONAL: Alert, appropriate, no acute distress  EYES: Non icteric, EOM intact, pupils equal round   ENT: Mucus membranes moist,external inspection of ears and nose are normal  NECK: Supple, no masses. No palpable thyroid mass  CHEST/LUNGS: CTA bilaterally, normal respiratory effort   CARDIOVASCULAR: RRR, no murmurs. No lower extremity edema  ABDOMEN: soft non-tender, active bowel sounds, no HSM. No palpable masses  EXTREMITIES: warm, full ROM in all 4 extremities, no focal weakness. SKIN: warm, dry with no rashes or lesions  LYMPH: No cervical, clavicular, axillary, or inguinal lymphadenopathy  NEUROLOGIC: follows commands, non focal   PSYCH: mood and affect appropriate.  Alert and oriented to time, place, person        LABORATORY RESULTS REVIEWED/ANALYZED BY ME:  Recent Labs     09/08/22  0330 09/07/22  0313 09/06/22  0242   WBC 8.8 8.4 9.0   HGB 14.0 13.6* 13.6*   HCT 44.0 43.8 43.7   MCV 86.3 86.7 88.1    198 189       Lab Results   Component Value Date     09/08/2022    K 4.2 09/08/2022     09/08/2022    CO2 29 09/08/2022    BUN 14 09/08/2022 CREATININE 1.1 09/08/2022    GLUCOSE 120 (H) 09/08/2022    CALCIUM 8.6 09/08/2022    PROT 7.5 09/05/2022    LABALBU 4.4 09/05/2022    BILITOT <0.2 09/05/2022    ALKPHOS 92 09/05/2022    AST 16 09/05/2022    ALT 24 09/05/2022    LABGLOM >60 09/08/2022    GFRAA >59 09/08/2022       No results found for: INR, PROTIME    RADIOLOGY STUDIES REPORT/REVIEWED AND INTERPRETED BY ME:  CT ABDOMEN PELVIS W IV CONTRAST Additional Contrast? None    Result Date: 9/5/2022  1. Short segment inflammatory wall thickening 11 mm involving 6.5 cm segment of the distal jejunal loops with mild adjacent fat stranding in the right lumbar region. Few enlarged mesenteric lymph nodes are identified, largest measuring 11 mm. 2. Moderate dilatation of the mid and distal jejunal loops. Ileal loops are collapsed. Ileocecal junction appears unremarkable. Scattered diverticulosis of the transverse colon without evidence of diverticulitis. 3. Post cholecystectomy status. Moderate hepatomegaly with diffuse fatty infiltration of liver. Mild splenomegaly. 4. Pancreas and appendix appear unremarkable except for mild fatty infiltration of pancreas. No evidence of renal or ureteric calculus. Small 18 mm posterior cortical cyst in the left kidney lower pole. No follow-up is indicated. Recommendation: Follow up as clinically indicated. All CT scans at this facility utilize dose modulation, iterative reconstruction, and/or weight based dosing when appropriate to reduce radiation dose to as low as reasonably achievable. Electronically Signed by Lisbet Buckley MD at 05-Sep-2022 06:23:29 AM             CTA ABDOMEN PELVIS W WO CONTRAST    Result Date: 9/6/2022  No jejunal loops dilatation on the current study . Fat stranding is regressed since the prior study. Retroperitoneal and mesenteric lymphadenopathy . No interval changes  Non complicated colonic diverticulosis . Splenomegaly and fatty hepatomegaly . Cholecystectomy .  Recommendation: Follow up as clinically indicated. All CT scans at this facility utilize dose modulation, iterative reconstruction, and/or weight based dosing when appropriate to reduce radiation dose to as low as reasonably achievable. Electronically Signed by Abbi Mason MD at 06-Sep-2022 05:35:12 PM              ASSESSMENT:  #Retroperitoneal adenopathy  The lymph nodes at the current size are not amenable to a biopsy at this time. This could be reactive lymph nodes to the short segment bowel inflammation  -The best course of action in this case would be to repeat the CT scans in a few weeks after resolution of the acute process. If worsening of adenopathy then could pursue biopsy. #Hepatosplenomegaly-unknown etiology. Differential diagnosis include fatty liver disease, although this would not explain splenomegaly  -The first CT abdomen/pelvis report at this point as unremarkable. The second CT showed splenomegaly measuring 15.2 cm.  -Findings of fatty liver. However I cannot rule out an underlying lymphoproliferative process. PLAN:  Continue current supportive care  We will arrange for follow-up CT abdomen/pelvis with contrast in 4 weeks and see him after. I have seen, examined and reviewed this patient medication list, appropriate labs and imaging studies. I reviewed relevant medical records and others physicians notes. I discussed the plans of care with the patient. I answered all the questions to the patients satisfaction. I have also reviewed the chief complaint (CC) and part of the history (History of Present Illness (HPI), Past Family Social History [facility]), or Review of Systems (ROS) and made changes when appropriated.        (Please note that portions of this note were completed with a voice recognition program. Efforts were made to edit the dictations but occasionally words are mis-transcribed.)        Janelle Izquierdo MD    09/08/22  6:12 AM

## 2022-09-09 ENCOUNTER — TELEPHONE (OUTPATIENT)
Dept: FAMILY MEDICINE CLINIC | Age: 52
End: 2022-09-09

## 2022-09-09 ENCOUNTER — CARE COORDINATION (OUTPATIENT)
Dept: CASE MANAGEMENT | Age: 52
End: 2022-09-09

## 2022-09-09 DIAGNOSIS — R10.84 GENERALIZED ABDOMINAL PAIN: Primary | ICD-10-CM

## 2022-09-09 NOTE — CARE COORDINATION
Ella 45 Transitions Initial Follow Up Call    Call within 2 business days of discharge: Yes    Patient: Nancy Stover. Patient : 1970   MRN: [id number]  Reason for Admission: Septic Enteritis  Discharge Date: 22 RARS: Readmission Risk Score: 4.5      Last Discharge [facility]       Date Complaint Diagnosis Description Type Department Provider    22 Abdominal Pain Generalized abdominal pain . .. ED to Hosp-Admission (Discharged) (ADMITTED) L MED SURG Giovanni Tai MD; Yifan Iyer. .. Spoke with: Nancy Stover. Facility: [address] [place] [address] [employer]    Non-face-to-face services provided:  Reviewed encounter information for continuity of care prior to follow up Care Transitions Coordination phone call - chart notes, consults, progress notes, test results, med list, appointments, AVS, other information. Care Transitions 24 Hour Call    Schedule Follow Up Appointment with PCP: Completed  Do you have a copy of your discharge instructions?: Yes  Do you have all of your prescriptions and are they filled?: Yes  Have you been contacted by a [address]?: No  Have you scheduled your follow up appointment?: Yes  How are you going to get to your appointment?: Car - drive self  Do you feel like you have everything you need to keep you well at home?: Yes  Care Transitions Interventions       Placed a call to the number listed for patient for an initial follow up call for Care Transitions Coordination following discharge from the hospital.  Introduced myself and explained the purpose of my call as well as verifying name, date of birth of patient. Spoke with patient regarding hospitalization, discharge and status thus far. He reported that he is some better today, but is still having some pain. He said it is not as sharp as it was, a little dull. He said he is still having some nausea, but no vomiting. He said he is eating, but not well.   Appetite is not good at all, but he knows he needs to eat to get any strength back. He said he was told he could eat pretty much anything he can tolerate, but he is trying to stay on a soft diet at this time. He said he is drinking fluids. He said he is weak and is just lying around, taking it easy. He said he has not checked his blood sugar today. He is aware of his PCP follow up appointment. Юлия Noguera his wife got a call a little bit ago about a follow up with oncology, but they have not heard from GI. He has all of his meds and is taking them as ordered. No other issues noted. Informed/reminded of Care Transitions Coordination process, purpose, future calls, etc and is agreeable with appropriate follow up. Ensured to have CTN contact information to be used as needed. Encouraged to call for new/ongoing issues, questions, concerns, etc.  Encouraged to make contact with PCP as indicated for any further needs as well. Given the opportunity to ask questions and answered appropriately. CTN to follow up as indicated. Transitions of Care Initial Call    Was this an external facility discharge? No     Challenges to be reviewed by the provider   Additional needs identified to be addressed with provider: No  none       Method of communication with provider : none    Advance Care Planning:   Does patient have an Advance Directive: not on file; education provided. Care Transition Nurse contacted the patient by telephone to perform post hospital discharge assessment. Verified name and  with patient as identifiers. Provided introduction to self, and explanation of the CTN role. CTN reviewed discharge instructions, medical action plan and red flags with patient who verbalized understanding. Patient given an opportunity to ask questions and does not have any further questions or concerns at this time. Were discharge instructions available to patient? Yes.  Reviewed appropriate site of care based on symptoms and resources available to patient including: PCP  Specialist  Urgent care clinics  [facility]  When to call [address] The patient agrees to contact the PCP office for questions related to their healthcare. Medication reconciliation was performed with patient, who verbalizes understanding of administration of home medications. Advised obtaining a 90-day supply of all daily and as-needed medications. CTN provided contact information. Plan for follow-up call in 5-7 days based on severity of symptoms and risk factors.   Plan for next call: Plan for next call - assess changes since last call, assess overall status, pain, appetite/nutritional status, sleep patterns, abnormal signs and symptoms, availability and effectiveness of medications, activity level and tolerance, falls/other unexpected events, findings from follow up appointments, medication/treatment changes, any additional teaching needs, ie education regarding self care mgmt in the home - disease process mgmt, symptom mgmt, diet/hydration, pain control needs, medication mgmt, activity level, fall precautions & home safety needs, infection control, seeking medical attention, who/when to call prn any needs, etc.      Follow Up  Future Appointments   Date Time Provider Khang Nielsen   9/15/2022 10:00 AM Ellie Gutierrez, [id number] [address] [facility]   10/10/2022  8:40 AM [facility] CT RM 1 [facility] CT [facility] Rad   10/12/2022  9:30 AM Ovi Aviles MD N PAD [facility]ON[facility]   10/13/2022  8:50 AM DENIS García N LPS Gastro [facility]       Sarahy Gomez, TERESO

## 2022-09-09 NOTE — TELEPHONE ENCOUNTER
Ella 45 Transitions Initial Follow Up Call    Outreach made within 2 business days of discharge: Yes    Patient: Charol Dakins. Patient : 1970   MRN: [id number]  Reason for Admission: Generalized abdominal pain; Enteritis; Septic Enteritis   Discharge Date: 22       Spoke with: Sandra Pennington    Discharge department/facility: [facility]    [facility] Interactive Patient Contact:  Was patient able to fill all prescriptions: Yes  Was patient instructed to bring all medications to the follow-up visit: Yes  Is patient taking all medications as directed in the discharge summary?  Yes  Does patient understand their discharge instructions: Yes  Does patient have questions or concerns that need addressed prior to 7-14 day follow up office visit: no    Scheduled appointment with PCP within 7-14 days    Follow Up  Future Appointments   Date Time Provider Khang Nielsen   9/15/2022 10:00 AM Caterina Orellana [address] [place] [address]   10/10/2022  8:40 AM [facility] CT RM 1 [facility] CT [facility] Rad   10/12/2022  9:30 AM [facility]       Lise Connor MA

## 2022-09-10 LAB
BLOOD CULTURE, ROUTINE: NORMAL
CULTURE, BLOOD 2: NORMAL

## 2022-09-15 ENCOUNTER — OFFICE VISIT (OUTPATIENT)
Dept: FAMILY MEDICINE CLINIC | Age: 52
End: 2022-09-15
Payer: COMMERCIAL

## 2022-09-15 VITALS
HEIGHT: 76 IN | HEART RATE: 82 BPM | SYSTOLIC BLOOD PRESSURE: 110 MMHG | TEMPERATURE: 98 F | OXYGEN SATURATION: 96 % | BODY MASS INDEX: 35.31 KG/M2 | RESPIRATION RATE: 18 BRPM | WEIGHT: 290 LBS | DIASTOLIC BLOOD PRESSURE: 68 MMHG

## 2022-09-15 DIAGNOSIS — R59.0 MESENTERIC LYMPHADENOPATHY: ICD-10-CM

## 2022-09-15 DIAGNOSIS — E11.9 TYPE 2 DIABETES MELLITUS WITHOUT COMPLICATION, WITHOUT LONG-TERM CURRENT USE OF INSULIN (HCC): ICD-10-CM

## 2022-09-15 DIAGNOSIS — A09: Primary | ICD-10-CM

## 2022-09-15 PROCEDURE — 99496 TRANSJ CARE MGMT HIGH F2F 7D: CPT | Performed by: CLINICAL NURSE SPECIALIST

## 2022-09-15 RX ORDER — GLYBURIDE 5 MG/1
5 TABLET ORAL
Qty: 30 TABLET | Refills: 2 | Status: SHIPPED | OUTPATIENT
Start: 2022-09-15

## 2022-09-15 ASSESSMENT — PATIENT HEALTH QUESTIONNAIRE - PHQ9
SUM OF ALL RESPONSES TO PHQ QUESTIONS 1-9: 1
SUM OF ALL RESPONSES TO PHQ9 QUESTIONS 1 & 2: 1
2. FEELING DOWN, DEPRESSED OR HOPELESS: 0
SUM OF ALL RESPONSES TO PHQ QUESTIONS 1-9: 1
1. LITTLE INTEREST OR PLEASURE IN DOING THINGS: 1

## 2022-09-15 NOTE — PROGRESS NOTES
SUBJECTIVE:  Jolene Mansfield is a 46 y.o. who presents today for Follow-Up from Hospital (Still sore, but improving)      HPI    Mr Bhavani Akins presents today for hospital follow up, high complexity TCM visit. Admitted 9/5/22 with intractable abdominal pain diagnosed with septic enteritis. Followed by GI and general during admission. Improved with anbx, discharged 9/8/22 on listed oral anbx. Abdominal pain is better. He is tolerating bland diet without any worsening abdominal pain. BM unchanged. No n/v  No fever. Has follow up with GI with CT. Has follow up with hematology for mesenteric lymphadenopathy. TCM call made within 2 business days of DC. Office visit within 7 days of DC  Medications reviewed. High complexity due to admission diagnosis, comorbid conditions, high risk for readmission and complication. Type 2 DM, improved. Tolerating metformin and glyburide without side effects or hypos. Has worked on weight loss and diet management. A1c trending down. Did not continue lantus at discharge. Past Medical History:   Diagnosis Date    Acid reflux     Adenomatous goiter     2013 left thyroidectomy  Dr Dinora Chi    Arthralgia of multiple sites     Arthritis     osteo    Chronic back pain     COVID-19     Effusion of patella     Gastric ulcer     Knee pain     Medial meniscus tear     Mixed hyperlipidemia     Obesity     Pain of right hand     Postoperative hypothyroidism     left lobectomy, Dr Dinora Chi     Prediabetes     Prolonged emergence from general anesthesia     fights upon emergence; works in [employer], woke feeling he was at work and being threatened.     Simple goiter     Solitary thyroid nodule     Thyroid disease      Past Surgical History:   Procedure Laterality Date    CHOLECYSTECTOMY, LAPAROSCOPIC      HERNIA REPAIR      umbilical    INGUINAL HERNIA REPAIR Left     age 9    KNEE ARTHROSCOPY Right     x2    KNEE CARTILAGE SURGERY Left 5/12/2016    ARTHROSCOPY PARTIAL MEDIAL MENISCECTOMY KNEE performed by Sherice Hastings DO at [address]      wisdom teeth removed    THYROIDECTOMY, PARTIAL      Left thyroidectomy, Dr. Anna Scott, 2013    UPPER GASTROINTESTINAL ENDOSCOPY      VENTRAL HERNIA REPAIR       Family History   Problem Relation Age of Onset    Other Mother         hypothyroidism    Heart Disease Mother     Diabetes Mother     Heart Attack Father     Breast Cancer Maternal Aunt     Cancer Paternal Aunt      Social History     Tobacco Use    Smoking status: Never    Smokeless tobacco: Former     Types: Chew   Substance Use Topics    Alcohol use: No     Current Outpatient Medications   Medication Sig Dispense Refill    blood glucose monitor kit and supplies Glucometer of choice per insurance coverage. Test once daily. Test strips and lancets for 90 days 1 kit 0    glyBURIDE (DIABETA) 5 MG tablet Take 1 tablet by mouth daily (with breakfast) 30 tablet 2    dicyclomine (BENTYL) 10 MG capsule Take 2 capsules by mouth 4 times daily 120 capsule 0    docusate sodium (COLACE) 100 MG capsule Take 1 capsule by mouth 2 times daily 60 capsule 0    metFORMIN (GLUCOPHAGE) 500 MG tablet Take 1 tablet by mouth 2 times daily (with meals) 60 tablet 5    levothyroxine (SYNTHROID) 100 MCG tablet TAKE 1 TABLET BY MOUTH DAILY 30 tablet 5    ondansetron (ZOFRAN-ODT) 4 MG disintegrating tablet DISSOLVE 1 TABLET UNDER THE TONGUE EVERY 4 HOURS AS NEEDED FOR NAUSEA OR VOMITING      clotrimazole-betamethasone (LOTRISONE) 1-0.05 % cream Apply topically 2 times daily. 45 g 0     No current facility-administered medications for this visit. No Known Allergies    Review of Systems   Constitutional:  Positive for appetite change. Negative for chills, diaphoresis, fatigue, fever and unexpected weight change. HENT:  Negative for congestion, ear pain, hearing loss, postnasal drip, sinus pressure, sore throat and trouble swallowing. Eyes:  Negative for pain, discharge and visual disturbance. Respiratory:  Negative for cough, chest tightness, shortness of breath and wheezing. Cardiovascular:  Negative for chest pain and palpitations. Gastrointestinal:  Positive for abdominal pain. Negative for abdominal distention, constipation, diarrhea, nausea and vomiting. Genitourinary:  Negative for difficulty urinating, dysuria, flank pain, frequency, hematuria and urgency. Musculoskeletal:  Negative for arthralgias, back pain, joint swelling and neck pain. Skin:  Negative for color change and rash. Neurological:  Negative for dizziness, syncope, weakness, light-headedness and headaches. Hematological:  Negative for adenopathy. Psychiatric/Behavioral:  Negative for behavioral problems, confusion and dysphoric mood. The patient is not nervous/anxious. OBJECTIVE:  /68   Pulse 82   Temp 98 °F (36.7 °C) (Infrared)   Resp 18   Ht 6' 4\" (1.93 m)   Wt 290 lb (131.5 kg)   SpO2 96%   BMI 35.30 kg/m²    Physical Exam  Vitals reviewed. Constitutional:       General: He is not in acute distress. Appearance: He is well-developed. He is not ill-appearing or toxic-appearing. HENT:      Head: Normocephalic and atraumatic. Eyes:      General:         Right eye: No discharge. Left eye: No discharge. Conjunctiva/sclera: Conjunctivae normal.      Pupils: Pupils are equal, round, and reactive to light. Neck:      Thyroid: No thyromegaly. Trachea: No tracheal deviation. Cardiovascular:      Rate and Rhythm: Normal rate and regular rhythm. Heart sounds: No murmur heard. Pulmonary:      Effort: Pulmonary effort is normal. No respiratory distress. Breath sounds: Normal breath sounds. No wheezing or rales. Abdominal:      General: Bowel sounds are normal. There is no distension. Palpations: Abdomen is soft. Tenderness: There is no abdominal tenderness. Genitourinary:     Penis: No tenderness. Musculoskeletal:         General: No deformity. Normal range of motion. Cervical back: Normal range of motion and neck supple. Skin:     General: Skin is warm and dry. Findings: No erythema or rash. Neurological:      General: No focal deficit present. Mental Status: He is alert and oriented to person, place, and time. Psychiatric:         Mood and Affect: Mood normal.         Behavior: Behavior normal.         Thought Content: Thought content normal.         Judgment: Judgment normal.       ASSESSMENT/PLAN:  1. Septic enteritis  Improved  Hospital records reviewed  Complete anbx  Keep follow up as scheduled    2. Mesenteric lymphadenopathy    3. Type 2 diabetes mellitus without complication, without long-term current use of insulin (HCC)  Improving. Same meds, diet management and weight loss  Recheck labs 2 months  - blood glucose monitor kit and supplies; Glucometer of choice per insurance coverage. Test once daily. Test strips and lancets for 90 days  Dispense: 1 kit; Refill: 0  - glyBURIDE (DIABETA) 5 MG tablet; Take 1 tablet by mouth daily (with breakfast)  Dispense: 30 tablet; Refill: 2        Return in about 2 months (around 11/15/2022).

## 2022-09-16 ENCOUNTER — CARE COORDINATION (OUTPATIENT)
Dept: CASE MANAGEMENT | Age: 52
End: 2022-09-16

## 2022-09-16 ASSESSMENT — ENCOUNTER SYMPTOMS
TROUBLE SWALLOWING: 0
EYE PAIN: 0
BACK PAIN: 0
ABDOMINAL PAIN: 1
SHORTNESS OF BREATH: 0
ABDOMINAL DISTENTION: 0
COLOR CHANGE: 0
SINUS PRESSURE: 0
SORE THROAT: 0
DIARRHEA: 0
NAUSEA: 0
WHEEZING: 0
CHEST TIGHTNESS: 0
VOMITING: 0
CONSTIPATION: 0
EYE DISCHARGE: 0
COUGH: 0

## 2022-09-16 NOTE — CARE COORDINATION
[facility] Transitions Follow Up Call    2022    Patient: Oriana Perez Patient : 1970   MRN: [id number]  Reason for Admission: septic enteritis  Discharge Date: 22 RARS: Readmission Risk Score: 4.5         Spoke with: Errol Tarango)    Conversation:  Spoke with Arden after 2 IDs. He stated he was doing alright and is back to work. There is still some abd pain but much better. Pt is able to eat and has regular Bms/ Reviewed upcoming appts. He is taking his bentyl. Has not taken his FBS today as he is at work. Educated to take metformin with food. Pt is taking his bentyl. Unable to review meds more thoroughly as pt is at work. Care Transitions Subsequent and Final Call    Subsequent and Final Calls  Care Transitions Interventions  Other Interventions: Follow Up  Future Appointments   Date Time Provider Khang Nielsen   10/10/2022  8:40 AM [facility] CT RM 1 [facility] CT [facility] Rad   10/12/2022  9:30 AM Katie Rodriguez MD N PAD [facility]   10/13/2022  8:50 AM DENIS Muhammad [facility]   2022 10:00 AM Calos Rey, [id number] Rohith Nogueira PC [facility]     Care Transitions Follow Up Call    Needs to be reviewed by the provider   Additional needs identified to be addressed with provider: No  none             Method of communication with provider : none      Care Transition Nurse contacted the patient by telephone to follow up after admission on 22. Verified name and  with patient as identifiers. Addressed changes since last contact: medications-need to take metformin with food and to take bentyl as prescribed  Discussed follow-up appointments. If no appointment was previously scheduled, appointment scheduling offered: Yes. Is follow up appointment scheduled within 7 days of discharge? Yes. Advance Care Planning:   Does patient have an Advance Directive: not on file.      CTN reviewed discharge instructions, medical action plan and red flags with patient and discussed any barriers to care and/or understanding of plan of care after discharge. Discussed appropriate site of care based on symptoms and resources available to patient including: PCP. The patient agrees to contact the PCP office for questions related to their healthcare. Patients top risk factors for readmission: medical condition-enteritis  Interventions to address risk factors: Education of patient/family/caregiver/guardian to support self-management-medications      Non-[facility] follow up appointment(s):     CTN provided contact information for future needs. Plan for follow-up call in 7-10 days based on severity of symptoms and risk factors.   Plan for next call: symptom management-  Assess overall status, abnormal signs and symptoms, availability and effectiveness of medications, activity level and tolerance, falls/other unexpected events, findings from follow up appointments, seeking medical attention, who/when to call prn any needs, etc.          Beuford Gaucher, RN

## 2022-11-17 ENCOUNTER — TELEMEDICINE (OUTPATIENT)
Dept: FAMILY MEDICINE CLINIC | Age: 52
End: 2022-11-17
Payer: COMMERCIAL

## 2022-11-17 DIAGNOSIS — R59.0 RETROPERITONEAL LYMPHADENOPATHY: ICD-10-CM

## 2022-11-17 DIAGNOSIS — R59.0 MESENTERIC LYMPHADENOPATHY: ICD-10-CM

## 2022-11-17 DIAGNOSIS — E11.9 TYPE 2 DIABETES MELLITUS WITHOUT COMPLICATION, WITHOUT LONG-TERM CURRENT USE OF INSULIN (HCC): Primary | ICD-10-CM

## 2022-11-17 DIAGNOSIS — E78.2 MIXED HYPERLIPIDEMIA: ICD-10-CM

## 2022-11-17 DIAGNOSIS — A09: ICD-10-CM

## 2022-11-17 DIAGNOSIS — E03.9 ACQUIRED HYPOTHYROIDISM: ICD-10-CM

## 2022-11-17 PROCEDURE — 99214 OFFICE O/P EST MOD 30 MIN: CPT | Performed by: CLINICAL NURSE SPECIALIST

## 2022-11-17 PROCEDURE — 3052F HG A1C>EQUAL 8.0%<EQUAL 9.0%: CPT | Performed by: CLINICAL NURSE SPECIALIST

## 2022-11-17 ASSESSMENT — ENCOUNTER SYMPTOMS
WHEEZING: 0
NAUSEA: 0
CONSTIPATION: 0
EYE PAIN: 0
DIARRHEA: 0
EYE DISCHARGE: 0
TROUBLE SWALLOWING: 0
VOMITING: 0
CHEST TIGHTNESS: 0
SHORTNESS OF BREATH: 0
SORE THROAT: 0
COUGH: 0
SINUS PRESSURE: 0
ABDOMINAL PAIN: 0
COLOR CHANGE: 0
BACK PAIN: 0

## 2022-11-17 NOTE — PROGRESS NOTES
SUBJECTIVE:  Yenny Chowdhury is a 46 y.o. who presents today for Diabetes      HPI    Ralph Murray was evaluated today via doxy. me for follow up. Type 2 Dm without complication, seems improved. He is tolerating metformin and glyburide. No symptoms of hypoglycemia. Fasting glucose this morning was 126. No polyuria or neuropathy. Hospital stay 2 months ago with septic enteritis, mesenteric/retroperitoneal lymphadenopathy. Improvement of symptoms. Completed treatment. Missed GI follow up due to another appointment. Needs follow up labs and CT imaging. Mixed hyperlipidemia, due for labs. Has made improvement to diet. Hypothyroidism, stable. No overt symptoms. Compliant with regimen. Past Medical History:   Diagnosis Date    Acid reflux     Adenomatous goiter     2013 left thyroidectomy  Dr Serena Wahl    Arthralgia of multiple sites     Arthritis     osteo    Chronic back pain     COVID-19     Effusion of patella     Gastric ulcer     Knee pain     Medial meniscus tear     Mixed hyperlipidemia     Obesity     Pain of right hand     Postoperative hypothyroidism     left lobectomy, Dr Serena Wahl     Prediabetes     Prolonged emergence from general anesthesia     fights upon emergence; works in [employer], woke feeling he was at work and being threatened.     Simple goiter     Solitary thyroid nodule     Thyroid disease      Past Surgical History:   Procedure Laterality Date    CHOLECYSTECTOMY, LAPAROSCOPIC      HERNIA REPAIR      umbilical    INGUINAL HERNIA REPAIR Left     age 9    KNEE ARTHROSCOPY Right     x2    KNEE CARTILAGE SURGERY Left 05/12/2016    ARTHROSCOPY PARTIAL MEDIAL MENISCECTOMY KNEE performed by Cesar Gooden DO at [address]      wisdom teeth removed    THYROIDECTOMY, PARTIAL      Left thyroidectomy, Dr. Serena Wahl, 2013    VENTRAL HERNIA REPAIR       Family History   Problem Relation Age of Onset    Other Mother         hypothyroidism    Heart Disease Mother     Diabetes Mother     Heart Attack Father     Breast Cancer Maternal Aunt     Cancer Paternal Aunt      Social History     Tobacco Use    Smoking status: Never    Smokeless tobacco: Former     Types: Chew   Substance Use Topics    Alcohol use: No     Current Outpatient Medications   Medication Sig Dispense Refill    blood glucose monitor kit and supplies Glucometer of choice per insurance coverage. Test once daily. Test strips and lancets for 90 days 1 kit 0    glyBURIDE (DIABETA) 5 MG tablet Take 1 tablet by mouth daily (with breakfast) 30 tablet 2    dicyclomine (BENTYL) 10 MG capsule Take 2 capsules by mouth 4 times daily 120 capsule 0    metFORMIN (GLUCOPHAGE) 500 MG tablet Take 1 tablet by mouth 2 times daily (with meals) 60 tablet 5    levothyroxine (SYNTHROID) 100 MCG tablet TAKE 1 TABLET BY MOUTH DAILY 30 tablet 5    ondansetron (ZOFRAN-ODT) 4 MG disintegrating tablet DISSOLVE 1 TABLET UNDER THE TONGUE EVERY 4 HOURS AS NEEDED FOR NAUSEA OR VOMITING      clotrimazole-betamethasone (LOTRISONE) 1-0.05 % cream Apply topically 2 times daily. 45 g 0     No current facility-administered medications for this visit. No Known Allergies    Review of Systems   Constitutional:  Negative for appetite change, chills, diaphoresis, fatigue and fever. HENT:  Negative for congestion, ear pain, hearing loss, postnasal drip, sinus pressure, sore throat and trouble swallowing. Eyes:  Negative for pain, discharge and visual disturbance. Respiratory:  Negative for cough, chest tightness, shortness of breath and wheezing. Cardiovascular:  Negative for chest pain and palpitations. Gastrointestinal:  Negative for abdominal pain, constipation, diarrhea, nausea and vomiting. Genitourinary:  Negative for difficulty urinating, dysuria, flank pain, frequency, hematuria and urgency. Musculoskeletal:  Negative for arthralgias, back pain, joint swelling and neck pain. Skin:  Negative for color change and rash.    Neurological: Negative for dizziness, syncope, weakness, light-headedness and headaches. Hematological:  Negative for adenopathy. Psychiatric/Behavioral:  Negative for behavioral problems, confusion and dysphoric mood. The patient is not nervous/anxious. OBJECTIVE:  There were no vitals taken for this visit. Physical Exam  Constitutional:       General: He is not in acute distress. Appearance: He is not ill-appearing or toxic-appearing. Pulmonary:      Effort: No respiratory distress. Breath sounds: No wheezing. Neurological:      Mental Status: He is alert and oriented to person, place, and time. Psychiatric:         Mood and Affect: Mood normal.         Thought Content: Thought content normal.         Judgment: Judgment normal.       ASSESSMENT/PLAN:  1. Type 2 diabetes mellitus without complication, without long-term current use of insulin (HCC)  Seems stable. Continue same  Update labs  - CBC with Auto Differential; Future  - Comprehensive Metabolic Panel; Future  - Hemoglobin A1C; Future  - Lipid Panel; Future  - Microalbumin / Creatinine Urine Ratio; Future    2. Septic enteritis  Improved. Needs follow up imaging  - CT ABDOMEN PELVIS W IV CONTRAST Additional Contrast? Oral; Future    3. Mixed hyperlipidemia  - Comprehensive Metabolic Panel; Future  - Lipid Panel; Future    4. Acquired hypothyroidism  - TSH; Future    5. Mesenteric lymphadenopathy  Needs follow up imaging for resolution to determine need to see oncology  - CT ABDOMEN PELVIS W IV CONTRAST Additional Contrast? Oral; Future    6. Retroperitoneal lymphadenopathy  As per above  - CT ABDOMEN PELVIS W IV CONTRAST Additional Contrast? Oral; Future       Arden Ravi., was evaluated through a synchronous (real-time) audio-video encounter. The patient (or guardian if applicable) is aware that this is a billable service, which includes applicable co-pays. This Virtual Visit was conducted with patient's (and/or legal guardian's) consent. The visit was conducted pursuant to the emergency declaration under the [facility], [facility] authority and the Darrell Carnad and [employer] Act. Patient identification was verified, and a caregiver was present when appropriate. The patient was located at [facility]: [facility] . Provider was located at [facility] (Appt Dept): [address] [facility],  [address] [facility]. Return in about 6 months (around 5/17/2023).

## 2022-11-29 ENCOUNTER — TELEPHONE (OUTPATIENT)
Dept: FAMILY MEDICINE CLINIC | Age: 52
End: 2022-11-29

## 2022-11-29 DIAGNOSIS — R60.9 PERIPHERAL EDEMA: Primary | ICD-10-CM

## 2022-11-29 RX ORDER — FUROSEMIDE 20 MG/1
20 TABLET ORAL DAILY PRN
Qty: 30 TABLET | Refills: 3 | Status: SHIPPED | OUTPATIENT
Start: 2022-11-29

## 2022-11-29 NOTE — TELEPHONE ENCOUNTER
Patient called stating he is having swelling in his hands and feet. He states he has a script for HCTZ to take PRN, but it usually makes his BP too low. He would like to know if there is an alternative he can take. Please advise.

## 2022-12-01 ENCOUNTER — HOSPITAL ENCOUNTER (OUTPATIENT)
Dept: CT IMAGING | Age: 52
Discharge: HOME | End: 2022-12-01
Payer: COMMERCIAL

## 2022-12-01 DIAGNOSIS — R59.0 MESENTERIC LYMPHADENOPATHY: ICD-10-CM

## 2022-12-01 DIAGNOSIS — R59.0 RETROPERITONEAL LYMPHADENOPATHY: ICD-10-CM

## 2022-12-01 DIAGNOSIS — A09: ICD-10-CM

## 2022-12-01 PROCEDURE — 74177 CT ABD & PELVIS W/CONTRAST: CPT

## 2022-12-01 PROCEDURE — 6360000004 HC RX CONTRAST MEDICATION: Performed by: CLINICAL NURSE SPECIALIST

## 2022-12-01 RX ADMIN — IOPAMIDOL 70 ML: 755 INJECTION, SOLUTION INTRAVENOUS at 09:05

## 2023-03-16 ENCOUNTER — TELEPHONE (OUTPATIENT)
Dept: PULMONOLOGY | Facility: CLINIC | Age: 53
End: 2023-03-16
Payer: COMMERCIAL

## 2023-03-16 NOTE — TELEPHONE ENCOUNTER
Patient's spouse Brenda called office asking if we have ever received a referral from Dr. Contreras in [place] for a new patient referral to see Dr. Tineo.  I told her we did receive one page of a fax and faxed it back for additional information but none was ever received.  She provided me with Dr. Contreras's phone number and I did call and got the fax number and have faxed them our NEW PATIENT REFERRAL FORM to [phone] and asked them to fill it out and attach all items for review and we would be happy to schedule him an appointment.

## 2023-03-28 ENCOUNTER — TELEPHONE (OUTPATIENT)
Dept: FAMILY MEDICINE CLINIC | Age: 53
End: 2023-03-28

## 2023-03-28 NOTE — TELEPHONE ENCOUNTER
Pt called asking if he could get a referral to a different pulmonologist and new cpap machine. Please advise.

## 2023-03-28 NOTE — TELEPHONE ENCOUNTER
Insurance requires documentation for CPAP orders, supplies. Is he able to come sometime for office visit? Ideally in office would be great and I can check his diabetes as well. If he absolutely cannot, we can do virtual for the referral and CPAP.

## 2023-04-07 DIAGNOSIS — E11.9 TYPE 2 DIABETES MELLITUS WITHOUT COMPLICATION, WITHOUT LONG-TERM CURRENT USE OF INSULIN (HCC): ICD-10-CM

## 2023-04-11 RX ORDER — GLYBURIDE 5 MG/1
5 TABLET ORAL
Qty: 30 TABLET | Refills: 2 | OUTPATIENT
Start: 2023-04-11

## 2023-05-10 ENCOUNTER — TELEPHONE (OUTPATIENT)
Dept: FAMILY MEDICINE CLINIC | Age: 53
End: 2023-05-10

## 2023-05-10 DIAGNOSIS — E11.9 TYPE 2 DIABETES MELLITUS WITHOUT COMPLICATION, WITHOUT LONG-TERM CURRENT USE OF INSULIN (HCC): ICD-10-CM

## 2023-05-10 DIAGNOSIS — E03.9 ACQUIRED HYPOTHYROIDISM: ICD-10-CM

## 2023-05-10 RX ORDER — GLYBURIDE 5 MG/1
5 TABLET ORAL
Qty: 30 TABLET | Refills: 0 | Status: SHIPPED | OUTPATIENT
Start: 2023-05-10

## 2023-05-10 RX ORDER — AMOXICILLIN 500 MG/1
500 CAPSULE ORAL 2 TIMES DAILY
Qty: 20 CAPSULE | Refills: 0 | Status: SHIPPED | OUTPATIENT
Start: 2023-05-10 | End: 2023-05-20

## 2023-05-10 RX ORDER — LEVOTHYROXINE SODIUM 0.1 MG/1
100 TABLET ORAL DAILY
Qty: 30 TABLET | Refills: 0 | Status: SHIPPED | OUTPATIENT
Start: 2023-05-10

## 2023-05-10 NOTE — TELEPHONE ENCOUNTER
Pt called requesting medication, him and his wife have caught their grand-daughter's strep throat/bronchitis. Please advise.

## 2023-05-10 NOTE — TELEPHONE ENCOUNTER
Janice Catalan. called to request a refill on his medication. Stated he knows he needs to come in for his refills because he is overdue for a f/u, but would rather wait until he is feeling better and will call to get that scheduled in the next couple weeks. Advised that since he did a VV last time that this visit would need to be in person and that if you did refill these scripts it would only be for 30 days at which point he would need to have made that appointment for. Changed the refills on meds to 0 already.        Last office visit : 11/17/2022   Next office visit : Visit date not found     Requested Prescriptions     Pending Prescriptions Disp Refills    glyBURIDE (DIABETA) 5 MG tablet 30 tablet 0     Sig: Take 1 tablet by mouth daily (with breakfast)    metFORMIN (GLUCOPHAGE) 500 MG tablet 60 tablet 0     Sig: Take 1 tablet by mouth 2 times daily (with meals)    levothyroxine (SYNTHROID) 100 MCG tablet 30 tablet 0     Sig: Take 1 tablet by mouth Daily            Victorina Fitzgerald MA

## 2023-07-17 ENCOUNTER — HOSPITAL ENCOUNTER (EMERGENCY)
Age: 53
Discharge: HOME | End: 2023-07-17
Attending: EMERGENCY MEDICINE
Payer: COMMERCIAL

## 2023-07-17 ENCOUNTER — APPOINTMENT (OUTPATIENT)
Dept: CT IMAGING | Age: 53
End: 2023-07-17
Payer: COMMERCIAL

## 2023-07-17 VITALS
BODY MASS INDEX: 33.61 KG/M2 | HEIGHT: 76 IN | TEMPERATURE: 98 F | RESPIRATION RATE: 20 BRPM | WEIGHT: 276 LBS | HEART RATE: 78 BPM | OXYGEN SATURATION: 95 % | SYSTOLIC BLOOD PRESSURE: 118 MMHG | DIASTOLIC BLOOD PRESSURE: 88 MMHG

## 2023-07-17 DIAGNOSIS — K57.32 DIVERTICULITIS OF COLON: Primary | ICD-10-CM

## 2023-07-17 LAB
ALBUMIN SERPL-MCNC: 4.6 G/DL (ref 3.5–5.2)
ALP SERPL-CCNC: 112 U/L (ref 40–130)
ALT SERPL-CCNC: 22 U/L (ref 5–41)
ANION GAP SERPL CALCULATED.3IONS-SCNC: 11 MMOL/L (ref 7–19)
AST SERPL-CCNC: 18 U/L (ref 5–40)
BILIRUB SERPL-MCNC: 0.4 MG/DL (ref 0.2–1.2)
BILIRUB UR QL STRIP: NEGATIVE
BUN SERPL-MCNC: 23 MG/DL (ref 6–20)
CALCIUM SERPL-MCNC: 9.5 MG/DL (ref 8.6–10)
CHLORIDE SERPL-SCNC: 100 MMOL/L (ref 98–111)
CLARITY UR: CLEAR
CO2 SERPL-SCNC: 26 MMOL/L (ref 22–29)
COLOR UR: YELLOW
CREAT SERPL-MCNC: 1 MG/DL (ref 0.5–1.2)
ERYTHROCYTE [DISTWIDTH] IN BLOOD BY AUTOMATED COUNT: 13.4 % (ref 11.5–14.5)
GLUCOSE SERPL-MCNC: 232 MG/DL (ref 74–109)
GLUCOSE UR STRIP.AUTO-MCNC: =>1000 MG/DL
HCT VFR BLD AUTO: 48.8 % (ref 42–52)
HGB BLD-MCNC: 15.6 G/DL (ref 14–18)
HGB UR STRIP.AUTO-MCNC: NEGATIVE MG/L
KETONES UR STRIP.AUTO-MCNC: ABNORMAL MG/DL
LACTATE BLDV-SCNC: 1.9 MMOL/L (ref 0.5–1.9)
LEUKOCYTE ESTERASE UR QL STRIP.AUTO: NEGATIVE
MCH RBC QN AUTO: 27.2 PG (ref 27–31)
MCHC RBC AUTO-ENTMCNC: 32 G/DL (ref 33–37)
MCV RBC AUTO: 85.2 FL (ref 80–94)
NITRITE UR QL STRIP.AUTO: NEGATIVE
PH UR STRIP.AUTO: 5 [PH] (ref 5–8)
PLATELET # BLD AUTO: 264 K/UL (ref 130–400)
PMV BLD AUTO: 9.6 FL (ref 9.4–12.4)
POTASSIUM SERPL-SCNC: 4.6 MMOL/L (ref 3.5–5)
PROT SERPL-MCNC: 7.8 G/DL (ref 6.6–8.7)
PROT UR STRIP.AUTO-MCNC: NEGATIVE MG/DL
RBC # BLD AUTO: 5.73 M/UL (ref 4.7–6.1)
SODIUM SERPL-SCNC: 137 MMOL/L (ref 136–145)
SP GR UR STRIP.AUTO: >=1.045 (ref 1–1.03)
UROBILINOGEN UR STRIP.AUTO-MCNC: 0.2 E.U./DL
WBC # BLD AUTO: 11.3 K/UL (ref 4.8–10.8)

## 2023-07-17 PROCEDURE — 83605 ASSAY OF LACTIC ACID: CPT

## 2023-07-17 PROCEDURE — 96374 THER/PROPH/DIAG INJ IV PUSH: CPT

## 2023-07-17 PROCEDURE — 96375 TX/PRO/DX INJ NEW DRUG ADDON: CPT

## 2023-07-17 PROCEDURE — 6360000004 HC RX CONTRAST MEDICATION: Performed by: EMERGENCY MEDICINE

## 2023-07-17 PROCEDURE — 96376 TX/PRO/DX INJ SAME DRUG ADON: CPT

## 2023-07-17 PROCEDURE — 80053 COMPREHEN METABOLIC PANEL: CPT

## 2023-07-17 PROCEDURE — 85027 COMPLETE CBC AUTOMATED: CPT

## 2023-07-17 PROCEDURE — 74177 CT ABD & PELVIS W/CONTRAST: CPT

## 2023-07-17 PROCEDURE — 81003 URINALYSIS AUTO W/O SCOPE: CPT

## 2023-07-17 PROCEDURE — 2580000003 HC RX 258: Performed by: EMERGENCY MEDICINE

## 2023-07-17 PROCEDURE — 6360000002 HC RX W HCPCS: Performed by: EMERGENCY MEDICINE

## 2023-07-17 PROCEDURE — 99285 EMERGENCY DEPT VISIT HI MDM: CPT

## 2023-07-17 PROCEDURE — 36415 COLL VENOUS BLD VENIPUNCTURE: CPT

## 2023-07-17 RX ORDER — FENTANYL CITRATE 50 UG/ML
50 INJECTION, SOLUTION INTRAMUSCULAR; INTRAVENOUS ONCE
Status: COMPLETED | OUTPATIENT
Start: 2023-07-17 | End: 2023-07-17

## 2023-07-17 RX ORDER — HYDROCODONE BITARTRATE AND ACETAMINOPHEN 5; 325 MG/1; MG/1
1 TABLET ORAL EVERY 6 HOURS PRN
Qty: 12 TABLET | Refills: 0 | Status: SHIPPED | OUTPATIENT
Start: 2023-07-17 | End: 2023-07-20

## 2023-07-17 RX ORDER — ONDANSETRON 2 MG/ML
4 INJECTION INTRAMUSCULAR; INTRAVENOUS ONCE
Status: COMPLETED | OUTPATIENT
Start: 2023-07-17 | End: 2023-07-17

## 2023-07-17 RX ORDER — ONDANSETRON 4 MG/1
4 TABLET, ORALLY DISINTEGRATING ORAL 3 TIMES DAILY PRN
Qty: 21 TABLET | Refills: 0 | Status: SHIPPED | OUTPATIENT
Start: 2023-07-17

## 2023-07-17 RX ORDER — MORPHINE SULFATE 4 MG/ML
4 INJECTION, SOLUTION INTRAMUSCULAR; INTRAVENOUS ONCE
Status: DISCONTINUED | OUTPATIENT
Start: 2023-07-17 | End: 2023-07-17

## 2023-07-17 RX ORDER — 0.9 % SODIUM CHLORIDE 0.9 %
1000 INTRAVENOUS SOLUTION INTRAVENOUS ONCE
Status: COMPLETED | OUTPATIENT
Start: 2023-07-17 | End: 2023-07-17

## 2023-07-17 RX ORDER — HYDROMORPHONE HYDROCHLORIDE 1 MG/ML
1 INJECTION, SOLUTION INTRAMUSCULAR; INTRAVENOUS; SUBCUTANEOUS ONCE
Status: COMPLETED | OUTPATIENT
Start: 2023-07-17 | End: 2023-07-17

## 2023-07-17 RX ORDER — AMOXICILLIN AND CLAVULANATE POTASSIUM 875; 125 MG/1; MG/1
1 TABLET, FILM COATED ORAL 2 TIMES DAILY
Qty: 14 TABLET | Refills: 0 | Status: SHIPPED | OUTPATIENT
Start: 2023-07-17 | End: 2023-07-24

## 2023-07-17 RX ADMIN — FENTANYL CITRATE 50 MCG: 50 INJECTION, SOLUTION INTRAMUSCULAR; INTRAVENOUS at 15:54

## 2023-07-17 RX ADMIN — SODIUM CHLORIDE 1000 ML: 9 INJECTION, SOLUTION INTRAVENOUS at 15:52

## 2023-07-17 RX ADMIN — HYDROMORPHONE HYDROCHLORIDE 1 MG: 1 INJECTION, SOLUTION INTRAMUSCULAR; INTRAVENOUS; SUBCUTANEOUS at 17:23

## 2023-07-17 RX ADMIN — IOPAMIDOL 70 ML: 755 INJECTION, SOLUTION INTRAVENOUS at 16:26

## 2023-07-17 RX ADMIN — ONDANSETRON 4 MG: 2 INJECTION INTRAMUSCULAR; INTRAVENOUS at 15:54

## 2023-07-17 RX ADMIN — ONDANSETRON 4 MG: 2 INJECTION INTRAMUSCULAR; INTRAVENOUS at 18:30

## 2023-07-17 ASSESSMENT — ENCOUNTER SYMPTOMS
VOMITING: 0
BLOOD IN STOOL: 0
ABDOMINAL PAIN: 1
EYE PAIN: 0
DIARRHEA: 0
SHORTNESS OF BREATH: 0

## 2023-07-17 ASSESSMENT — PAIN SCALES - GENERAL
PAINLEVEL_OUTOF10: 8
PAINLEVEL_OUTOF10: 8

## 2023-07-17 ASSESSMENT — PAIN DESCRIPTION - LOCATION: LOCATION: ABDOMEN

## 2023-07-17 NOTE — ED PROVIDER NOTES
[facility] EMERGENCY DEPT  eMERGENCY dEPARTMENT eNCOUnter      Pt Name: Gus William MRN: [id number]  Birthdate 1970  Date of evaluation: 7/17/2023  Provider: Ale Law MD    CHIEF COMPLAINT       Chief Complaint   Patient presents with    Abdominal Pain     Pt states his stomach was hurting this morning and he thought he just needed to go to the restroom but the pain has just gotten progressively worse. He states he has a hernia, but isn't sure where it is. HISTORY OF PRESENT ILLNESS   (Location/Symptom, Timing/Onset,Context/Setting, Quality, Duration, Modifying Factors, Severity)  Note limiting factors. Gus William is a 48 y.o. male who presents to the emergency department complaint abdominal pain. Patient states that he woke this morning has had pain in his left lower quadrant. No injuries or trauma. Pain was mild when he woke this morning but is gradually worsened throughout the day and is severe now. Again, pain localized left lower quadrant does not radiate. No nausea vomiting diarrhea. No black or bloody stools. No urinary complaints. Has not had similar symptoms before. Has had diverticulitis in the past.  Has also had umbilical hernia and ventral hernia repairs in the past.  Pain constant without exacerbating alleviating factors. HPI    NursingNotes were reviewed. REVIEW OF SYSTEMS    (2-9 systems for level 4, 10 or more for level 5)     Review of Systems   Constitutional:  Negative for fever. Eyes:  Negative for pain. Respiratory:  Negative for shortness of breath. Cardiovascular:  Negative for chest pain and palpitations. Gastrointestinal:  Positive for abdominal pain. Negative for blood in stool, diarrhea and vomiting. Genitourinary:  Negative for difficulty urinating, dysuria and flank pain. Skin:  Negative for rash. Neurological:  Negative for weakness and headaches. All other systems reviewed and are negative.     A complete review of systems

## 2023-07-19 ENCOUNTER — FOLLOWUP TELEPHONE ENCOUNTER (OUTPATIENT)
Dept: FAMILY MEDICINE CLINIC | Age: 53
End: 2023-07-19

## 2023-08-07 DIAGNOSIS — E03.9 ACQUIRED HYPOTHYROIDISM: ICD-10-CM

## 2023-08-07 DIAGNOSIS — E11.9 TYPE 2 DIABETES MELLITUS WITHOUT COMPLICATION, WITHOUT LONG-TERM CURRENT USE OF INSULIN (HCC): ICD-10-CM

## 2023-08-08 RX ORDER — LEVOTHYROXINE SODIUM 0.1 MG/1
100 TABLET ORAL DAILY
Qty: 30 TABLET | Refills: 0 | OUTPATIENT
Start: 2023-08-08

## 2023-08-08 RX ORDER — GLYBURIDE 5 MG/1
5 TABLET ORAL
Qty: 30 TABLET | Refills: 0 | OUTPATIENT
Start: 2023-08-08

## 2023-08-08 NOTE — TELEPHONE ENCOUNTER
Tika Beckford called to request a refill on his medication.       Last office visit : 7/19/2023   Next office visit : Visit date not found     Requested Prescriptions     Pending Prescriptions Disp Refills    levothyroxine (SYNTHROID) 100 MCG tablet [Pharmacy Med Name: LEVOTHYROXINE SODIUM 100  Tablet] 30 tablet 0     Sig: TAKE 1 TABLET BY MOUTH DAILY    glyBURIDE (DIABETA) 5 MG tablet [Pharmacy Med Name: GLYBURIDE 5 MG TABS 5 Tablet] 30 tablet 0     Sig: TAKE 1 TABLET BY MOUTH DAILY (WITH BREAKFAST)            Tracey Ching MA

## 2023-08-10 ENCOUNTER — TELEPHONE (OUTPATIENT)
Dept: FAMILY MEDICINE CLINIC | Age: 53
End: 2023-08-10

## 2023-08-10 ENCOUNTER — OFFICE VISIT (OUTPATIENT)
Dept: FAMILY MEDICINE CLINIC | Age: 53
End: 2023-08-10
Payer: COMMERCIAL

## 2023-08-10 VITALS
BODY MASS INDEX: 34.81 KG/M2 | HEART RATE: 85 BPM | TEMPERATURE: 97.7 F | SYSTOLIC BLOOD PRESSURE: 100 MMHG | WEIGHT: 286 LBS | DIASTOLIC BLOOD PRESSURE: 70 MMHG | OXYGEN SATURATION: 96 % | RESPIRATION RATE: 17 BRPM

## 2023-08-10 DIAGNOSIS — E11.9 TYPE 2 DIABETES MELLITUS WITHOUT COMPLICATION, WITHOUT LONG-TERM CURRENT USE OF INSULIN (HCC): ICD-10-CM

## 2023-08-10 DIAGNOSIS — Z12.11 ENCOUNTER FOR SCREENING FOR MALIGNANT NEOPLASM OF COLON: ICD-10-CM

## 2023-08-10 DIAGNOSIS — Z00.00 ENCOUNTER FOR WELL ADULT EXAM WITHOUT ABNORMAL FINDINGS: Primary | ICD-10-CM

## 2023-08-10 DIAGNOSIS — E03.9 ACQUIRED HYPOTHYROIDISM: ICD-10-CM

## 2023-08-10 DIAGNOSIS — Z12.5 ENCOUNTER FOR SCREENING FOR MALIGNANT NEOPLASM OF PROSTATE: ICD-10-CM

## 2023-08-10 LAB — HBA1C MFR BLD: 8.1 %

## 2023-08-10 PROCEDURE — 3078F DIAST BP <80 MM HG: CPT | Performed by: CLINICAL NURSE SPECIALIST

## 2023-08-10 PROCEDURE — 99396 PREV VISIT EST AGE 40-64: CPT | Performed by: CLINICAL NURSE SPECIALIST

## 2023-08-10 PROCEDURE — 3074F SYST BP LT 130 MM HG: CPT | Performed by: CLINICAL NURSE SPECIALIST

## 2023-08-10 RX ORDER — GLYBURIDE 5 MG/1
5 TABLET ORAL
Qty: 30 TABLET | Refills: 2 | Status: SHIPPED | OUTPATIENT
Start: 2023-08-10

## 2023-08-10 RX ORDER — LEVOTHYROXINE SODIUM 0.1 MG/1
100 TABLET ORAL DAILY
Qty: 30 TABLET | Refills: 2 | Status: SHIPPED | OUTPATIENT
Start: 2023-08-10

## 2023-08-10 ASSESSMENT — ENCOUNTER SYMPTOMS
VOMITING: 0
COLOR CHANGE: 0
EYE DISCHARGE: 0
WHEEZING: 0
CHEST TIGHTNESS: 0
BACK PAIN: 0
COUGH: 0
ABDOMINAL PAIN: 0
NAUSEA: 0
TROUBLE SWALLOWING: 0
CONSTIPATION: 0
DIARRHEA: 0
EYE PAIN: 0
SINUS PRESSURE: 0
SHORTNESS OF BREATH: 0
SORE THROAT: 0

## 2023-08-10 ASSESSMENT — PATIENT HEALTH QUESTIONNAIRE - PHQ9
1. LITTLE INTEREST OR PLEASURE IN DOING THINGS: 0
SUM OF ALL RESPONSES TO PHQ9 QUESTIONS 1 & 2: 0
2. FEELING DOWN, DEPRESSED OR HOPELESS: 0
SUM OF ALL RESPONSES TO PHQ QUESTIONS 1-9: 0

## 2023-08-10 NOTE — PROGRESS NOTES
SUBJECTIVE:  Ebfernando Davis is a 48 y.o. who presents today for Follow-up      HPI    Mr Yuvonne Holter presents today for follow up and physical  PSA DUE  Colon screening due  Immunizations defers  Eye exam UTD    Type 2 DM without complication, improving. A1c 8.1 down from 8.7  She reports compliance with metformin and glyburide. No noted hypos. Home fasting glucose ranges 120-150  Eye exam recently- denies any report of diabetic retinopathy    Hypothyroidism, on replacement. No symptoms of hyper or hypothyroidism. Mood stable. Weight expected. No complaints today. Past Medical History:   Diagnosis Date    Acid reflux     Adenomatous goiter     2013 left thyroidectomy  Dr Stephanie Preciado    Arthralgia of multiple sites     Arthritis     osteo    Chronic back pain     COVID-19     Effusion of patella     Gastric ulcer     Knee pain     Medial meniscus tear     Mixed hyperlipidemia     Obesity     Pain of right hand     Postoperative hypothyroidism     left lobectomy, Dr Stephanie Preciado     Prediabetes     Prolonged emergence from general anesthesia     fights upon emergence; works in [employer], woke feeling he was at work and being threatened.     Simple goiter     Solitary thyroid nodule     Thyroid disease      Past Surgical History:   Procedure Laterality Date    CHOLECYSTECTOMY, LAPAROSCOPIC      HERNIA REPAIR      umbilical    INGUINAL HERNIA REPAIR Left     age 9    KNEE ARTHROSCOPY Right     x2    KNEE CARTILAGE SURGERY Left 05/12/2016    ARTHROSCOPY PARTIAL MEDIAL MENISCECTOMY KNEE performed by Jo Burrows DO at [facility]      wisdom teeth removed    THYROIDECTOMY, PARTIAL      Left thyroidectomy, Dr. Stephanie Preciado, 2013    [address]       Family History   Problem Relation Age of Onset    Other Mother         hypothyroidism    Heart Disease Mother     Diabetes Mother     Heart Attack Father     Breast Cancer Maternal Aunt     Cancer Paternal Aunt      Social History     Tobacco Use

## 2023-08-10 NOTE — TELEPHONE ENCOUNTER
Pt called and left a VM stating he needed an appointment in order to get his meds refilled. 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

## 2023-08-10 NOTE — TELEPHONE ENCOUNTER
Pt aware and voiced understanding. Stated he would like to slowly work his way up to the 1000 twice daily due to needing to go to the bathroom a lot with it. Said that was fine but the new script was sent in for the 1000 and if he has issues with it, or diabetes in general, to reach back out and we can revisit.

## 2023-08-10 NOTE — TELEPHONE ENCOUNTER
----- Message from DENIS Doyle sent at 8/10/2023 11:04 AM CDT -----  A1c 8.1- this is better but would ideally like him below 7 just to avoid complications from diabetes. I would like to increase metformin to 1000mg twice daily. Adjusted rx. Have him return to lab in the next 2-3 months to get fasting labs for me- orders placed.

## 2023-09-07 DIAGNOSIS — E11.9 TYPE 2 DIABETES MELLITUS WITHOUT COMPLICATION, WITHOUT LONG-TERM CURRENT USE OF INSULIN (HCC): ICD-10-CM

## 2023-09-07 DIAGNOSIS — Z12.5 ENCOUNTER FOR SCREENING FOR MALIGNANT NEOPLASM OF PROSTATE: ICD-10-CM

## 2023-09-07 DIAGNOSIS — E03.9 ACQUIRED HYPOTHYROIDISM: ICD-10-CM

## 2023-09-07 LAB
ALBUMIN SERPL-MCNC: 4.1 G/DL (ref 3.5–5.2)
ALP SERPL-CCNC: 91 U/L (ref 40–130)
ALT SERPL-CCNC: 21 U/L (ref 5–41)
ANION GAP SERPL CALCULATED.3IONS-SCNC: 14 MMOL/L (ref 7–19)
AST SERPL-CCNC: 15 U/L (ref 5–40)
BASOPHILS # BLD: 0 K/UL (ref 0–0.2)
BASOPHILS NFR BLD: 0.4 % (ref 0–1)
BILIRUB SERPL-MCNC: 0.5 MG/DL (ref 0.2–1.2)
BUN SERPL-MCNC: 20 MG/DL (ref 6–20)
CALCIUM SERPL-MCNC: 8.5 MG/DL (ref 8.6–10)
CHLORIDE SERPL-SCNC: 102 MMOL/L (ref 98–111)
CHOLEST SERPL-MCNC: 175 MG/DL (ref 160–199)
CO2 SERPL-SCNC: 25 MMOL/L (ref 22–29)
CREAT SERPL-MCNC: 0.9 MG/DL (ref 0.5–1.2)
CREAT UR-MCNC: 319.5 MG/DL (ref 39–259)
EOSINOPHIL # BLD: 0.2 K/UL (ref 0–0.6)
EOSINOPHIL NFR BLD: 2.1 % (ref 0–5)
ERYTHROCYTE [DISTWIDTH] IN BLOOD BY AUTOMATED COUNT: 13.6 % (ref 11.5–14.5)
GLUCOSE SERPL-MCNC: 160 MG/DL (ref 74–109)
HBA1C MFR BLD: 7.6 % (ref 4–6)
HCT VFR BLD AUTO: 46.5 % (ref 42–52)
HDLC SERPL-MCNC: 36 MG/DL (ref 55–121)
HGB BLD-MCNC: 14.9 G/DL (ref 14–18)
IMM GRANULOCYTES # BLD: 0.1 K/UL
LDLC SERPL CALC-MCNC: 99 MG/DL
LYMPHOCYTES # BLD: 2.1 K/UL (ref 1.1–4.5)
LYMPHOCYTES NFR BLD: 26.8 % (ref 20–40)
MCH RBC QN AUTO: 27.6 PG (ref 27–31)
MCHC RBC AUTO-ENTMCNC: 32 G/DL (ref 33–37)
MCV RBC AUTO: 86.1 FL (ref 80–94)
MICROALBUMIN UR-MCNC: <1.2 MG/DL (ref 0–19)
MICROALBUMIN/CREAT UR-RTO: ABNORMAL MG/G
MONOCYTES # BLD: 0.5 K/UL (ref 0–0.9)
MONOCYTES NFR BLD: 6.5 % (ref 0–10)
NEUTROPHILS # BLD: 5.1 K/UL (ref 1.5–7.5)
NEUTS SEG NFR BLD: 63.6 % (ref 50–65)
NONINV COLON CA DNA+OCC BLD SCRN STL QL: NORMAL
PLATELET # BLD AUTO: 234 K/UL (ref 130–400)
PMV BLD AUTO: 9.4 FL (ref 9.4–12.4)
POTASSIUM SERPL-SCNC: 4.1 MMOL/L (ref 3.5–5)
PROT SERPL-MCNC: 7.2 G/DL (ref 6.6–8.7)
PSA SERPL-MCNC: 0.66 NG/ML (ref 0–4)
RBC # BLD AUTO: 5.4 M/UL (ref 4.7–6.1)
SODIUM SERPL-SCNC: 141 MMOL/L (ref 136–145)
TRIGL SERPL-MCNC: 198 MG/DL (ref 0–149)
TSH SERPL DL<=0.005 MIU/L-ACNC: 3.02 UIU/ML (ref 0.27–4.2)
WBC # BLD AUTO: 8 K/UL (ref 4.8–10.8)

## 2023-09-22 ENCOUNTER — TELEPHONE (OUTPATIENT)
Dept: FAMILY MEDICINE CLINIC | Age: 53
End: 2023-09-22

## 2023-09-22 RX ORDER — AMOXICILLIN 500 MG/1
500 CAPSULE ORAL 2 TIMES DAILY
Qty: 20 CAPSULE | Refills: 0 | Status: SHIPPED | OUTPATIENT
Start: 2023-09-22 | End: 2023-10-02

## 2023-09-22 NOTE — TELEPHONE ENCOUNTER
Pt left VM stating his wife and grand daughter were just dx with strep and he now has their symptoms. 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 Would like meds called in please.

## 2023-10-03 LAB — NONINV COLON CA DNA+OCC BLD SCRN STL QL: NEGATIVE

## 2023-12-13 DIAGNOSIS — E11.9 TYPE 2 DIABETES MELLITUS WITHOUT COMPLICATION, WITHOUT LONG-TERM CURRENT USE OF INSULIN (HCC): ICD-10-CM

## 2023-12-13 DIAGNOSIS — E03.9 ACQUIRED HYPOTHYROIDISM: ICD-10-CM

## 2023-12-13 RX ORDER — LEVOTHYROXINE SODIUM 0.1 MG/1
100 TABLET ORAL DAILY
Qty: 30 TABLET | Refills: 2 | Status: SHIPPED | OUTPATIENT
Start: 2023-12-13

## 2023-12-13 RX ORDER — GLYBURIDE 5 MG/1
5 TABLET ORAL
Qty: 30 TABLET | Refills: 2 | Status: SHIPPED | OUTPATIENT
Start: 2023-12-13

## 2024-01-20 DIAGNOSIS — E11.9 TYPE 2 DIABETES MELLITUS WITHOUT COMPLICATION, WITHOUT LONG-TERM CURRENT USE OF INSULIN (HCC): ICD-10-CM

## 2024-01-23 NOTE — TELEPHONE ENCOUNTER
Arden Walker Jr. called to request a refill on his medication.      Last office visit : 8/10/2023   Next office visit : Visit date not found     Requested Prescriptions     Pending Prescriptions Disp Refills    metFORMIN (GLUCOPHAGE) 500 MG tablet [Pharmacy Med Name: METFORMIN  MG TABLET 500 Tablet] 60 tablet 2     Sig: TAKE 1 TABLET BY MOUTH 2 TIMES DAILY (WITH MEALS)            Gracia Faustin MA

## 2024-04-23 DIAGNOSIS — E11.9 TYPE 2 DIABETES MELLITUS WITHOUT COMPLICATION, WITHOUT LONG-TERM CURRENT USE OF INSULIN (HCC): ICD-10-CM

## 2024-04-23 DIAGNOSIS — E03.9 ACQUIRED HYPOTHYROIDISM: ICD-10-CM

## 2024-04-23 RX ORDER — LEVOTHYROXINE SODIUM 0.1 MG/1
100 TABLET ORAL DAILY
Qty: 30 TABLET | Refills: 2 | OUTPATIENT
Start: 2024-04-23

## 2024-04-23 RX ORDER — GLYBURIDE 5 MG/1
5 TABLET ORAL
Qty: 30 TABLET | Refills: 2 | OUTPATIENT
Start: 2024-04-23

## 2024-04-23 NOTE — TELEPHONE ENCOUNTER
Arden Walker Jr. called to request a refill on his medication.      Last office visit : 8/10/2023   Next office visit : Visit date not found     Requested Prescriptions     Pending Prescriptions Disp Refills    levothyroxine (SYNTHROID) 100 MCG tablet [Pharmacy Med Name: LEVOTHYROXINE SODIUM 100  Tablet] 30 tablet 2     Sig: TAKE 1 TABLET BY MOUTH DAILY    glyBURIDE (DIABETA) 5 MG tablet [Pharmacy Med Name: GLYBURIDE 5 MG TABLET 5 Tablet] 30 tablet 2     Sig: TAKE 1 TABLET BY MOUTH ONCE DAILY WITH BREAKFAST            Gracia Faustin MA

## 2024-05-13 ENCOUNTER — OFFICE VISIT (OUTPATIENT)
Dept: FAMILY MEDICINE CLINIC | Age: 54
End: 2024-05-13
Payer: COMMERCIAL

## 2024-05-13 VITALS
BODY MASS INDEX: 33.47 KG/M2 | DIASTOLIC BLOOD PRESSURE: 72 MMHG | WEIGHT: 275 LBS | OXYGEN SATURATION: 97 % | SYSTOLIC BLOOD PRESSURE: 108 MMHG | HEART RATE: 68 BPM | TEMPERATURE: 98.4 F

## 2024-05-13 DIAGNOSIS — R05.9 COUGH, UNSPECIFIED TYPE: Primary | ICD-10-CM

## 2024-05-13 DIAGNOSIS — R50.9 FEVER, UNSPECIFIED FEVER CAUSE: ICD-10-CM

## 2024-05-13 DIAGNOSIS — J01.90 ACUTE SINUSITIS, RECURRENCE NOT SPECIFIED, UNSPECIFIED LOCATION: ICD-10-CM

## 2024-05-13 DIAGNOSIS — E11.9 TYPE 2 DIABETES MELLITUS WITHOUT COMPLICATION, WITHOUT LONG-TERM CURRENT USE OF INSULIN (HCC): ICD-10-CM

## 2024-05-13 DIAGNOSIS — E03.9 ACQUIRED HYPOTHYROIDISM: ICD-10-CM

## 2024-05-13 LAB
B PARAP IS1001 DNA NPH QL NAA+NON-PROBE: NOT DETECTED
B PERT.PT PRMT NPH QL NAA+NON-PROBE: NOT DETECTED
C PNEUM DNA NPH QL NAA+NON-PROBE: NOT DETECTED
FLUAV RNA NPH QL NAA+NON-PROBE: NOT DETECTED
FLUBV RNA NPH QL NAA+NON-PROBE: NOT DETECTED
HADV DNA NPH QL NAA+NON-PROBE: NOT DETECTED
HCOV 229E RNA NPH QL NAA+NON-PROBE: NOT DETECTED
HCOV HKU1 RNA NPH QL NAA+NON-PROBE: NOT DETECTED
HCOV NL63 RNA NPH QL NAA+NON-PROBE: NOT DETECTED
HCOV OC43 RNA NPH QL NAA+NON-PROBE: NOT DETECTED
HMPV RNA NPH QL NAA+NON-PROBE: DETECTED
HPIV1 RNA NPH QL NAA+NON-PROBE: NOT DETECTED
HPIV2 RNA NPH QL NAA+NON-PROBE: NOT DETECTED
HPIV3 RNA NPH QL NAA+NON-PROBE: NOT DETECTED
HPIV4 RNA NPH QL NAA+NON-PROBE: NOT DETECTED
INFLUENZA A ANTIBODY: NORMAL
INFLUENZA B ANTIBODY: NORMAL
M PNEUMO DNA NPH QL NAA+NON-PROBE: NOT DETECTED
RSV RNA NPH QL NAA+NON-PROBE: NOT DETECTED
RV+EV RNA NPH QL NAA+NON-PROBE: NOT DETECTED
S PYO AG THROAT QL: NORMAL
SARS-COV-2 RNA NPH QL NAA+NON-PROBE: NOT DETECTED

## 2024-05-13 PROCEDURE — 3074F SYST BP LT 130 MM HG: CPT | Performed by: NURSE PRACTITIONER

## 2024-05-13 PROCEDURE — 99213 OFFICE O/P EST LOW 20 MIN: CPT | Performed by: NURSE PRACTITIONER

## 2024-05-13 PROCEDURE — 3078F DIAST BP <80 MM HG: CPT | Performed by: NURSE PRACTITIONER

## 2024-05-13 PROCEDURE — 87804 INFLUENZA ASSAY W/OPTIC: CPT | Performed by: NURSE PRACTITIONER

## 2024-05-13 PROCEDURE — 87880 STREP A ASSAY W/OPTIC: CPT | Performed by: NURSE PRACTITIONER

## 2024-05-13 RX ORDER — GLYBURIDE 5 MG/1
5 TABLET ORAL
Qty: 30 TABLET | Refills: 0 | Status: SHIPPED | OUTPATIENT
Start: 2024-05-13

## 2024-05-13 RX ORDER — DEXTROMETHORPHAN HYDROBROMIDE AND PROMETHAZINE HYDROCHLORIDE 15; 6.25 MG/5ML; MG/5ML
5 SYRUP ORAL 4 TIMES DAILY PRN
Qty: 140 ML | Refills: 0 | Status: SHIPPED | OUTPATIENT
Start: 2024-05-13 | End: 2024-05-20

## 2024-05-13 RX ORDER — CEFDINIR 300 MG/1
300 CAPSULE ORAL 2 TIMES DAILY
Qty: 20 CAPSULE | Refills: 0 | Status: SHIPPED | OUTPATIENT
Start: 2024-05-13 | End: 2024-05-23

## 2024-05-13 RX ORDER — LEVOTHYROXINE SODIUM 0.1 MG/1
100 TABLET ORAL DAILY
Qty: 30 TABLET | Refills: 0 | Status: SHIPPED | OUTPATIENT
Start: 2024-05-13

## 2024-05-13 SDOH — ECONOMIC STABILITY: FOOD INSECURITY: WITHIN THE PAST 12 MONTHS, YOU WORRIED THAT YOUR FOOD WOULD RUN OUT BEFORE YOU GOT MONEY TO BUY MORE.: NEVER TRUE

## 2024-05-13 SDOH — ECONOMIC STABILITY: HOUSING INSECURITY
IN THE LAST 12 MONTHS, WAS THERE A TIME WHEN YOU DID NOT HAVE A STEADY PLACE TO SLEEP OR SLEPT IN A SHELTER (INCLUDING NOW)?: NO

## 2024-05-13 SDOH — ECONOMIC STABILITY: FOOD INSECURITY: WITHIN THE PAST 12 MONTHS, THE FOOD YOU BOUGHT JUST DIDN'T LAST AND YOU DIDN'T HAVE MONEY TO GET MORE.: NEVER TRUE

## 2024-05-13 SDOH — ECONOMIC STABILITY: INCOME INSECURITY: HOW HARD IS IT FOR YOU TO PAY FOR THE VERY BASICS LIKE FOOD, HOUSING, MEDICAL CARE, AND HEATING?: NOT HARD AT ALL

## 2024-05-13 ASSESSMENT — PATIENT HEALTH QUESTIONNAIRE - PHQ9
1. LITTLE INTEREST OR PLEASURE IN DOING THINGS: NOT AT ALL
SUM OF ALL RESPONSES TO PHQ QUESTIONS 1-9: 0
SUM OF ALL RESPONSES TO PHQ QUESTIONS 1-9: 0
SUM OF ALL RESPONSES TO PHQ9 QUESTIONS 1 & 2: 0
SUM OF ALL RESPONSES TO PHQ QUESTIONS 1-9: 0
2. FEELING DOWN, DEPRESSED OR HOPELESS: NOT AT ALL
SUM OF ALL RESPONSES TO PHQ QUESTIONS 1-9: 0

## 2024-05-13 ASSESSMENT — ENCOUNTER SYMPTOMS
DIARRHEA: 1
SORE THROAT: 1
COUGH: 1
SINUS PRESSURE: 1

## 2024-05-13 NOTE — PROGRESS NOTES
SUBJECTIVE:  Patient ID: Arden Walker Jr. is a 53 y.o. male.  Sick   HPI:   Fever   Associated symptoms include congestion, coughing, diarrhea, headaches and a sore throat.   Cough  Associated symptoms include a fever, headaches, myalgias, postnasal drip and a sore throat.   Generalized Body Aches  Associated symptoms include arthralgias, congestion, coughing, a fever, headaches, myalgias and a sore throat.      Started Friday   Symptoms sinus felling bad. Drainage, and body pain,   Coughing a lot   Taking Mucinex. Claritin, tylenol   Fever 102  Sore throat   States he also has been identified as having long COVID  Past Medical History:   Diagnosis Date    Acid reflux     Adenomatous goiter     2013 left thyroidectomy  Dr Loving    Arthralgia of multiple sites     Arthritis     osteo    Chronic back pain     COVID-19     Effusion of patella     Gastric ulcer     Knee pain     Medial meniscus tear     Mixed hyperlipidemia     Obesity     Pain of right hand     Postoperative hypothyroidism     left lobectomy, Dr Loving     Prediabetes     Prolonged emergence from general anesthesia     fights upon emergence; works in [employer], woke feeling he was at work and being threatened.    Simple goiter     Solitary thyroid nodule     Thyroid disease       Prior to Visit Medications    Medication Sig Taking? Authorizing Provider   ondansetron (ZOFRAN-ODT) 4 MG disintegrating tablet Take 1 tablet by mouth 3 times daily as needed for Nausea or Vomiting Yes Ayaan Carnes MD   furosemide (LASIX) 20 MG tablet Take 1 tablet by mouth daily as needed (swelling) Yes Fay Dickens APRN   dicyclomine (BENTYL) 10 MG capsule Take 2 capsules by mouth 4 times daily Yes Elizabeth Keenan APRN   clotrimazole-betamethasone (LOTRISONE) 1-0.05 % cream Apply topically 2 times daily. Yes Fay Dickens APRN   blood glucose monitor kit and supplies Glucometer of choice per insurance coverage.  Test once daily.

## 2024-05-14 ENCOUNTER — TELEPHONE (OUTPATIENT)
Dept: FAMILY MEDICINE CLINIC | Age: 54
End: 2024-05-14

## 2024-05-14 RX ORDER — PREDNISONE 20 MG/1
20 TABLET ORAL 2 TIMES DAILY
Qty: 10 TABLET | Refills: 0 | Status: SHIPPED | OUTPATIENT
Start: 2024-05-14 | End: 2024-05-19

## 2024-05-14 RX ORDER — ALBUTEROL SULFATE 90 UG/1
2 AEROSOL, METERED RESPIRATORY (INHALATION) EVERY 6 HOURS PRN
Qty: 18 G | Refills: 0 | Status: SHIPPED | OUTPATIENT
Start: 2024-05-14

## 2024-06-13 ENCOUNTER — TELEPHONE (OUTPATIENT)
Dept: FAMILY MEDICINE CLINIC | Age: 54
End: 2024-06-13

## 2024-06-13 NOTE — TELEPHONE ENCOUNTER
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 Pt stated he also needed an appointment with us. Upon callback pt stated that he was having his DOT with  and they needed his most recent A1C. Provided fax number. Then scheduled pt for tomorrow for his med refill f/u with us. Pt voiced thanks for help and disconnected call with no additional needs to be met at this time.

## 2024-06-14 ENCOUNTER — TELEPHONE (OUTPATIENT)
Dept: FAMILY MEDICINE CLINIC | Age: 54
End: 2024-06-14

## 2024-06-14 ENCOUNTER — OFFICE VISIT (OUTPATIENT)
Dept: FAMILY MEDICINE CLINIC | Age: 54
End: 2024-06-14
Payer: COMMERCIAL

## 2024-06-14 VITALS
SYSTOLIC BLOOD PRESSURE: 124 MMHG | OXYGEN SATURATION: 98 % | WEIGHT: 280 LBS | DIASTOLIC BLOOD PRESSURE: 78 MMHG | TEMPERATURE: 97.6 F | HEART RATE: 72 BPM | RESPIRATION RATE: 17 BRPM | BODY MASS INDEX: 34.08 KG/M2

## 2024-06-14 DIAGNOSIS — R60.0 PERIPHERAL EDEMA: ICD-10-CM

## 2024-06-14 DIAGNOSIS — E11.9 TYPE 2 DIABETES MELLITUS WITHOUT COMPLICATION, WITHOUT LONG-TERM CURRENT USE OF INSULIN (HCC): Primary | ICD-10-CM

## 2024-06-14 DIAGNOSIS — E03.9 ACQUIRED HYPOTHYROIDISM: ICD-10-CM

## 2024-06-14 DIAGNOSIS — E11.9 TYPE 2 DIABETES MELLITUS WITHOUT COMPLICATION, WITHOUT LONG-TERM CURRENT USE OF INSULIN (HCC): ICD-10-CM

## 2024-06-14 DIAGNOSIS — G47.33 OBSTRUCTIVE SLEEP APNEA SYNDROME: ICD-10-CM

## 2024-06-14 PROBLEM — R10.84 GENERALIZED ABDOMINAL PAIN: Status: RESOLVED | Noted: 2022-09-07 | Resolved: 2024-06-14

## 2024-06-14 PROBLEM — E87.20 LACTIC ACIDOSIS: Status: RESOLVED | Noted: 2022-09-05 | Resolved: 2024-06-14

## 2024-06-14 PROBLEM — R10.31 RIGHT INGUINAL PAIN: Status: RESOLVED | Noted: 2020-10-15 | Resolved: 2024-06-14

## 2024-06-14 PROBLEM — R53.82 CHRONIC FATIGUE: Status: RESOLVED | Noted: 2018-08-23 | Resolved: 2024-06-14

## 2024-06-14 PROBLEM — Z86.16 HISTORY OF COVID-19: Status: RESOLVED | Noted: 2021-03-30 | Resolved: 2024-06-14

## 2024-06-14 PROBLEM — R73.09 ELEVATED GLUCOSE: Status: RESOLVED | Noted: 2019-09-06 | Resolved: 2024-06-14

## 2024-06-14 LAB
ALBUMIN SERPL-MCNC: 4.3 G/DL (ref 3.5–5.2)
ALP SERPL-CCNC: 88 U/L (ref 40–130)
ALT SERPL-CCNC: 28 U/L (ref 5–41)
ANION GAP SERPL CALCULATED.3IONS-SCNC: 18 MMOL/L (ref 7–19)
AST SERPL-CCNC: 16 U/L (ref 5–40)
BASOPHILS # BLD: 0.1 K/UL (ref 0–0.2)
BASOPHILS NFR BLD: 0.7 % (ref 0–1)
BILIRUB SERPL-MCNC: 0.5 MG/DL (ref 0.2–1.2)
BUN SERPL-MCNC: 19 MG/DL (ref 6–20)
CALCIUM SERPL-MCNC: 9.1 MG/DL (ref 8.6–10)
CHLORIDE SERPL-SCNC: 101 MMOL/L (ref 98–111)
CO2 SERPL-SCNC: 23 MMOL/L (ref 22–29)
CREAT SERPL-MCNC: 0.9 MG/DL (ref 0.5–1.2)
EOSINOPHIL # BLD: 0.2 K/UL (ref 0–0.6)
EOSINOPHIL NFR BLD: 2.4 % (ref 0–5)
ERYTHROCYTE [DISTWIDTH] IN BLOOD BY AUTOMATED COUNT: 13.6 % (ref 11.5–14.5)
GLUCOSE SERPL-MCNC: 149 MG/DL (ref 74–109)
HBA1C MFR BLD: 7.9 % (ref 4–6)
HCT VFR BLD AUTO: 46.8 % (ref 42–52)
HGB BLD-MCNC: 14.9 G/DL (ref 14–18)
IMM GRANULOCYTES # BLD: 0.1 K/UL
LYMPHOCYTES # BLD: 1.9 K/UL (ref 1.1–4.5)
LYMPHOCYTES NFR BLD: 27.4 % (ref 20–40)
MCH RBC QN AUTO: 27.1 PG (ref 27–31)
MCHC RBC AUTO-ENTMCNC: 31.8 G/DL (ref 33–37)
MCV RBC AUTO: 85.2 FL (ref 80–94)
MONOCYTES # BLD: 0.5 K/UL (ref 0–0.9)
MONOCYTES NFR BLD: 6.4 % (ref 0–10)
NEUTROPHILS # BLD: 4.4 K/UL (ref 1.5–7.5)
NEUTS SEG NFR BLD: 62.3 % (ref 50–65)
PLATELET # BLD AUTO: 224 K/UL (ref 130–400)
PMV BLD AUTO: 8.9 FL (ref 9.4–12.4)
POTASSIUM SERPL-SCNC: 4.7 MMOL/L (ref 3.5–5)
PROT SERPL-MCNC: 7.1 G/DL (ref 6.6–8.7)
RBC # BLD AUTO: 5.49 M/UL (ref 4.7–6.1)
SODIUM SERPL-SCNC: 142 MMOL/L (ref 136–145)
T4 FREE SERPL-MCNC: 1.1 NG/DL (ref 0.93–1.7)
TSH SERPL DL<=0.005 MIU/L-ACNC: 2.35 UIU/ML (ref 0.27–4.2)
WBC # BLD AUTO: 7.1 K/UL (ref 4.8–10.8)

## 2024-06-14 PROCEDURE — 3078F DIAST BP <80 MM HG: CPT | Performed by: CLINICAL NURSE SPECIALIST

## 2024-06-14 PROCEDURE — 99214 OFFICE O/P EST MOD 30 MIN: CPT | Performed by: CLINICAL NURSE SPECIALIST

## 2024-06-14 PROCEDURE — 3074F SYST BP LT 130 MM HG: CPT | Performed by: CLINICAL NURSE SPECIALIST

## 2024-06-14 RX ORDER — GLYBURIDE 5 MG/1
5 TABLET ORAL
Qty: 30 TABLET | Refills: 5 | Status: SHIPPED | OUTPATIENT
Start: 2024-06-14 | End: 2024-06-14 | Stop reason: SDUPTHER

## 2024-06-14 RX ORDER — LEVOTHYROXINE SODIUM 0.1 MG/1
100 TABLET ORAL DAILY
Qty: 30 TABLET | Refills: 5 | Status: SHIPPED | OUTPATIENT
Start: 2024-06-14

## 2024-06-14 RX ORDER — GLYBURIDE 5 MG/1
5 TABLET ORAL 2 TIMES DAILY WITH MEALS
Qty: 60 TABLET | Refills: 5 | Status: SHIPPED | OUTPATIENT
Start: 2024-06-14

## 2024-06-14 RX ORDER — FUROSEMIDE 20 MG/1
20 TABLET ORAL DAILY PRN
Qty: 30 TABLET | Refills: 3 | Status: SHIPPED | OUTPATIENT
Start: 2024-06-14

## 2024-06-14 RX ORDER — SEMAGLUTIDE 0.68 MG/ML
INJECTION, SOLUTION SUBCUTANEOUS
Qty: 3 ML | Refills: 0 | Status: SHIPPED | OUTPATIENT
Start: 2024-06-14

## 2024-06-14 SDOH — ECONOMIC STABILITY: INCOME INSECURITY: HOW HARD IS IT FOR YOU TO PAY FOR THE VERY BASICS LIKE FOOD, HOUSING, MEDICAL CARE, AND HEATING?: NOT HARD AT ALL

## 2024-06-14 SDOH — ECONOMIC STABILITY: FOOD INSECURITY: WITHIN THE PAST 12 MONTHS, YOU WORRIED THAT YOUR FOOD WOULD RUN OUT BEFORE YOU GOT MONEY TO BUY MORE.: NEVER TRUE

## 2024-06-14 SDOH — ECONOMIC STABILITY: FOOD INSECURITY: WITHIN THE PAST 12 MONTHS, THE FOOD YOU BOUGHT JUST DIDN'T LAST AND YOU DIDN'T HAVE MONEY TO GET MORE.: NEVER TRUE

## 2024-06-14 ASSESSMENT — ENCOUNTER SYMPTOMS
EYE PAIN: 0
SINUS PRESSURE: 0
NAUSEA: 0
CHEST TIGHTNESS: 0
SHORTNESS OF BREATH: 0
EYE DISCHARGE: 0
TROUBLE SWALLOWING: 0
COUGH: 0
WHEEZING: 0
ABDOMINAL PAIN: 0
COLOR CHANGE: 0
DIARRHEA: 0
CONSTIPATION: 0
VOMITING: 0
BACK PAIN: 0
SORE THROAT: 0

## 2024-06-14 ASSESSMENT — PATIENT HEALTH QUESTIONNAIRE - PHQ9
SUM OF ALL RESPONSES TO PHQ9 QUESTIONS 1 & 2: 0
SUM OF ALL RESPONSES TO PHQ QUESTIONS 1-9: 0
2. FEELING DOWN, DEPRESSED OR HOPELESS: NOT AT ALL
SUM OF ALL RESPONSES TO PHQ QUESTIONS 1-9: 0
SUM OF ALL RESPONSES TO PHQ QUESTIONS 1-9: 0
1. LITTLE INTEREST OR PLEASURE IN DOING THINGS: NOT AT ALL
SUM OF ALL RESPONSES TO PHQ QUESTIONS 1-9: 0

## 2024-06-14 NOTE — TELEPHONE ENCOUNTER
Pt aware and voiced understanding. 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 Agreeable to do injections to help control levels and lose weight.

## 2024-06-14 NOTE — TELEPHONE ENCOUNTER
Per DOT guidelines it reads that A1c has to be maintained at 8 or less. 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  I will send the starting dose for ozempic and need to see him back in 4 weeks to recheck A1c.

## 2024-06-14 NOTE — PROGRESS NOTES
Negative for chest pain and palpitations.   Gastrointestinal:  Negative for abdominal pain, constipation, diarrhea, nausea and vomiting.   Genitourinary:  Negative for difficulty urinating, dysuria, flank pain, frequency, hematuria and urgency.   Musculoskeletal:  Negative for arthralgias, back pain, joint swelling and neck pain.   Skin:  Negative for color change and rash.   Neurological:  Negative for dizziness, syncope, weakness, light-headedness and headaches.   Hematological:  Negative for adenopathy.   Psychiatric/Behavioral:  Negative for behavioral problems, confusion and dysphoric mood. The patient is not nervous/anxious.         OBJECTIVE:  /78   Pulse 72   Temp 97.6 °F (36.4 °C) (Infrared)   Resp 17   Wt 127 kg (280 lb)   SpO2 98%   BMI 34.08 kg/m²    Physical Exam  Vitals reviewed.   Constitutional:       General: He is not in acute distress.     Appearance: He is well-developed. He is not ill-appearing or toxic-appearing.   HENT:      Head: Normocephalic and atraumatic.   Eyes:      General:         Right eye: No discharge.         Left eye: No discharge.      Conjunctiva/sclera: Conjunctivae normal.      Pupils: Pupils are equal, round, and reactive to light.   Neck:      Thyroid: No thyromegaly.      Trachea: No tracheal deviation.   Cardiovascular:      Rate and Rhythm: Normal rate and regular rhythm.      Heart sounds: No murmur heard.  Pulmonary:      Effort: Pulmonary effort is normal. No respiratory distress.      Breath sounds: Normal breath sounds. No wheezing or rales.   Genitourinary:     Penis: No tenderness.    Musculoskeletal:         General: No deformity. Normal range of motion.      Cervical back: Neck supple.   Skin:     General: Skin is warm and dry.      Findings: No erythema or rash.   Neurological:      General: No focal deficit present.      Mental Status: He is alert and oriented to person, place, and time.   Psychiatric:         Mood and Affect: Mood normal.

## 2024-06-14 NOTE — TELEPHONE ENCOUNTER
----- Message from DENIS Arriola sent at 6/14/2024  2:28 PM CDT -----  His labs look great other than his A1c continues to increase. It is now at 7.9  I would like him to increase glyburide to twice daily. Continue 4 metformin daily.  We can fax labs to [facility] Arenas [facility] again.  Please make follow up with me for 6 months

## 2024-07-09 ENCOUNTER — TELEPHONE (OUTPATIENT)
Dept: FAMILY MEDICINE CLINIC | Age: 54
End: 2024-07-09

## 2024-07-09 NOTE — TELEPHONE ENCOUNTER
Pt left  requesting a call back, upon callback he stated that he is having issues with his cpap. That he is waking up short of breath/out of breath. He has spoken with [facility] twice, the first time they told him they could turn his settings up which they did but he couldn't tell a difference, and the second time they stated that they could not turn it up again due to it being medication related and they would need to confer with you. Please advise.

## 2024-07-10 NOTE — TELEPHONE ENCOUNTER
Need to have him see either pulmonology, Dr Matos or neurology, Dr Estrella for management and possible adjustments.   I think we could get him in sooner at Dr Carmona office.  Let me know if agreeable.

## 2024-07-23 ENCOUNTER — OFFICE VISIT (OUTPATIENT)
Dept: FAMILY MEDICINE CLINIC | Age: 54
End: 2024-07-23
Payer: COMMERCIAL

## 2024-07-23 VITALS
HEART RATE: 72 BPM | TEMPERATURE: 97.3 F | BODY MASS INDEX: 34.15 KG/M2 | WEIGHT: 280.56 LBS | OXYGEN SATURATION: 96 % | SYSTOLIC BLOOD PRESSURE: 94 MMHG | DIASTOLIC BLOOD PRESSURE: 68 MMHG

## 2024-07-23 DIAGNOSIS — E11.9 TYPE 2 DIABETES MELLITUS WITHOUT COMPLICATION, WITHOUT LONG-TERM CURRENT USE OF INSULIN (HCC): Primary | ICD-10-CM

## 2024-07-23 DIAGNOSIS — G47.33 OBSTRUCTIVE SLEEP APNEA SYNDROME: ICD-10-CM

## 2024-07-23 LAB — HBA1C MFR BLD: 6.7 %

## 2024-07-23 PROCEDURE — 3044F HG A1C LEVEL LT 7.0%: CPT | Performed by: CLINICAL NURSE SPECIALIST

## 2024-07-23 PROCEDURE — 3078F DIAST BP <80 MM HG: CPT | Performed by: CLINICAL NURSE SPECIALIST

## 2024-07-23 PROCEDURE — 99213 OFFICE O/P EST LOW 20 MIN: CPT | Performed by: CLINICAL NURSE SPECIALIST

## 2024-07-23 PROCEDURE — 3074F SYST BP LT 130 MM HG: CPT | Performed by: CLINICAL NURSE SPECIALIST

## 2024-07-23 PROCEDURE — 83036 HEMOGLOBIN GLYCOSYLATED A1C: CPT | Performed by: CLINICAL NURSE SPECIALIST

## 2024-07-23 RX ORDER — SEMAGLUTIDE 0.68 MG/ML
INJECTION, SOLUTION SUBCUTANEOUS
Qty: 3 ML | Refills: 3 | Status: SHIPPED | OUTPATIENT
Start: 2024-07-23

## 2024-07-23 ASSESSMENT — ENCOUNTER SYMPTOMS
SHORTNESS OF BREATH: 0
CHEST TIGHTNESS: 0
BACK PAIN: 0
ABDOMINAL PAIN: 0
COLOR CHANGE: 0
SORE THROAT: 0
COUGH: 0
SINUS PRESSURE: 0
DIARRHEA: 0
NAUSEA: 0
TROUBLE SWALLOWING: 0
WHEEZING: 0

## 2024-07-23 NOTE — PROGRESS NOTES
rash.   Neurological:  Negative for dizziness, syncope, weakness, light-headedness and headaches.   Psychiatric/Behavioral:  Negative for behavioral problems, confusion and dysphoric mood. The patient is not nervous/anxious.         OBJECTIVE:  BP 94/68   Pulse 72   Temp 97.3 °F (36.3 °C)   Wt 127.3 kg (280 lb 9 oz)   SpO2 96%   BMI 34.15 kg/m²    Physical Exam  Vitals reviewed.   Constitutional:       General: He is not in acute distress.     Appearance: He is well-developed. He is not ill-appearing or toxic-appearing.   HENT:      Head: Normocephalic and atraumatic.   Eyes:      General:         Right eye: No discharge.         Left eye: No discharge.      Conjunctiva/sclera: Conjunctivae normal.      Pupils: Pupils are equal, round, and reactive to light.   Neck:      Thyroid: No thyromegaly.      Trachea: No tracheal deviation.   Cardiovascular:      Rate and Rhythm: Normal rate and regular rhythm.      Heart sounds: No murmur heard.  Pulmonary:      Effort: Pulmonary effort is normal. No respiratory distress.      Breath sounds: Normal breath sounds. No wheezing or rales.   Genitourinary:     Penis: No tenderness.    Musculoskeletal:         General: No deformity. Normal range of motion.      Cervical back: Neck supple.   Skin:     General: Skin is warm and dry.      Findings: No erythema or rash.   Neurological:      General: No focal deficit present.      Mental Status: He is alert and oriented to person, place, and time.   Psychiatric:         Mood and Affect: Mood normal.         Thought Content: Thought content normal.         Judgment: Judgment normal.         Hemoglobin A1C   Date Value Ref Range Status   07/23/2024 6.7 % Final       ASSESSMENT/PLAN:  1. Type 2 diabetes mellitus without complication, without long-term current use of insulin (HCC)  Improving  Reduce glyburide to once daily and then stop if ongoing hypo symptoms  Increase ozempic to 0.5mg weekly  Continue metformin  Plan A1c 3 months

## 2024-08-05 ENCOUNTER — TELEPHONE (OUTPATIENT)
Dept: FAMILY MEDICINE CLINIC | Age: 54
End: 2024-08-05

## 2024-08-05 NOTE — TELEPHONE ENCOUNTER
Patient called and is having problems with diverticulitis. He said you caused it because you asked if he had had any problems with it. He is not doubled over, but is having pain. I offered him appointment to come in but he is in [place] and cannot come in. Instructed to go to the ER if worsens. Please respond.

## 2024-08-06 RX ORDER — AMOXICILLIN AND CLAVULANATE POTASSIUM 875; 125 MG/1; MG/1
1 TABLET, FILM COATED ORAL 2 TIMES DAILY
Qty: 14 TABLET | Refills: 0 | Status: SHIPPED | OUTPATIENT
Start: 2024-08-06 | End: 2024-08-13

## 2024-08-06 NOTE — TELEPHONE ENCOUNTER
I sent augmentin to sydnie if he is still having pain when he returns home. If no pain, I would not start. Let me know if he gets worse or unimproved.

## 2024-08-06 NOTE — TELEPHONE ENCOUNTER
Other than pain is he having any other symptoms?  Fever or change in appetite or bowel movement?  If he is back home and symptoms are mild I can send treatment. Let me know

## 2024-08-06 NOTE — TELEPHONE ENCOUNTER
Patient notified of the antibiotic being called in. His wife will go get the medication and he will start tonight. Patient will call back if no better with medication.

## 2024-10-23 ENCOUNTER — OFFICE VISIT (OUTPATIENT)
Dept: FAMILY MEDICINE CLINIC | Age: 54
End: 2024-10-23
Payer: COMMERCIAL

## 2024-10-23 VITALS
DIASTOLIC BLOOD PRESSURE: 64 MMHG | TEMPERATURE: 98 F | HEART RATE: 72 BPM | HEIGHT: 76 IN | SYSTOLIC BLOOD PRESSURE: 106 MMHG | OXYGEN SATURATION: 97 % | BODY MASS INDEX: 33.73 KG/M2 | RESPIRATION RATE: 17 BRPM | WEIGHT: 277 LBS

## 2024-10-23 DIAGNOSIS — E03.9 ACQUIRED HYPOTHYROIDISM: ICD-10-CM

## 2024-10-23 DIAGNOSIS — E11.9 TYPE 2 DIABETES MELLITUS WITHOUT COMPLICATION, WITHOUT LONG-TERM CURRENT USE OF INSULIN (HCC): Primary | ICD-10-CM

## 2024-10-23 DIAGNOSIS — Z12.5 ENCOUNTER FOR SCREENING FOR MALIGNANT NEOPLASM OF PROSTATE: ICD-10-CM

## 2024-10-23 DIAGNOSIS — E11.9 TYPE 2 DIABETES MELLITUS WITHOUT COMPLICATION, WITHOUT LONG-TERM CURRENT USE OF INSULIN (HCC): ICD-10-CM

## 2024-10-23 PROBLEM — R91.8 GROUND GLASS OPACITY PRESENT ON IMAGING OF LUNG: Status: RESOLVED | Noted: 2021-03-30 | Resolved: 2024-10-23

## 2024-10-23 PROBLEM — G47.10 HYPERSOMNIA: Status: RESOLVED | Noted: 2021-03-30 | Resolved: 2024-10-23

## 2024-10-23 PROBLEM — D72.828 NEUTROPHILIA: Status: RESOLVED | Noted: 2022-09-05 | Resolved: 2024-10-23

## 2024-10-23 PROBLEM — D72.829 LEUKOCYTOSIS: Status: RESOLVED | Noted: 2022-09-05 | Resolved: 2024-10-23

## 2024-10-23 PROBLEM — G47.34 SLEEP RELATED HYPOXIA: Status: RESOLVED | Noted: 2021-04-27 | Resolved: 2024-10-23

## 2024-10-23 PROBLEM — R60.1 GENERALIZED EDEMA: Status: RESOLVED | Noted: 2021-04-21 | Resolved: 2024-10-23

## 2024-10-23 PROBLEM — A09: Status: RESOLVED | Noted: 2022-09-05 | Resolved: 2024-10-23

## 2024-10-23 PROBLEM — H81.13 BENIGN PAROXYSMAL POSITIONAL VERTIGO DUE TO BILATERAL VESTIBULAR DISORDER: Status: RESOLVED | Noted: 2022-05-18 | Resolved: 2024-10-23

## 2024-10-23 PROBLEM — B02.9 HERPES ZOSTER WITHOUT COMPLICATION: Status: RESOLVED | Noted: 2020-02-20 | Resolved: 2024-10-23

## 2024-10-23 PROBLEM — R06.02 SHORT OF BREATH ON EXERTION: Status: RESOLVED | Noted: 2021-03-30 | Resolved: 2024-10-23

## 2024-10-23 PROBLEM — G47.8 NON-RESTORATIVE SLEEP: Status: RESOLVED | Noted: 2021-03-30 | Resolved: 2024-10-23

## 2024-10-23 PROBLEM — K57.32 DIVERTICULITIS OF LARGE INTESTINE WITHOUT PERFORATION OR ABSCESS: Status: RESOLVED | Noted: 2018-03-15 | Resolved: 2024-10-23

## 2024-10-23 LAB
ALBUMIN SERPL-MCNC: 4.2 G/DL (ref 3.5–5.2)
ALP SERPL-CCNC: 72 U/L (ref 40–129)
ALT SERPL-CCNC: 25 U/L (ref 5–41)
ANION GAP SERPL CALCULATED.3IONS-SCNC: 12 MMOL/L (ref 7–19)
AST SERPL-CCNC: 19 U/L (ref 5–40)
BILIRUB SERPL-MCNC: 0.4 MG/DL (ref 0.2–1.2)
BUN SERPL-MCNC: 20 MG/DL (ref 6–20)
CALCIUM SERPL-MCNC: 8.8 MG/DL (ref 8.6–10)
CHLORIDE SERPL-SCNC: 101 MMOL/L (ref 98–111)
CHOLEST SERPL-MCNC: 182 MG/DL (ref 0–199)
CO2 SERPL-SCNC: 26 MMOL/L (ref 22–29)
CREAT SERPL-MCNC: 0.8 MG/DL (ref 0.7–1.2)
CREAT UR-MCNC: 222.2 MG/DL (ref 39–259)
GLUCOSE SERPL-MCNC: 109 MG/DL (ref 70–99)
HBA1C MFR BLD: 6.4 % (ref 4–5.6)
HDLC SERPL-MCNC: 43 MG/DL (ref 40–60)
LDLC SERPL CALC-MCNC: 115 MG/DL
MICROALBUMIN UR-MCNC: <1.2 MG/DL (ref 0–1.99)
MICROALBUMIN/CREAT UR-RTO: NORMAL MG/G
POTASSIUM SERPL-SCNC: 4.3 MMOL/L (ref 3.5–5)
PROT SERPL-MCNC: 7 G/DL (ref 6.4–8.3)
PSA SERPL-MCNC: 0.83 NG/ML (ref 0–4)
SODIUM SERPL-SCNC: 139 MMOL/L (ref 136–145)
TRIGL SERPL-MCNC: 118 MG/DL (ref 0–149)

## 2024-10-23 PROCEDURE — 3074F SYST BP LT 130 MM HG: CPT | Performed by: CLINICAL NURSE SPECIALIST

## 2024-10-23 PROCEDURE — 99214 OFFICE O/P EST MOD 30 MIN: CPT | Performed by: CLINICAL NURSE SPECIALIST

## 2024-10-23 PROCEDURE — 3078F DIAST BP <80 MM HG: CPT | Performed by: CLINICAL NURSE SPECIALIST

## 2024-10-23 PROCEDURE — 3044F HG A1C LEVEL LT 7.0%: CPT | Performed by: CLINICAL NURSE SPECIALIST

## 2024-10-23 RX ORDER — GLYBURIDE 5 MG/1
5 TABLET ORAL
Qty: 30 TABLET | Refills: 5
Start: 2024-10-23

## 2024-10-23 SDOH — ECONOMIC STABILITY: FOOD INSECURITY: WITHIN THE PAST 12 MONTHS, THE FOOD YOU BOUGHT JUST DIDN'T LAST AND YOU DIDN'T HAVE MONEY TO GET MORE.: NEVER TRUE

## 2024-10-23 SDOH — ECONOMIC STABILITY: FOOD INSECURITY: WITHIN THE PAST 12 MONTHS, YOU WORRIED THAT YOUR FOOD WOULD RUN OUT BEFORE YOU GOT MONEY TO BUY MORE.: NEVER TRUE

## 2024-10-23 SDOH — ECONOMIC STABILITY: INCOME INSECURITY: HOW HARD IS IT FOR YOU TO PAY FOR THE VERY BASICS LIKE FOOD, HOUSING, MEDICAL CARE, AND HEATING?: NOT HARD AT ALL

## 2024-10-23 ASSESSMENT — ENCOUNTER SYMPTOMS
COLOR CHANGE: 0
EYE PAIN: 0
VOMITING: 0
CHEST TIGHTNESS: 0
ABDOMINAL PAIN: 0
SINUS PRESSURE: 0
SORE THROAT: 0
EYE DISCHARGE: 0
TROUBLE SWALLOWING: 0
BACK PAIN: 0
CONSTIPATION: 0
COUGH: 0
WHEEZING: 0
DIARRHEA: 0
SHORTNESS OF BREATH: 0
NAUSEA: 0

## 2024-10-23 ASSESSMENT — PATIENT HEALTH QUESTIONNAIRE - PHQ9
SUM OF ALL RESPONSES TO PHQ QUESTIONS 1-9: 0
1. LITTLE INTEREST OR PLEASURE IN DOING THINGS: NOT AT ALL
SUM OF ALL RESPONSES TO PHQ9 QUESTIONS 1 & 2: 0
2. FEELING DOWN, DEPRESSED OR HOPELESS: NOT AT ALL
SUM OF ALL RESPONSES TO PHQ QUESTIONS 1-9: 0

## 2024-10-23 NOTE — PROGRESS NOTES
SUBJECTIVE:  Arden Walker Jr. is a 54 y.o. who presents today for Follow-up      HPI    Augustin presents today for routine follow up.     Type 2 DM without complication, improved.   Doing well on ozempic 0.5mg SQ weekly. No side effects.  Reduced glyburide to once daily after last visits. No hypoglycemia known since then.  Remains on metformin as well.   Weight remains stable.  Pending CDL renewal.   Eye exam due first of next year.     Hypothyroidism, seems stable. No overt symptoms. Remains on levothyroxine, same dose for years.   TFT WNL last labs.    Past Medical History:   Diagnosis Date    Acid reflux     Adenomatous goiter     2013 left thyroidectomy  Dr Loving    Arthralgia of multiple sites     Arthritis     osteo    Chronic back pain     COVID-19     Diverticulitis of large intestine without perforation or abscess 03/15/2018    Effusion of patella     Gastric ulcer     Knee pain     Medial meniscus tear     Mixed hyperlipidemia     Obesity     Pain of right hand     Postoperative hypothyroidism     left lobectomy, Dr Loving     Prediabetes     Prolonged emergence from general anesthesia     fights upon emergence; works in [employer], woke feeling he was at work and being threatened.    Septic enteritis 09/05/2022    Simple goiter     Solitary thyroid nodule     Tear of medial meniscus of left knee 05/12/2016    Thyroid disease      Past Surgical History:   Procedure Laterality Date    CHOLECYSTECTOMY, LAPAROSCOPIC      HERNIA REPAIR      umbilical    INGUINAL HERNIA REPAIR Left     age 7    KNEE ARTHROSCOPY Right     x2    KNEE CARTILAGE SURGERY Left 05/12/2016    ARTHROSCOPY PARTIAL MEDIAL MENISCECTOMY KNEE performed by Kingsley Stephenson DO at [facility] OR    MOUTH SURGERY      wisdom teeth removed    THYROIDECTOMY, PARTIAL      Left thyroidectomy, Dr. Loving, 2013    VENTRAL HERNIA REPAIR       Family History   Problem Relation Age of Onset    Other Mother         hypothyroidism    Heart Disease Mother

## 2025-01-03 DIAGNOSIS — E11.9 TYPE 2 DIABETES MELLITUS WITHOUT COMPLICATION, WITHOUT LONG-TERM CURRENT USE OF INSULIN (HCC): ICD-10-CM

## 2025-01-03 RX ORDER — SEMAGLUTIDE 0.68 MG/ML
INJECTION, SOLUTION SUBCUTANEOUS
Qty: 3 ML | Refills: 3 | Status: SHIPPED | OUTPATIENT
Start: 2025-01-03

## 2025-04-01 ENCOUNTER — OFFICE VISIT (OUTPATIENT)
Age: 55
End: 2025-04-01
Payer: COMMERCIAL

## 2025-04-01 ENCOUNTER — HOSPITAL ENCOUNTER (OUTPATIENT)
Dept: GENERAL RADIOLOGY | Age: 55
Discharge: HOME | End: 2025-04-01
Payer: COMMERCIAL

## 2025-04-01 VITALS
BODY MASS INDEX: 33.61 KG/M2 | HEART RATE: 61 BPM | HEIGHT: 76 IN | TEMPERATURE: 97.8 F | RESPIRATION RATE: 17 BRPM | SYSTOLIC BLOOD PRESSURE: 104 MMHG | OXYGEN SATURATION: 99 % | WEIGHT: 276 LBS | DIASTOLIC BLOOD PRESSURE: 74 MMHG

## 2025-04-01 DIAGNOSIS — M79.662 PAIN AND SWELLING OF LEFT LOWER LEG: ICD-10-CM

## 2025-04-01 DIAGNOSIS — M79.89 PAIN AND SWELLING OF LEFT LOWER LEG: ICD-10-CM

## 2025-04-01 DIAGNOSIS — I82.812 ACUTE SUPERFICIAL VENOUS THROMBOSIS OF LEFT LOWER EXTREMITY: Primary | ICD-10-CM

## 2025-04-01 DIAGNOSIS — S70.12XA TRAUMATIC HEMATOMA OF LEFT THIGH, INITIAL ENCOUNTER: ICD-10-CM

## 2025-04-01 DIAGNOSIS — I80.02 PHLEBITIS AND THROMBOPHLEBITIS OF SUPERFICIAL VESSELS OF LEFT LOWER EXTREMITY: ICD-10-CM

## 2025-04-01 LAB — ECHO BSA: 2.59 M2

## 2025-04-01 PROCEDURE — 3078F DIAST BP <80 MM HG: CPT | Performed by: CLINICAL NURSE SPECIALIST

## 2025-04-01 PROCEDURE — 3074F SYST BP LT 130 MM HG: CPT | Performed by: CLINICAL NURSE SPECIALIST

## 2025-04-01 PROCEDURE — 99214 OFFICE O/P EST MOD 30 MIN: CPT | Performed by: CLINICAL NURSE SPECIALIST

## 2025-04-01 PROCEDURE — 93971 EXTREMITY STUDY: CPT

## 2025-04-01 RX ORDER — CEPHALEXIN 500 MG/1
500 CAPSULE ORAL 3 TIMES DAILY
Qty: 21 CAPSULE | Refills: 0 | Status: SHIPPED | OUTPATIENT
Start: 2025-04-01 | End: 2025-04-08

## 2025-04-01 RX ORDER — RIVAROXABAN 10 MG/1
10 TABLET, FILM COATED ORAL
Qty: 30 TABLET | Refills: 1 | Status: SHIPPED | OUTPATIENT
Start: 2025-04-01

## 2025-04-01 SDOH — ECONOMIC STABILITY: FOOD INSECURITY: WITHIN THE PAST 12 MONTHS, YOU WORRIED THAT YOUR FOOD WOULD RUN OUT BEFORE YOU GOT MONEY TO BUY MORE.: NEVER TRUE

## 2025-04-01 SDOH — ECONOMIC STABILITY: FOOD INSECURITY: WITHIN THE PAST 12 MONTHS, THE FOOD YOU BOUGHT JUST DIDN'T LAST AND YOU DIDN'T HAVE MONEY TO GET MORE.: NEVER TRUE

## 2025-04-01 ASSESSMENT — PATIENT HEALTH QUESTIONNAIRE - PHQ9
SUM OF ALL RESPONSES TO PHQ QUESTIONS 1-9: 0
SUM OF ALL RESPONSES TO PHQ QUESTIONS 1-9: 0
1. LITTLE INTEREST OR PLEASURE IN DOING THINGS: NOT AT ALL
2. FEELING DOWN, DEPRESSED OR HOPELESS: NOT AT ALL
SUM OF ALL RESPONSES TO PHQ QUESTIONS 1-9: 0
SUM OF ALL RESPONSES TO PHQ QUESTIONS 1-9: 0

## 2025-04-01 NOTE — PROGRESS NOTES
SUBJECTIVE:  Arden Walker Jr. is a 54 y.o. who presents today for Knee Injury      HPI    Skyler presents today for an acute visit.     On Sunday, his 4 year old granddaughter applied pressure to his left inner thigh with her knee causing the skin to get pinched between her knee and his recliner. This was very brief.  Since then there has been progressive pain, swelling, redness and bruising to the site.   He reports an area of abscess that he opened and drained serous fluid from.   He has taken ibuprofen and tylenol without relief.  Pain is worse with touch and use of LLE.  No history of DVT or SVT.    Past Medical History:   Diagnosis Date    Acid reflux     Adenomatous goiter     2013 left thyroidectomy  Dr Loving    Arthralgia of multiple sites     Arthritis     osteo    Chronic back pain     COVID-19     Diverticulitis of large intestine without perforation or abscess 03/15/2018    Effusion of patella     Gastric ulcer     Knee pain     Medial meniscus tear     Mixed hyperlipidemia     Obesity     Pain of right hand     Postoperative hypothyroidism     left lobectomy, Dr Loving     Prediabetes     Prolonged emergence from general anesthesia     fights upon emergence; works in [employer], woke feeling he was at work and being threatened.    Septic enteritis 09/05/2022    Simple goiter     Solitary thyroid nodule     Tear of medial meniscus of left knee 05/12/2016    Thyroid disease      Past Surgical History:   Procedure Laterality Date    CHOLECYSTECTOMY, LAPAROSCOPIC      HERNIA REPAIR      umbilical    INGUINAL HERNIA REPAIR Left     age 7    KNEE ARTHROSCOPY Right     x2    KNEE CARTILAGE SURGERY Left 05/12/2016    ARTHROSCOPY PARTIAL MEDIAL MENISCECTOMY KNEE performed by Kingsley Stephenson DO at [facility] OR    MOUTH SURGERY      wisdom teeth removed    THYROIDECTOMY, PARTIAL      Left thyroidectomy, Dr. Loivng, 2013    VENTRAL HERNIA REPAIR       Family History   Problem Relation Age of Onset    Other Mother

## 2025-04-02 ENCOUNTER — RESULTS FOLLOW-UP (OUTPATIENT)
Age: 55
End: 2025-04-02

## 2025-04-02 ASSESSMENT — ENCOUNTER SYMPTOMS
SORE THROAT: 0
COUGH: 0
RHINORRHEA: 0
WHEEZING: 0
VOMITING: 0
SINUS PRESSURE: 0
SHORTNESS OF BREATH: 0
NAUSEA: 0
FACIAL SWELLING: 0
CHEST TIGHTNESS: 0

## 2025-04-04 ENCOUNTER — TELEPHONE (OUTPATIENT)
Age: 55
End: 2025-04-04

## 2025-04-04 DIAGNOSIS — M79.662 PAIN AND SWELLING OF LEFT LOWER LEG: ICD-10-CM

## 2025-04-04 DIAGNOSIS — I82.812 ACUTE SUPERFICIAL VENOUS THROMBOSIS OF LEFT LOWER EXTREMITY: Primary | ICD-10-CM

## 2025-04-04 DIAGNOSIS — M79.89 PAIN AND SWELLING OF LEFT LOWER LEG: ICD-10-CM

## 2025-04-04 RX ORDER — TRAMADOL HYDROCHLORIDE 50 MG/1
50 TABLET ORAL EVERY 6 HOURS PRN
Qty: 12 TABLET | Refills: 0 | Status: SHIPPED | OUTPATIENT
Start: 2025-04-04 | End: 2025-04-07

## 2025-04-04 NOTE — TELEPHONE ENCOUNTER
Tramadol sent. Monitor for sleepiness, drowsiness with use, nausea, constipation, rash- common side effects.  May continue tylenol with this.   Continue moist heat.

## 2025-04-04 NOTE — TELEPHONE ENCOUNTER
Pt left VM wanting to know how long he should expect to have pain because he forgot to ask. States that his leg is currently still extremely painful and \"feels like he has shingles\"

## 2025-04-04 NOTE — TELEPHONE ENCOUNTER
As long as not worsening pain, he could have some level of pain until the clot resolves.     Does he need something more than tylenol for pain? I can send a mild pain medication to use over the weekend. Just would not want to take and work next week.

## 2025-04-07 ENCOUNTER — OFFICE VISIT (OUTPATIENT)
Age: 55
End: 2025-04-07
Payer: COMMERCIAL

## 2025-04-07 VITALS
WEIGHT: 279 LBS | HEART RATE: 83 BPM | TEMPERATURE: 97.3 F | OXYGEN SATURATION: 97 % | DIASTOLIC BLOOD PRESSURE: 62 MMHG | HEIGHT: 76 IN | SYSTOLIC BLOOD PRESSURE: 104 MMHG | BODY MASS INDEX: 33.97 KG/M2

## 2025-04-07 DIAGNOSIS — R23.8 DUSKY DISCOLORATION OF SKIN: ICD-10-CM

## 2025-04-07 DIAGNOSIS — S81.802D WOUND OF LEFT LOWER EXTREMITY, SUBSEQUENT ENCOUNTER: Primary | ICD-10-CM

## 2025-04-07 DIAGNOSIS — S81.802D WOUND OF LEFT LOWER EXTREMITY, SUBSEQUENT ENCOUNTER: ICD-10-CM

## 2025-04-07 DIAGNOSIS — T63.301D SPIDER BITE WOUND, ACCIDENTAL OR UNINTENTIONAL, SUBSEQUENT ENCOUNTER: Primary | ICD-10-CM

## 2025-04-07 DIAGNOSIS — I82.812 ACUTE SUPERFICIAL VENOUS THROMBOSIS OF LEFT LOWER EXTREMITY: ICD-10-CM

## 2025-04-07 PROCEDURE — 99214 OFFICE O/P EST MOD 30 MIN: CPT | Performed by: FAMILY MEDICINE

## 2025-04-07 PROCEDURE — 3078F DIAST BP <80 MM HG: CPT | Performed by: FAMILY MEDICINE

## 2025-04-07 PROCEDURE — 3074F SYST BP LT 130 MM HG: CPT | Performed by: FAMILY MEDICINE

## 2025-04-07 RX ORDER — DAPSONE 100 MG/1
100 TABLET ORAL DAILY
Qty: 7 TABLET | Refills: 0 | Status: SHIPPED | OUTPATIENT
Start: 2025-04-07 | End: 2025-04-14

## 2025-04-07 RX ORDER — SULFAMETHOXAZOLE AND TRIMETHOPRIM 800; 160 MG/1; MG/1
1 TABLET ORAL 2 TIMES DAILY
Qty: 20 TABLET | Refills: 0 | Status: SHIPPED | OUTPATIENT
Start: 2025-04-07 | End: 2025-04-17

## 2025-04-07 ASSESSMENT — ENCOUNTER SYMPTOMS
NAUSEA: 0
BACK PAIN: 0
RHINORRHEA: 0
VOMITING: 0
DIARRHEA: 0
COUGH: 0
CONSTIPATION: 0
SHORTNESS OF BREATH: 0
ABDOMINAL PAIN: 0

## 2025-04-07 NOTE — PROGRESS NOTES
Arden Walker Mauro (:  1970) is a 54 y.o. male, Established patient, here for evaluation of the following chief complaint(s):  svt (States is getting worse. Seen by claudia for it. More pain and is showing more.)         Assessment & Plan      Results  Imaging   - Ultrasound of the leg: Superficial clot in the leg  1. Spider bite wound, accidental or unintentional, subsequent encounter  -     sulfamethoxazole-trimethoprim (BACTRIM DS;SEPTRA DS) 800-160 MG per tablet; Take 1 tablet by mouth 2 times daily for 10 days, Disp-20 tablet, R-0Normal  -     dapsone 100 MG tablet; Take 1 tablet by mouth daily for 7 days, Disp-7 tablet, R-0Normal  2. Wound of left lower extremity, subsequent encounter  -     [facility]  3. Dusky discoloration of skin  4. Acute superficial venous thrombosis of left lower extremity    After examining the area is possible that he could have a superficial venous thrombosis thrombophlebitis resulting in overlying tissue necrosis however based on the appearance of the wound and area I have greater concerns for the possibility of a spider bite with surrounding necrotic tissue and erythema.  Discussed some uncertainty with the history and physical exam findings and the health situation but due to the appearance of the area on his left inner thigh discussed the recommendation for involving a surgeon for determination on further management needs moving forward as there is possible need for debridement.  Discussed going ahead and getting an antibiotic and treating aspects of it like a brown recluse spider bite and discussed that once they see the surgeon they may adjust or change what were doing.  Discussed proper care of involved area and if we are unable to get patient into a surgeon in a timely fashion then discussed that he may need to present to the ER for further evaluation and management.  Currently attempting to get general surgery's office on the phone for purpose

## 2025-04-08 ENCOUNTER — TELEPHONE (OUTPATIENT)
Dept: SURGERY | Facility: CLINIC | Age: 55
End: 2025-04-08
Payer: COMMERCIAL

## 2025-04-08 ENCOUNTER — OFFICE VISIT (OUTPATIENT)
Dept: SURGERY | Facility: CLINIC | Age: 55
End: 2025-04-08
Payer: COMMERCIAL

## 2025-04-08 ENCOUNTER — HOSPITAL ENCOUNTER (OUTPATIENT)
Dept: CT IMAGING | Facility: HOSPITAL | Age: 55
Discharge: HOME | End: 2025-04-08
Admitting: STUDENT IN AN ORGANIZED HEALTH CARE EDUCATION/TRAINING PROGRAM
Payer: COMMERCIAL

## 2025-04-08 ENCOUNTER — PATIENT ROUNDING (BHMG ONLY) (OUTPATIENT)
Dept: SURGERY | Facility: CLINIC | Age: 55
End: 2025-04-08
Payer: COMMERCIAL

## 2025-04-08 VITALS
WEIGHT: 260 LBS | SYSTOLIC BLOOD PRESSURE: 108 MMHG | HEIGHT: 76 IN | BODY MASS INDEX: 31.66 KG/M2 | DIASTOLIC BLOOD PRESSURE: 62 MMHG

## 2025-04-08 DIAGNOSIS — S81.802A WOUND OF LEFT LOWER EXTREMITY, INITIAL ENCOUNTER: Primary | ICD-10-CM

## 2025-04-08 DIAGNOSIS — S81.802A WOUND OF LEFT LOWER EXTREMITY, INITIAL ENCOUNTER: ICD-10-CM

## 2025-04-08 LAB — CREAT BLDA-MCNC: 1.1 MG/DL (ref 0.6–1.3)

## 2025-04-08 PROCEDURE — 73702 CT LWR EXTREMITY W/O&W/DYE: CPT

## 2025-04-08 PROCEDURE — 25510000001 IOPAMIDOL 61 % SOLUTION: Performed by: STUDENT IN AN ORGANIZED HEALTH CARE EDUCATION/TRAINING PROGRAM

## 2025-04-08 PROCEDURE — 82565 ASSAY OF CREATININE: CPT

## 2025-04-08 RX ORDER — IOPAMIDOL 612 MG/ML
100 INJECTION, SOLUTION INTRAVASCULAR
Status: COMPLETED | OUTPATIENT
Start: 2025-04-08 | End: 2025-04-08

## 2025-04-08 RX ORDER — DAPSONE 100 MG/1
1 TABLET ORAL DAILY
COMMUNITY
Start: 2025-04-07 | End: 2025-04-15

## 2025-04-08 RX ORDER — SULFAMETHOXAZOLE AND TRIMETHOPRIM 800; 160 MG/1; MG/1
1 TABLET ORAL
COMMUNITY
Start: 2025-04-07 | End: 2025-04-18

## 2025-04-08 RX ORDER — TRAMADOL HYDROCHLORIDE 50 MG/1
50 TABLET ORAL
COMMUNITY
Start: 2025-04-04 | End: 2025-04-08

## 2025-04-08 RX ORDER — SEMAGLUTIDE 0.68 MG/ML
0.5 INJECTION, SOLUTION SUBCUTANEOUS WEEKLY
COMMUNITY
Start: 2025-01-03

## 2025-04-08 RX ORDER — RIVAROXABAN 10 MG/1
10 TABLET, FILM COATED ORAL
COMMUNITY
Start: 2025-04-01

## 2025-04-08 RX ADMIN — IOPAMIDOL 100 ML: 612 INJECTION, SOLUTION INTRAVENOUS at 16:35

## 2025-04-08 NOTE — TELEPHONE ENCOUNTER
Called patient to let him know CT of left lower extremity scheduled for today at 3:00 PM at the [facility]. Instructed patient to drink plenty of fluids prior to arrival. Pt understood.

## 2025-04-10 PROBLEM — S81.802A WOUND OF LEFT LEG: Status: ACTIVE | Noted: 2025-04-10

## 2025-04-11 NOTE — PROGRESS NOTES
[facility] History and Physical  Rolando Andrade Jr.  MRN: [id number]  Age: 54 y.o. male     YOB: 1970    Primary   Problem      Skin/soft tissue lesion left thigh    SUBJECTIVE  History  of Presenting Illness   Patient is a 54 y.o. male with pertinent past medical history of goiter, HLD, HTN, hypothyroidism, sleep apnea, presenting for evaluation of skin/soft tissue lesion of the left medial thigh.  Patient reports that he was kneed by his granddaughter several weeks ago, and that subsequently had a knot develop underneath the area where she needed him, which subsequently worsened and he developed the overlying purple skin changes.  Was seen by ELGIN Mccarthy, who prescribed anticoagulation and antibiotics over concerns for thrombophlebitis, given that the patient had some superficial vein thrombosis seen on duplex study.  Was subsequently seen by Dr. Damon modi, who was concerned about potential spider bite, and subsequently referred him to my office for consideration of debridement.    Since starting anticoagulation and antibiotics, patient reports that surrounding erythema has improved, though pain persist, and the overlying skin changes have progressed to now approximately 3 x 3 cm area of skin changes, darkening, and some epidermal necrosis.  The leg in this area is somewhat more firm to palpation and continues to be tender.    Past Medical History     Past Medical History:  Past Medical History:   Diagnosis Date    Acute pansinusitis 5/19/2017    Goiter     Headache     Hyperlipidemia     Hypertension     Hypothyroidism     Sleep apnea     uses O2 at night       PCP: Adriana Valenzuela APRN    Past Surgical History:  Past Surgical History:   Procedure Laterality Date    CHOLECYSTECTOMY      HERNIA REPAIR      KNEE SURGERY      THYROIDECTOMY Left        Family History:  Family History   Problem Relation Age of Onset    Diabetes Mother     Arrhythmia  "Mother     Heart disease Father     Heart attack Father     Cancer Maternal Aunt     Cancer Maternal Uncle     Cancer Paternal Aunt     Multiple sclerosis Sister     Hypertension Brother        Social History:  Social History     Tobacco Use    Smoking status: Never    Smokeless tobacco: Never   Substance Use Topics    Alcohol use: Not Currently       Allergies:  No Known Allergies    Medications:  Current Outpatient Medications   Medication Instructions    albuterol (PROVENTIL) 2.5 mg, Nebulization, Every 4 Hours PRN    benzonatate (TESSALON) 200 mg, 3 Times Daily PRN    chlorproMAZINE (THORAZINE) 50 mg, Nightly    clotrimazole-betamethasone (LOTRISONE) 1-0.05 % cream As Needed    dapsone 100 MG tablet 1 tablet, Daily    glyburide (DIABETA) 5 mg, Daily With Breakfast    hydroCHLOROthiazide 25 mg, Every Morning    HYDROcodone-acetaminophen (NORCO) 7.5-325 MG per tablet Every 6 Hours    levothyroxine (SYNTHROID, LEVOTHROID) 100 MCG tablet TAKE ONE TABLET EVERY DAY    levothyroxine (SYNTHROID, LEVOTHROID) 100 MCG tablet 1 tablet, Daily    metFORMIN (GLUCOPHAGE) 500 mg, 2 Times Daily With Meals    metoclopramide (REGLAN) 10 mg, Oral, 3 Times Daily PRN    ondansetron (ZOFRAN) 4 mg, Every 8 Hours PRN    ondansetron ODT (ZOFRAN-ODT) 4 mg, Translingual, Every 4 Hours PRN    Ozempic (0.25 or 0.5 MG/DOSE) 0.5 mg, Weekly    predniSONE (DELTASONE) 20 mg, Oral, 2 Times Daily    sulfamethoxazole-trimethoprim (BACTRIM DS,SEPTRA DS) 800-160 MG per tablet 1 tablet    Xarelto 10 mg           Review of Systems   Per HPI.    Physical Examination     Vitals:    04/08/25 1102   BP: 108/62   Weight: 118 kg (260 lb)   Height: 193 cm (76\")       Estimated body mass index is 31.65 kg/m² as calculated from the following:    Height as of this encounter: 193 cm (76\").    Weight as of this encounter: 118 kg (260 lb).  PREVIOUS WEIGHTS:   Wt Readings from Last 5 Encounters:   04/08/25 118 kg (260 lb)   07/12/22 131 kg (289 lb)   04/12/22 126 kg " (278 lb)   09/13/21 136 kg (300 lb)   02/20/21 132 kg (290 lb)       Physical Examination:  Gen: awake, alert, sitting up in chair in no acute distress  HEENT: NCAT, EOMI, anicteric sclerae  CV: RRR, normotensive  Resp: nonlabored on room air, sats appropriate  MSK: Left thigh is somewhat more swollen than right, immediately has area of erythema approximately 10 cm by tenderness millimeters, and in the center of this there is a approximately 3 x 3 cm area of darkening and epidermal necrosis.  There is no fluctuance, or associated abscess cavity, there is no drainage coming from the wound; moves all four, no c/c/e  Neuro: no focal deficits, cranial nerves grossly intact  Psy:  appropriate, cooperative      Data Review     Labs:  CBC  Lab Results   Component Value Date    WBC 7.1 06/14/2024    HGB 14.9 06/14/2024    HCT 46.8 06/14/2024     06/14/2024      BMP  Lab Results   Component Value Date     10/23/2024    K 4.3 10/23/2024     10/23/2024    CO2 26 10/23/2024    BUN 20 10/23/2024    CREATININE 1.10 04/08/2025    GLUCOSE 109 (H) 10/23/2024    CALCIUM 8.8 10/23/2024    MG 1.9 04/12/2022      LFTs  Lab Results   Component Value Date    PROTEINTOT 7 10/23/2024    ALBUMIN 4.2 10/23/2024    BILITOT 0.4 10/23/2024    ALT 25 10/23/2024    AST 19 10/23/2024    ALKPHOS 72 10/23/2024      Coag  Lab Results   Component Value Date    PROTIME 13.3 02/20/2021    PTT 32.9 02/20/2021    INR 1.05 02/20/2021      Cardiac markers  Lab Results   Component Value Date    TROPONINT <0.010 02/20/2021      Urine:  UA  Lab Results   Component Value Date    COLORU YELLOW 07/17/2023    CLARITYU Clear 07/17/2023    SPECGRAVUR >=1.045 07/17/2023    PHUR <=5.0 03/09/2018    PROTEINUA Negative 03/09/2018    GLUCOSEU =>1000 07/17/2023    KETONESU Trace (A) 03/09/2018    BILIRUBINUR Negative 07/17/2023    BLOODU Negative 07/17/2023    NITRITEU Negative 07/17/2023    LEUKOCYTESUR Negative 07/17/2023    UROBILINOGEN 0.2 07/17/2023         Radiology Impressions:  CT Lower Extremity Left With & Without Contrast  Result Date: 4/9/2025  1. There is skin thickening and mild induration of the subcutaneous tissues involving the medial thigh and posteromedial thigh between the areas of fiduciary markers suggesting a cellulitis. There is no soft tissue gas, discrete mass or fluid collection demonstrated. No involvement of the underlying musculature. There are several varicosities noted within the subcutaneous tissues of the medial thigh. 2. Normal muscle mass of the thigh without definite denervation or evidence of intra muscular hemorrhage. No acute or suspicious bony abnormalities present. 3. Mild arthritic change of the medial joint compartment of the knee. 4. There is atherosclerotic calcification of the superficial femoral and popliteal artery without evidence of aneurysm.  This report was signed and finalized on 4/9/2025 8:37 AM by Dr. Jonathan Lopez MD.      Vascular duplex lower extremity venous left  Result Date: 4/1/2025  1. No left lower extremity DVT. 2. Superficial venous thrombus in the greater saphenous vein is noted. ______________________________________ Electronically signed by: ABEL OG M.D. Date:     04/01/2025 Time:    14:18          Problem List     Patient Active Problem List   Diagnosis    Hypothyroidism    Acute pansinusitis    Diverticulitis of colon without hemorrhage    VICENTE (dyspnea on exertion)    Essential hypertension    Precordial pain    Wound of left leg            Assessment/Plan   Rolando is a 54 y.o. male with concerns for thrombophlebitis versus spider bite manifested by left thigh swelling, erythema, and focal wound with some epidermal necrosis.  I am uncertain of the etiology, but in the absence of definitive abscess, would not recommend debridement, or incision in the field.  Furthermore, with concerns of superficial vein thrombosis and possibility of thrombophlebitis, as well as the patient's recent  starting of anticoagulation, he is at high risk for bleeding from any surgical intervention.    Recommend CT scan of the lower extremity, to rule out any concerns of abscess or necrotizing soft tissue infection, recommend ongoing anticoagulation and antibiotics as previously prescribed.      Update: CT obtained, no evidence of abscess, no evidence of NSTI.  Recommended patient follow-up with me in the near future, for wound check.      Smoking cessation: Never smoker-no counseling indicated  BMI: 31.7-appropriate nutrition and exercise recommendations offered  Med rec complete: yes  VTE: VTE PPX is not indicated.    Time Spent: I spent 30 minutes caring for Rolando Andrade Jr. on this date of service. This time includes time, independent of any procedures, spent by me in the following activities: preparing for the clinic visit, reviewing tests, obtaining and/or reviewing a separately obtained history, performing a medically appropriate examination and/or evaluation, counseling and educating the patient/family/caregiver, ordering medications, tests, or procedures, and documenting information in the medical record.     Fritz Ocasio MD  4/10/2025   19:37 CDT    Patient or patient representative verbalized consent for the use of Ambient Listening during the visit with  Fritz Ocasio MD for chart documentation. 4/10/2025  19:40 CDT

## 2025-04-23 ENCOUNTER — RESULTS FOLLOW-UP (OUTPATIENT)
Age: 55
End: 2025-04-23

## 2025-04-23 ENCOUNTER — OFFICE VISIT (OUTPATIENT)
Age: 55
End: 2025-04-23
Payer: COMMERCIAL

## 2025-04-23 VITALS
WEIGHT: 276 LBS | SYSTOLIC BLOOD PRESSURE: 122 MMHG | RESPIRATION RATE: 17 BRPM | HEART RATE: 69 BPM | DIASTOLIC BLOOD PRESSURE: 80 MMHG | BODY MASS INDEX: 33.6 KG/M2 | TEMPERATURE: 96.3 F

## 2025-04-23 DIAGNOSIS — I82.812 ACUTE SUPERFICIAL VENOUS THROMBOSIS OF LEFT LOWER EXTREMITY: ICD-10-CM

## 2025-04-23 DIAGNOSIS — Z00.00 ENCOUNTER FOR WELL ADULT EXAM WITHOUT ABNORMAL FINDINGS: Primary | ICD-10-CM

## 2025-04-23 DIAGNOSIS — S71.102D OPEN WOUND OF LEFT THIGH, SUBSEQUENT ENCOUNTER: ICD-10-CM

## 2025-04-23 DIAGNOSIS — E11.9 TYPE 2 DIABETES MELLITUS WITHOUT COMPLICATION, WITHOUT LONG-TERM CURRENT USE OF INSULIN (HCC): ICD-10-CM

## 2025-04-23 LAB — HBA1C MFR BLD: 6.3 %

## 2025-04-23 PROCEDURE — 3074F SYST BP LT 130 MM HG: CPT | Performed by: CLINICAL NURSE SPECIALIST

## 2025-04-23 PROCEDURE — 3079F DIAST BP 80-89 MM HG: CPT | Performed by: CLINICAL NURSE SPECIALIST

## 2025-04-23 PROCEDURE — 83036 HEMOGLOBIN GLYCOSYLATED A1C: CPT | Performed by: CLINICAL NURSE SPECIALIST

## 2025-04-23 PROCEDURE — 99396 PREV VISIT EST AGE 40-64: CPT | Performed by: CLINICAL NURSE SPECIALIST

## 2025-04-23 ASSESSMENT — ENCOUNTER SYMPTOMS
SORE THROAT: 0
VOMITING: 0
EYE PAIN: 0
TROUBLE SWALLOWING: 0
NAUSEA: 0
DIARRHEA: 0
SHORTNESS OF BREATH: 0
CONSTIPATION: 0
BACK PAIN: 0
COLOR CHANGE: 0
COUGH: 0
SINUS PRESSURE: 0
CHEST TIGHTNESS: 0
WHEEZING: 0
EYE DISCHARGE: 0
ABDOMINAL PAIN: 0

## 2025-04-23 NOTE — PROGRESS NOTES
SUBJECTIVE:  Arden Walker Jr. is a 54 y.o. who presents today for Follow-up and Leg Injury (States the spot looks worse but is healing a little. )      LYNSEY Holland presents today for annual,  exam.   Immunizations defers  PSA UTD  Cologuard UTD    Type 2 DM without complication, improved. Doing well on GLP-1  Tolerating orals.  No recent hypos.   No complications.  Eye exam UTD  No neuropathy.     Recently in office with acute SVT left thigh. Due to location and size was placed on eliquis.  He had associated thrombophlebitis. He was placed on keflex.  Later developed increased redness and open wound left thigh. Was seen in office again, concern for spider bite and necrosis. He was then placed on bactrim and dapsone.   He saw general surgery at [facility] and was already seeing improvement by then. No further intervention at that time.  He denies skin redness today. Overall the thigh is better. The open wound has residual necrotic tissue in center but is healing nicely.  He is using horse salve daily and keep wound clean and covered.   Has completed anbx.   Pain much improved and is back to work.     Tolerating eliquis.     Past Medical History:   Diagnosis Date    Acid reflux     Adenomatous goiter     2013 left thyroidectomy  Dr Loving    Arthralgia of multiple sites     Arthritis     osteo    Chronic back pain     COVID-19     Diverticulitis of large intestine without perforation or abscess 03/15/2018    Effusion of patella     Gastric ulcer     Knee pain     Medial meniscus tear     Mixed hyperlipidemia     Obesity     Pain of right hand     Postoperative hypothyroidism     left lobectomy, Dr Loving     Prediabetes     Prolonged emergence from general anesthesia     fights upon emergence; works in [employer], woke feeling he was at work and being threatened.    Septic enteritis 09/05/2022    Simple goiter     Solitary thyroid nodule     Tear of medial meniscus of left knee 05/12/2016    Thyroid disease

## 2025-06-13 DIAGNOSIS — E11.9 TYPE 2 DIABETES MELLITUS WITHOUT COMPLICATION, WITHOUT LONG-TERM CURRENT USE OF INSULIN (HCC): ICD-10-CM

## 2025-06-13 RX ORDER — SEMAGLUTIDE 0.68 MG/ML
INJECTION, SOLUTION SUBCUTANEOUS
Qty: 3 ML | Refills: 3 | Status: SHIPPED | OUTPATIENT
Start: 2025-06-13

## 2025-06-13 NOTE — TELEPHONE ENCOUNTER
Last OV 4/23/2025  Next OV Visit date not found      Requested Prescriptions     Pending Prescriptions Disp Refills    OZEMPIC, 0.25 OR 0.5 MG/DOSE, 2 MG/3ML SOPN [Pharmacy Med Name: OZEMPIC (0.25 OR 0.5 MG)3ML 2 Solution Pen-injector] 3 mL 3     Sig: INJECT 0.5MG UNDER THE SKIN ONCE A WEEK

## 2025-07-07 ENCOUNTER — OFFICE VISIT (OUTPATIENT)
Age: 55
End: 2025-07-07
Payer: COMMERCIAL

## 2025-07-07 VITALS
OXYGEN SATURATION: 98 % | BODY MASS INDEX: 33.61 KG/M2 | SYSTOLIC BLOOD PRESSURE: 114 MMHG | HEART RATE: 96 BPM | TEMPERATURE: 98.1 F | WEIGHT: 276 LBS | HEIGHT: 76 IN | DIASTOLIC BLOOD PRESSURE: 76 MMHG

## 2025-07-07 DIAGNOSIS — M77.52 RETROCALCANEAL BURSITIS (BACK OF HEEL), LEFT: Primary | ICD-10-CM

## 2025-07-07 DIAGNOSIS — M76.62 ACHILLES TENDINITIS OF LEFT LOWER EXTREMITY: ICD-10-CM

## 2025-07-07 PROCEDURE — 3078F DIAST BP <80 MM HG: CPT | Performed by: FAMILY MEDICINE

## 2025-07-07 PROCEDURE — 3074F SYST BP LT 130 MM HG: CPT | Performed by: FAMILY MEDICINE

## 2025-07-07 PROCEDURE — 99213 OFFICE O/P EST LOW 20 MIN: CPT | Performed by: FAMILY MEDICINE

## 2025-07-07 RX ORDER — DICLOFENAC SODIUM 75 MG/1
75 TABLET, DELAYED RELEASE ORAL 2 TIMES DAILY
Qty: 60 TABLET | Refills: 3 | Status: SHIPPED | OUTPATIENT
Start: 2025-07-07

## 2025-07-07 RX ORDER — METHYLPREDNISOLONE 4 MG/1
TABLET ORAL
Qty: 1 KIT | Refills: 0 | Status: SHIPPED | OUTPATIENT
Start: 2025-07-07 | End: 2025-07-13

## 2025-07-07 NOTE — PROGRESS NOTES
diaphoretic.   HENT:      Head: Normocephalic and atraumatic.      Right Ear: External ear normal.      Left Ear: External ear normal.      Nose: Nose normal.      Mouth/Throat:      Mouth: Mucous membranes are moist.      Pharynx: Oropharynx is clear. No oropharyngeal exudate.   Eyes:      General: No scleral icterus.        Right eye: No discharge.         Left eye: No discharge.      Conjunctiva/sclera: Conjunctivae normal.   Neck:      Trachea: No tracheal deviation.   Cardiovascular:      Rate and Rhythm: Normal rate and regular rhythm.      Heart sounds: Normal heart sounds. No murmur heard.     No friction rub. No gallop.   Pulmonary:      Effort: Pulmonary effort is normal. No respiratory distress.      Breath sounds: Normal breath sounds. No wheezing or rales.   Abdominal:      General: Bowel sounds are normal. There is no distension.      Palpations: Abdomen is soft.      Tenderness: There is no abdominal tenderness.   Musculoskeletal:         General: Tenderness present. No deformity (No gross deformities of upper or lower extremities) or signs of injury.      Cervical back: Normal range of motion and neck supple. No muscular tenderness.      Right lower leg: No edema.      Left lower leg: No edema.      Comments: Tenderness associated with Achilles insertion   Lymphadenopathy:      Cervical: No cervical adenopathy.   Skin:     General: Skin is warm and dry.      Findings: No erythema or rash.   Neurological:      Mental Status: He is alert and oriented to person, place, and time.      Cranial Nerves: No cranial nerve deficit.   Psychiatric:         Mood and Affect: Mood normal.         Behavior: Behavior normal.         Thought Content: Thought content normal.               The patient (or guardian, if applicable) and other individuals in attendance with the patient were advised that Artificial Intelligence will be utilized during this visit to record, process the conversation to generate a clinical note

## 2025-07-18 ASSESSMENT — ENCOUNTER SYMPTOMS
RHINORRHEA: 0
CONSTIPATION: 0
DIARRHEA: 0
COUGH: 0
VOMITING: 0
BACK PAIN: 0
SHORTNESS OF BREATH: 0
ABDOMINAL PAIN: 0
NAUSEA: 0

## 2025-08-07 DIAGNOSIS — E03.9 ACQUIRED HYPOTHYROIDISM: ICD-10-CM

## 2025-08-07 RX ORDER — LEVOTHYROXINE SODIUM 100 UG/1
100 TABLET ORAL DAILY
Qty: 30 TABLET | Refills: 5 | Status: SHIPPED | OUTPATIENT
Start: 2025-08-07 | End: 2025-08-08 | Stop reason: SDUPTHER

## 2025-08-08 DIAGNOSIS — E11.9 TYPE 2 DIABETES MELLITUS WITHOUT COMPLICATION, WITHOUT LONG-TERM CURRENT USE OF INSULIN (HCC): ICD-10-CM

## 2025-08-08 DIAGNOSIS — E03.9 ACQUIRED HYPOTHYROIDISM: ICD-10-CM

## 2025-08-08 RX ORDER — METHYLPREDNISOLONE 4 MG/1
TABLET ORAL
Qty: 21 TABLET | Refills: 0 | Status: SHIPPED | OUTPATIENT
Start: 2025-08-08 | End: 2025-08-14

## 2025-08-08 RX ORDER — SEMAGLUTIDE 0.68 MG/ML
INJECTION, SOLUTION SUBCUTANEOUS
Qty: 3 ML | Refills: 3 | Status: SHIPPED | OUTPATIENT
Start: 2025-08-08

## 2025-08-08 RX ORDER — LEVOTHYROXINE SODIUM 100 UG/1
100 TABLET ORAL DAILY
Qty: 30 TABLET | Refills: 5 | Status: SHIPPED | OUTPATIENT
Start: 2025-08-08

## 2025-09-05 ENCOUNTER — OFFICE VISIT (OUTPATIENT)
Age: 55
End: 2025-09-05
Payer: COMMERCIAL

## 2025-09-05 VITALS
SYSTOLIC BLOOD PRESSURE: 120 MMHG | DIASTOLIC BLOOD PRESSURE: 74 MMHG | OXYGEN SATURATION: 97 % | TEMPERATURE: 97.8 F | HEART RATE: 84 BPM | HEIGHT: 76 IN | BODY MASS INDEX: 33.6 KG/M2

## 2025-09-05 DIAGNOSIS — E11.9 TYPE 2 DIABETES MELLITUS WITHOUT COMPLICATION, WITHOUT LONG-TERM CURRENT USE OF INSULIN (HCC): ICD-10-CM

## 2025-09-05 DIAGNOSIS — E03.9 ACQUIRED HYPOTHYROIDISM: ICD-10-CM

## 2025-09-05 DIAGNOSIS — Z12.5 ENCOUNTER FOR SCREENING FOR MALIGNANT NEOPLASM OF PROSTATE: ICD-10-CM

## 2025-09-05 DIAGNOSIS — M25.572 ACUTE LEFT ANKLE PAIN: Primary | ICD-10-CM

## 2025-09-05 DIAGNOSIS — M79.672 PAIN OF LEFT HEEL: ICD-10-CM

## 2025-09-05 PROCEDURE — 3078F DIAST BP <80 MM HG: CPT | Performed by: CLINICAL NURSE SPECIALIST

## 2025-09-05 PROCEDURE — 3044F HG A1C LEVEL LT 7.0%: CPT | Performed by: CLINICAL NURSE SPECIALIST

## 2025-09-05 PROCEDURE — 99213 OFFICE O/P EST LOW 20 MIN: CPT | Performed by: CLINICAL NURSE SPECIALIST

## 2025-09-05 PROCEDURE — 3074F SYST BP LT 130 MM HG: CPT | Performed by: CLINICAL NURSE SPECIALIST

## 2025-09-05 RX ORDER — PREDNISONE 20 MG/1
20 TABLET ORAL 2 TIMES DAILY
Qty: 6 TABLET | Refills: 0 | Status: SHIPPED | OUTPATIENT
Start: 2025-09-05 | End: 2025-09-08

## 2025-09-05 SDOH — ECONOMIC STABILITY: FOOD INSECURITY: WITHIN THE PAST 12 MONTHS, THE FOOD YOU BOUGHT JUST DIDN'T LAST AND YOU DIDN'T HAVE MONEY TO GET MORE.: NEVER TRUE

## 2025-09-05 SDOH — ECONOMIC STABILITY: FOOD INSECURITY: WITHIN THE PAST 12 MONTHS, YOU WORRIED THAT YOUR FOOD WOULD RUN OUT BEFORE YOU GOT MONEY TO BUY MORE.: NEVER TRUE

## 2025-09-05 ASSESSMENT — PATIENT HEALTH QUESTIONNAIRE - PHQ9
1. LITTLE INTEREST OR PLEASURE IN DOING THINGS: NOT AT ALL
SUM OF ALL RESPONSES TO PHQ QUESTIONS 1-9: 0
SUM OF ALL RESPONSES TO PHQ QUESTIONS 1-9: 0
2. FEELING DOWN, DEPRESSED OR HOPELESS: NOT AT ALL
SUM OF ALL RESPONSES TO PHQ QUESTIONS 1-9: 0
SUM OF ALL RESPONSES TO PHQ QUESTIONS 1-9: 0

## 2025-09-05 ASSESSMENT — ENCOUNTER SYMPTOMS: BACK PAIN: 0
